# Patient Record
Sex: MALE | Race: WHITE
[De-identification: names, ages, dates, MRNs, and addresses within clinical notes are randomized per-mention and may not be internally consistent; named-entity substitution may affect disease eponyms.]

---

## 2017-03-16 ENCOUNTER — HOSPITAL ENCOUNTER (INPATIENT)
Dept: HOSPITAL 61 - ER | Age: 57
LOS: 4 days | Discharge: HOME | DRG: 189 | End: 2017-03-20
Attending: FAMILY MEDICINE | Admitting: FAMILY MEDICINE
Payer: MEDICARE

## 2017-03-16 VITALS — DIASTOLIC BLOOD PRESSURE: 66 MMHG | SYSTOLIC BLOOD PRESSURE: 125 MMHG

## 2017-03-16 VITALS — DIASTOLIC BLOOD PRESSURE: 78 MMHG | SYSTOLIC BLOOD PRESSURE: 136 MMHG

## 2017-03-16 VITALS — HEIGHT: 67 IN | BODY MASS INDEX: 24.8 KG/M2 | WEIGHT: 158 LBS

## 2017-03-16 VITALS — DIASTOLIC BLOOD PRESSURE: 69 MMHG | SYSTOLIC BLOOD PRESSURE: 122 MMHG

## 2017-03-16 DIAGNOSIS — Z99.81: ICD-10-CM

## 2017-03-16 DIAGNOSIS — J44.0: ICD-10-CM

## 2017-03-16 DIAGNOSIS — J98.11: ICD-10-CM

## 2017-03-16 DIAGNOSIS — J96.21: Primary | ICD-10-CM

## 2017-03-16 DIAGNOSIS — F17.210: ICD-10-CM

## 2017-03-16 DIAGNOSIS — Z79.82: ICD-10-CM

## 2017-03-16 DIAGNOSIS — G40.909: ICD-10-CM

## 2017-03-16 DIAGNOSIS — J20.9: ICD-10-CM

## 2017-03-16 DIAGNOSIS — Z88.8: ICD-10-CM

## 2017-03-16 DIAGNOSIS — F41.9: ICD-10-CM

## 2017-03-16 DIAGNOSIS — G89.29: ICD-10-CM

## 2017-03-16 DIAGNOSIS — E11.9: ICD-10-CM

## 2017-03-16 DIAGNOSIS — K21.9: ICD-10-CM

## 2017-03-16 DIAGNOSIS — J44.1: ICD-10-CM

## 2017-03-16 DIAGNOSIS — Z87.01: ICD-10-CM

## 2017-03-16 DIAGNOSIS — Z79.899: ICD-10-CM

## 2017-03-16 LAB
ALBUMIN SERPL-MCNC: 3.2 G/DL (ref 3.4–5)
ALBUMIN/GLOB SERPL: 0.9 {RATIO} (ref 1–1.7)
ALP SERPL-CCNC: 113 U/L (ref 46–116)
ALT SERPL-CCNC: 16 U/L (ref 16–63)
ANION GAP SERPL CALC-SCNC: 1 MMOL/L (ref 6–14)
AST SERPL-CCNC: 13 U/L (ref 15–37)
BASOPHILS # BLD AUTO: 0 X10^3/UL (ref 0–0.2)
BASOPHILS NFR BLD: 0 % (ref 0–3)
BILIRUB SERPL-MCNC: 0.3 MG/DL (ref 0.2–1)
BUN SERPL-MCNC: 14 MG/DL (ref 8–26)
BUN/CREAT SERPL: 13 (ref 6–20)
CALCIUM SERPL-MCNC: 8.8 MG/DL (ref 8.5–10.1)
CHLORIDE SERPL-SCNC: 97 MMOL/L (ref 98–107)
CK SERPL-CCNC: 97 U/L (ref 39–308)
CKMB INDEX: 2.5 % (ref 0–4)
CKMB MASS: 2.4 NG/ML (ref 0–3.6)
CO2 SERPL-SCNC: > 45 MMOL/L (ref 21–32)
CREAT SERPL-MCNC: 1.1 MG/DL (ref 0.7–1.3)
EOSINOPHIL NFR BLD: 0 % (ref 0–3)
ERYTHROCYTE [DISTWIDTH] IN BLOOD BY AUTOMATED COUNT: 14.7 % (ref 11.5–14.5)
GFR SERPLBLD BASED ON 1.73 SQ M-ARVRAT: 68.8 ML/MIN
GLOBULIN SER-MCNC: 3.7 G/DL (ref 2.2–3.8)
GLUCOSE SERPL-MCNC: 204 MG/DL (ref 70–99)
HCO3 BLDA-SCNC: 41 MMOL/L (ref 21–28)
HCT VFR BLD CALC: 39.8 % (ref 39–53)
HGB BLD-MCNC: 13.1 G/DL (ref 13–17.5)
INSPIRATION SETTING TIME VENT: (no result)
LYMPHOCYTES # BLD: 1.1 X10^3/UL (ref 1–4.8)
LYMPHOCYTES NFR BLD AUTO: 17 % (ref 24–48)
MCH RBC QN AUTO: 30 PG (ref 25–35)
MCHC RBC AUTO-ENTMCNC: 33 G/DL (ref 31–37)
MCV RBC AUTO: 92 FL (ref 79–100)
MONOCYTES NFR BLD: 6 % (ref 0–9)
NEUTROPHILS NFR BLD AUTO: 76 % (ref 31–73)
OBC FLU: (no result)
PCO2 BLDA: 68 MMHG (ref 35–46)
PH ABG: 7.4 (ref 7.35–7.45)
PLATELET # BLD AUTO: 157 X10^3/UL (ref 140–400)
PO2 BLDA: 56 MMHG (ref 75–108)
POTASSIUM SERPL-SCNC: 4.2 MMOL/L (ref 3.5–5.1)
PROT SERPL-MCNC: 6.9 G/DL (ref 6.4–8.2)
RBC # BLD AUTO: 4.32 X10^6/UL (ref 4.3–5.7)
SAO2 % BLDA: 88 % (ref 92–99)
SODIUM SERPL-SCNC: 143 MMOL/L (ref 136–145)
WBC # BLD AUTO: 6.2 X10^3/UL (ref 4–11)

## 2017-03-16 PROCEDURE — 71010: CPT

## 2017-03-16 PROCEDURE — 84484 ASSAY OF TROPONIN QUANT: CPT

## 2017-03-16 PROCEDURE — 36600 WITHDRAWAL OF ARTERIAL BLOOD: CPT

## 2017-03-16 PROCEDURE — 94660 CPAP INITIATION&MGMT: CPT

## 2017-03-16 PROCEDURE — 83880 ASSAY OF NATRIURETIC PEPTIDE: CPT

## 2017-03-16 PROCEDURE — 82553 CREATINE MB FRACTION: CPT

## 2017-03-16 PROCEDURE — 83605 ASSAY OF LACTIC ACID: CPT

## 2017-03-16 PROCEDURE — 93005 ELECTROCARDIOGRAM TRACING: CPT

## 2017-03-16 PROCEDURE — 36415 COLL VENOUS BLD VENIPUNCTURE: CPT

## 2017-03-16 PROCEDURE — 87040 BLOOD CULTURE FOR BACTERIA: CPT

## 2017-03-16 PROCEDURE — 85027 COMPLETE CBC AUTOMATED: CPT

## 2017-03-16 PROCEDURE — 87804 INFLUENZA ASSAY W/OPTIC: CPT

## 2017-03-16 PROCEDURE — 94640 AIRWAY INHALATION TREATMENT: CPT

## 2017-03-16 PROCEDURE — 87641 MR-STAPH DNA AMP PROBE: CPT

## 2017-03-16 PROCEDURE — 94760 N-INVAS EAR/PLS OXIMETRY 1: CPT

## 2017-03-16 PROCEDURE — 80053 COMPREHEN METABOLIC PANEL: CPT

## 2017-03-16 PROCEDURE — 82947 ASSAY GLUCOSE BLOOD QUANT: CPT

## 2017-03-16 PROCEDURE — 82805 BLOOD GASES W/O2 SATURATION: CPT

## 2017-03-16 RX ADMIN — ALBUTEROL SULFATE PRN MG: 108 AEROSOL, METERED RESPIRATORY (INHALATION) at 20:50

## 2017-03-16 RX ADMIN — AMITRIPTYLINE HYDROCHLORIDE SCH MG: 25 TABLET, FILM COATED ORAL at 21:11

## 2017-03-16 RX ADMIN — BACLOFEN SCH MG: 10 TABLET ORAL at 21:11

## 2017-03-16 RX ADMIN — LEVETIRACETAM SCH MG: 500 TABLET, FILM COATED ORAL at 21:11

## 2017-03-16 RX ADMIN — CLONAZEPAM SCH MG: 0.5 TABLET ORAL at 21:11

## 2017-03-16 NOTE — PHYS DOC
Past Medical History


Past Medical History:  Anxiety, COPD, Diabetes-Type II, GERD, Pneumonia, Seizure

, Other


Additional Past Medical Histor:  Bronchitis, RSD, back pain


Past Surgical History:  Other


Additional Past Surgical Histo:  PAIN PUMP, CYSTS REMOVED


Alcohol Use:  Rarely


Drug Use:  None





Adult General


Chief Complaint


Chief Complaint:  SHORTNESS OF BREATH





HPI


HPI


Patient is a 56  year old male who presents with complaint of shortness breath 

and cough. Patient has had worsening symptoms over the past week. Patient has 

history of COPD area patient also has history of chronic pain and currently has 

a morphine pain pump in place. Patient has not had any recent adjustments in 

2016 to his pump. Patient states that he has been having productive 

cough of yellowish clear sputum. A shows a Dr. Ahumada for primary care. 

Patient denies any chest pain or pain aside from his chronic back pain. Patient 

has taken albuterol at home with no relief in symptoms.





Review of Systems


Review of Systems





Constitutional: Denies fever or chills []


Eyes: Denies change in visual acuity, redness, or eye pain []


HENT: Denies nasal congestion or sore throat []


Respiratory: Productive cough, shortness of breath []


Cardiovascular: Denies chest pain or edema []


GI: Denies abdominal pain, nausea, vomiting, bloody stools or diarrhea []


: Denies dysuria or hematuria []


Musculoskeletal: Denies back pain or joint pain []


Integument: Denies rash or skin lesions []


Neurologic: Confusion, denies headache, focal weakness or sensory changes []





Current Medications


Current Medications





 Current Medications








 Medications


  (Trade)  Dose


 Ordered  Sig/Garrick  Start Time


 Stop Time Status Last Admin


Dose Admin


 


 Albuterol/


 Ipratropium


  (Duoneb)  3 ml  1X  ONCE  3/16/17 12:45


 3/16/17 12:46 DC 3/16/17 13:09


3 ML


 


 Sodium Chloride


  (Iv Sodium


 Chloride 0.9%


 1000ml Bag)  1,000 ml @ 


 100 mls/hr  Q10H  3/16/17 12:38


 3/16/17 22:37  3/16/17 13:08


100 MLS/HR











Allergies


Allergies





 Allergies








Coded Allergies Type Severity Reaction Last Updated Verified


 


  midazolam Allergy Severe Anaphylaxis 3/16/17 Yes


 


  paroxetine Allergy Severe seizures 3/16/17 Yes


 


  I S O L A T I O N *CONTACT* Allergy Unknown  3/16/17 Yes











Physical Exam


Physical Exam





Constitutional: Alert, afebrile, confused, no acute distress. []


HENT: Normocephalic, atraumatic, bilateral external ears normal, oropharynx 

moist, no oral exudates, nose normal. []


Eyes: PERRLA, EOMI, conjunctiva normal, no discharge. [] 


Neck: Normal range of motion, no tenderness, supple, no stridor. [] 


Cardiovascular:Heart rate regular rhythm, no murmur []


Lungs & Thorax: Moderately restricted air movement bilaterally, expiratory 

wheezes bilaterally, no rales []


Abdomen: Bowel sounds normal, soft, no tenderness, no masses, no pulsatile 

masses. [] 


Skin: Warm, dry, no erythema, no rash. [] 


Back: No tenderness, no CVA tenderness. [] 


Extremities: No tenderness, no cyanosis, no clubbing, ROM intact, no edema. [] 


Neurologic: Alert, oriented to person place and time, disoriented to events, 

normal motor function, normal sensory function, no focal deficits noted. []





Current Patient Data


Vital Signs





 Vital Signs








  Date Time  Temp Pulse Resp B/P Pulse Ox O2 Delivery O2 Flow Rate FiO2


 


3/16/17 13:09     90 Nasal Cannula 5.0 


 


3/16/17 11:30 98.5 95 22 131/74    





 98.5       








Lab Values





 Laboratory Tests








Test


  3/16/17


12:35 3/16/17


12:38 3/16/17


13:05


 


White Blood Count


  6.2x10^3/uL


(4.0-11.0) 


  


 


 


Red Blood Count


  4.32x10^6/uL


(4.30-5.70) 


  


 


 


Hemoglobin


  13.1g/dL


(13.0-17.5) 


  


 


 


Hematocrit


  39.8%


(39.0-53.0) 


  


 


 


Mean Corpuscular Volume 92fL ()    


 


Mean Corpuscular Hemoglobin 30pg (25-35)    


 


Mean Corpuscular Hemoglobin


Concent 33g/dL (31-37)


  


  


 


 


Red Cell Distribution Width


  14.7%


(11.5-14.5)  H 


  


 


 


Platelet Count


  157x10^3/uL


(140-400) 


  


 


 


Neutrophils (%) (Auto) 76% (31-73)  H  


 


Lymphocytes (%) (Auto) 17% (24-48)  L  


 


Monocytes (%) (Auto) 6% (0-9)    


 


Eosinophils (%) (Auto) 0% (0-3)    


 


Basophils (%) (Auto) 0% (0-3)    


 


Neutrophils # (Auto)


  4.8x10^3uL


(1.8-7.7) 


  


 


 


Lymphocytes # (Auto)


  1.1x10^3/uL


(1.0-4.8) 


  


 


 


Monocytes # (Auto)


  0.4x10^3/uL


(0.0-1.1) 


  


 


 


Eosinophils # (Auto)


  0.0x10^3/uL


(0.0-0.7) 


  


 


 


Basophils # (Auto)


  0.0x10^3/uL


(0.0-0.2) 


  


 


 


Sodium Level


  143mmol/L


(136-145) 


  


 


 


Potassium Level


  4.2mmol/L


(3.5-5.1) 


  


 


 


Chloride Level


  97mmol/L


()  L 


  


 


 


Carbon Dioxide Level


  > 45mmol/L


(21-32)  H 


  


 


 


Anion Gap 1 (6-14)  L  


 


Blood Urea Nitrogen


  14mg/dL (8-26)


  


  


 


 


Creatinine


  1.1mg/dL


(0.7-1.3) 


  


 


 


Estimated GFR


(Cockcroft-Gault) 68.8  


  


  


 


 


BUN/Creatinine Ratio 13 (6-20)    


 


Glucose Level


  204mg/dL


(70-99)  H 


  


 


 


Lactic Acid Level


  1.4mmol/L


(0.4-2.0) 


  


 


 


Calcium Level


  8.8mg/dL


(8.5-10.1) 


  


 


 


Total Bilirubin


  0.3mg/dL


(0.2-1.0) 


  


 


 


Aspartate Amino Transferase


(AST) 13U/L (15-37)


L 


  


 


 


Alanine Aminotransferase (ALT) 16U/L (16-63)    


 


Alkaline Phosphatase


  113U/L


() 


  


 


 


Creatine Kinase


  97U/L ()


  


  


 


 


Creatine Kinase MB (Mass)


  2.4ng/mL


(0.0-3.6) 


  


 


 


Creatine Kinase MB Relative


Index 2.5% (0-4)  


  


  


 


 


Troponin I Quantitative


  0.018ng/mL


(0.000-0.055) 


  


 


 


NT-Pro-B-Type Natriuretic


Peptide 463pg/mL


(0-124)  H 


  


 


 


Total Protein


  6.9g/dL


(6.4-8.2) 


  


 


 


Albumin


  3.2g/dL


(3.4-5.0)  L 


  


 


 


Albumin/Globulin Ratio


  0.9 (1.0-1.7)


L 


  


 


 


O2 Saturation  88% (92-99)  L 


 


Arterial Blood pH


  


  7.40


(7.35-7.45) 


 


 


Arterial Blood pCO2 at


Patient Temp 


  68mmHg (35-46)


*H 


 


 


Arterial Blood pO2 at Patient


Temp 


  56mmHg


()  L 


 


 


Arterial Blood HCO3


  


  41mmol/L


(21-28)  H 


 


 


Arterial Blood Base Excess


  


  13mmol/L


(-3-3)  H 


 


 


FiO2  40%   


 


Influenza Type A Antigen


  


  


  Negative


(NEGATIVE)


 


Influenza Type B Antigen


  


  


  Negative


(NEGATIVE)





 Laboratory Tests


3/16/17 12:35








 Laboratory Tests


3/16/17 12:35














EKG


EKG


Interpreted by me: Heart rate 91, sinus tachycardia, normal intervals, normal 

axis, no acute ST/T-wave abnormalities present []





Radiology/Procedures


Radiology/Procedures





 [facility]


 [address] [place] [address]


 [phone]


 


 IMAGING REPORT





 Signed





PATIENT: ZACHARY LENZ ACCOUNT: [id number] MRN#: [id number]


: 1960 LOCATION: ER AGE: 56


SEX: M EXAM DT: 17 ACCESSION#: [id number]


STATUS: REG ER ORD. PHYSICIAN: ELOISA LUNDBERG MD 


REASON: shortness of breath, cough


PROCEDURE: PORTABLE CHEST 1V











Portable chest, 3/16/2017:





History: Cough, shortness of breath





Comparison is made to a study from 1/15/2017. The heart size is normal. There


are emphysematous changes in the lungs with scattered parenchymal scars. There


is mild streaky bibasilar atelectasis and/or scarring. There is no evidence of


pleural fluid.





IMPRESSION:


1. Emphysema.


2. Mild bibasilar streaky atelectasis and/or scarring. A component of


pneumonia cannot be excluded.














DICTATED and SIGNED BY:     MORITZ,RICK S MD


DATE:     17 6739





CC: ELOISA LUNDBERG MD; MONIKA AHUMADA MD ~


[]





Course & Med Decision Making


Course & Med Decision Making


Pertinent Labs and Imaging studies reviewed. (See chart for details)





Patient was found to be significantly hypoxic. Despite increase in supplemental 

oxygen per nasal cannula, the patient's O2 sats were 88%. The patient was 

switched to a Venturi mask at 40% with improvement of oxygen saturation at 94%. 

The patient's x-ray findings are most consistent with atelectasis as the 

patient does not have a fever, high white count, or other evidence of active 

pneumonia at this time. The patient will be admitted for further treatment. I 

spoke with Dr. Ahumada who accepted care patient in hospital.





Dragon Disclaimer


Dragon Disclaimer


This electronic medical record was generated, in whole or in part, using a 

voice recognition dictation system.





Departure


Departure


Impression:  


 Primary Impression:  


 Acute on chronic respiratory failure


 Additional Impression:  


 Altered mental status


Disposition:   ADMITTED AS INPATIENT


Admitting Physician:  Monika Ahumada


Condition:  GUARDED


Referrals:  


MONIKA AHUMADA MD (PCP)





Problem Qualifiers








 Primary Impression:  


 Acute on chronic respiratory failure


 Respiratory failure complication:  hypoxia  Qualified Code:  J96.21 - Acute 

and chronic respiratory failure with hypoxia


 Additional Impression:  


 Altered mental status


 Altered mental status type:  disorientation  Qualified Code:  R41.0 - 

Disorientation, unspecified





ELOISA LUNDBERG MD Mar 16, 2017 14:29

## 2017-03-16 NOTE — EKG
[facility]

               [address] [place] [address]

Test Date:    2017               Test Time:    13:31:51

Pat Name:     ZACHARY LENZ          Department:   

Patient ID:   [id number]           Room:          

Gender:       Male                     Technician:   

:          1960               Requested By: ELOISA LUNDBERG

Order Number: [id number]            Reading MD:   Mandie Logan

                                 Measurements

Intervals                              Axis          

Rate:         91                       P:            -8

AK:           144                      QRS:          60

QRSD:         72                       T:            37

QT:           354                                    

QTc:          443                                    

                           Interpretive Statements

SINUS RHYTHM

NORMAL ECG

RI6.01          Unconfirmed report

Compared to ECG 01/15/2017 12:59:19

Sinus tachycardia no longer present



Electronically Signed On 3- 20:03:51 CDT by Mandie Logan

## 2017-03-16 NOTE — ACF
Admission Forms Criteria


                                 RESPIRATORY FAILURE [facility]





Clinical Indications for Admission to Inpatient Care





                                                                    (Place 'X' 

for any and all applicable criteria):


                                                                          


Hospital admission is needed for appropriate care of the patient because of 

acute respiratory failure or insufficiency 


as indicated by ANY ONE of the following(1)(2)(3)(4)(5)(6)(7)(8):





[ ]I.    Mechanical ventilation needed (acute invasive or noninvasive)


[X]II.   Severe ventilation deficit as indicated by ANY ONE of the following (9)


        [ ]a)  Respiratory acidosis (pH less than 7.32 and partial pressure of 

carbon dioxide greater 


                than 40 mm Hg (5.3 kPa))


        [X]b)  Partial pressure of carbon dioxide greater than 44 mm Hg (5.9 kPa

) (new)


        [ ]c)  Airflow measurements less than 25% of predicted (eg, peak 

expiratory flow rate 


                less than 100 L/minute)


        [ ]d)  Forced vital capacity less than 15 mL/kg of ideal body weight, 

or 50% decrease in 


                vital capacity from baseline


[ ]III.  Noncardiac pulmonary edema not resolving with rapid emergency 

treatment (8)


[ ]IV. Severe respiratory distress as indicated by ANY ONE of the following:


        [ ]a)  Severe tachypnea (respiratory rate greater than 30, greater than 

45 for 6-month-old, 


                greater than 60 for )


        [ ]b)  Severe hypoxemia (partial pressure of oxygen less than 50 mm Hg (

6.7 kPa) on greater


                than 50% oxygen or partial pressure of oxygen to FIO2 ratio 

less than 200)


        [ ]c)  Mental status deterioration from respiratory disease


[ ]V.  Airway obstruction or inadequate protection [A](10)(11) 











The original [employer] content created by [employer] has been revised. 


The portions of the content which have been revised are identified through the 

use of italic text or in bold, and [employer] 


has neither  reviewed nor approved the modified material. All other unmodified 

content is copyright  [employer].





Please see references footnoted in the original [employer] edition 

2016


Admission Criteria Met?:  Yes








CRIS SALAS Mar 16, 2017 14:48

## 2017-03-17 VITALS — DIASTOLIC BLOOD PRESSURE: 55 MMHG | SYSTOLIC BLOOD PRESSURE: 106 MMHG

## 2017-03-17 VITALS — SYSTOLIC BLOOD PRESSURE: 125 MMHG | DIASTOLIC BLOOD PRESSURE: 65 MMHG

## 2017-03-17 VITALS — SYSTOLIC BLOOD PRESSURE: 126 MMHG | DIASTOLIC BLOOD PRESSURE: 69 MMHG

## 2017-03-17 VITALS — SYSTOLIC BLOOD PRESSURE: 124 MMHG | DIASTOLIC BLOOD PRESSURE: 79 MMHG

## 2017-03-17 VITALS — SYSTOLIC BLOOD PRESSURE: 119 MMHG | DIASTOLIC BLOOD PRESSURE: 79 MMHG

## 2017-03-17 VITALS — SYSTOLIC BLOOD PRESSURE: 124 MMHG | DIASTOLIC BLOOD PRESSURE: 72 MMHG

## 2017-03-17 LAB
HCO3 BLDA-SCNC: 41 MMOL/L (ref 21–28)
INSPIRATION SETTING TIME VENT: 50
PCO2 BLDA: 73 MMHG (ref 35–46)
PH ABG: 7.37 (ref 7.35–7.45)
PO2 BLDA: 107 MMHG (ref 75–108)
SAO2 % BLDA: 97 % (ref 92–99)

## 2017-03-17 RX ADMIN — BUDESONIDE SCH MG: 0.5 SUSPENSION RESPIRATORY (INHALATION) at 19:37

## 2017-03-17 RX ADMIN — BACLOFEN SCH MG: 10 TABLET ORAL at 09:06

## 2017-03-17 RX ADMIN — METHYLPREDNISOLONE SODIUM SUCCINATE SCH MG: 40 INJECTION, POWDER, FOR SOLUTION INTRAMUSCULAR; INTRAVENOUS at 21:00

## 2017-03-17 RX ADMIN — BACLOFEN SCH MG: 10 TABLET ORAL at 20:59

## 2017-03-17 RX ADMIN — IPRATROPIUM BROMIDE AND ALBUTEROL SULFATE SCH ML: .5; 3 SOLUTION RESPIRATORY (INHALATION) at 15:33

## 2017-03-17 RX ADMIN — TAMSULOSIN HYDROCHLORIDE SCH MG: 0.4 CAPSULE ORAL at 09:07

## 2017-03-17 RX ADMIN — Medication SCH MLS/HR: at 11:46

## 2017-03-17 RX ADMIN — ENOXAPARIN SODIUM SCH MG: 40 INJECTION SUBCUTANEOUS at 11:45

## 2017-03-17 RX ADMIN — IPRATROPIUM BROMIDE AND ALBUTEROL SULFATE SCH ML: .5; 3 SOLUTION RESPIRATORY (INHALATION) at 07:15

## 2017-03-17 RX ADMIN — FUROSEMIDE SCH MG: 20 TABLET ORAL at 16:21

## 2017-03-17 RX ADMIN — METHYLPREDNISOLONE SODIUM SUCCINATE SCH MG: 40 INJECTION, POWDER, FOR SOLUTION INTRAMUSCULAR; INTRAVENOUS at 09:07

## 2017-03-17 RX ADMIN — IPRATROPIUM BROMIDE AND ALBUTEROL SULFATE SCH ML: .5; 3 SOLUTION RESPIRATORY (INHALATION) at 11:08

## 2017-03-17 RX ADMIN — AMITRIPTYLINE HYDROCHLORIDE SCH MG: 25 TABLET, FILM COATED ORAL at 20:59

## 2017-03-17 RX ADMIN — ASPIRIN 81 MG CHEWABLE TABLET SCH MG: 81 TABLET CHEWABLE at 09:06

## 2017-03-17 RX ADMIN — FUROSEMIDE SCH MG: 20 TABLET ORAL at 09:06

## 2017-03-17 RX ADMIN — BUDESONIDE SCH MG: 0.5 SUSPENSION RESPIRATORY (INHALATION) at 11:00

## 2017-03-17 RX ADMIN — CLONAZEPAM SCH MG: 0.5 TABLET ORAL at 09:06

## 2017-03-17 RX ADMIN — CLONAZEPAM SCH MG: 0.5 TABLET ORAL at 20:59

## 2017-03-17 RX ADMIN — AMLODIPINE BESYLATE SCH MG: 2.5 TABLET ORAL at 09:06

## 2017-03-17 RX ADMIN — IPRATROPIUM BROMIDE AND ALBUTEROL SULFATE SCH ML: .5; 3 SOLUTION RESPIRATORY (INHALATION) at 19:37

## 2017-03-17 RX ADMIN — LEVETIRACETAM SCH MG: 500 TABLET, FILM COATED ORAL at 09:07

## 2017-03-17 RX ADMIN — PANTOPRAZOLE SODIUM SCH MG: 40 TABLET, DELAYED RELEASE ORAL at 09:07

## 2017-03-17 RX ADMIN — LEVETIRACETAM SCH MG: 500 TABLET, FILM COATED ORAL at 20:59

## 2017-03-17 NOTE — HP
ADMIT DATE:  03/16/2017



CHIEF COMPLAINT:  Shortness of breath.



HISTORY OF PRESENT ILLNESS:  A 56-year-old known end-stage, COPD patient on

chronic oxygen, also takes medication for chronic pain and ____ implant under

direction of Camilo Alejandro M.D.  He came in with increasing shortness of breath

and found to be in some degree of respiratory failure with high pCO2 ____ be

normal pH.  He was placed on BiPAP overnight and is done better.  He is eating

and drinking this morning and feeling better.  He describes no fever, chills,

significant sputum or hemoptysis.  Chest x-ray was clear.



PAST MEDICAL HISTORY:  Well documented in the old records.



MEDICATIONS:  Multiple meds listed per the chart.



Last admission was about 6-8 weeks ago.



SOCIAL HISTORY:  Still smokes fairly heavily at home.  , disabled,

nondrinker to my knowledge.



FAMILY HISTORY:  Unremarkable.



REVIEW OF SYSTEMS:  No other complaints.



OBJECTIVE:

ENT:  All within normal limits.

NECK:  No masses, nodes or bruits.

LUNGS:  Decreased breath sounds, coarse expiratory wheezes.  No tachypnea noted.

CARDIOVASCULAR:  Regular rate.  No tachycardia or murmur.

ABDOMEN:  Soft, benign, nontender.

EXTREMITIES:  Good pedal and radial pulses.  No joint or skin lesions, 2+

clubbing.

NEUROLOGIC:  Physiologic, moves all extremities, alert, oriented x 4.



ASSESSMENT:  Exacerbation of chronic obstructive pulmonary disease, chronic

tobacco abuse and chronic nonmalignant pain.



PLAN:  He is a DNR, continue IV steroids, sputum culture, supportive care.

 



______________________________

MONIKA AHUMADA MD



DR:  SANJUANITA/duane  JOB#:  [id number] / [id number]

DD:  03/17/2017 08:33  DT:  03/17/2017 09:08

## 2017-03-17 NOTE — RAD
Chest AP only 

Indication: Altered mental status and shortness of air. 

Time of exam 1:22 45. 

Comparison is made with prior chest from 03/16/2017. 

The heart is enlarged but stable. Lungs appear hyperinflated consistent 

with COPD. No parenchymal consolidation is identified. No effusion or 

pneumothorax is seen. 

Impression: COPD. No acute feature is identified. 

 

Electronically signed by: Estiven Landaverde MD (Mar 17, 2017 01:35:17)

## 2017-03-17 NOTE — CONS
DATE OF CONSULTATION:  03/17/2017



I was asked to see this 56-year-old gentleman for acute-on-chronic respiratory

failure, acute exacerbation of COPD.



HISTORY OF PRESENT ILLNESS:  He does have history of 80-pack-year smoking,

continues to smoke about 1 pack per day.  He is on oxygen 5 liters per minute

via nasal cannula continuously.  He is apparently on BiPAP at night.  He has had

increased shortness of breath, cough, yellow sputum production, wheezing for the

past few days.  He has nasal congestion, but denies chest pain or

gastroesophageal reflux symptoms.



PAST MEDICAL HISTORY:  COPD, chronic respiratory failure, diabetes mellitus,

gastroesophageal reflux disease, seizure.



ALLERGIES:  MIDAZOLAM, PAROXETINE.



MEDICATIONS:  Currently, he is on albuterol and Atrovent nebulizer, Solu-Medrol

40 mg IV every 12.



SOCIAL HISTORY:  History of 80-pack-year smoking, continues to smoke about 1

pack per day.



FAMILY HISTORY:  There is no history of lung disease.



REVIEW OF SYSTEMS:  As mentioned as above, other systems otherwise negative.



PHYSICAL EXAMINATION:

GENERAL:  This is a gentleman who looks older than his age.

VITAL SIGNS:  His O2 saturation is 93%, heart rate 86, blood pressure 127/79,

temperature 97.6, respiratory rate 20.

HEENT:  Normocephalic, atraumatic.  Pupils equal, round, reactive to light. 

Throat is clear.  Nose, there is an inflamed mucosa.

NECK:  There is no JVD, lymphadenopathy or thyromegaly.

CARDIOVASCULAR:  Regular rate and rhythm.  PMI is nondisplaced.

CHEST:  Inspection is normal.

LUNGS:  There is end expiratory wheezing, dullness at the bases.

ABDOMEN:  Soft.  Bowel sounds are good.  There is no mass.

EXTREMITIES:  There is no cyanosis.

LYMPHATICS:  There is no lymphadenopathy.

SKIN:  Chronic changes.

NEUROLOGIC:  Alert and oriented x 3.

LYMPHATICS:  There is no lymphadenopathy.



LABORATORY DATA:  I reviewed the following lab data:  Chest x-ray shows COPD

changes.  WBC 6.2, hemoglobin 13.1, platelets 157.  Influenza A and B negative. 

ABG:  pH 7.37, pCO2 of 73, pO2 107 on 50% Ventimask.  Sodium 142, potassium 4.2,

chloride 97, CO2 45.  Glucose 204, BUN 14, creatinine 1.9.  Troponin 0.018.



IMPRESSION:

1.  Acute on chronic respiratory failure, multifactorial in etiology.

2.  Acute exacerbation of chronic obstructive pulmonary disease.

3.  Acute bronchitis.

4.  Tobacco habituation.

5.  History of seizure disorder.

6.  Gastroesophageal reflux disease.



PLAN AND RECOMMENDATION:

1.  Titrate FiO2 to keep O2 saturation 92%.

2.  BiPAP p.r.n. during day and continuously at night.

3.  Bronchodilator.

4.  Add inhaled corticosteroid.

5.  Solu-Medrol 40 mg IV every 12.

6.  Add Rocephin.

7.  Protonix for stress ulcer prophylaxis.

8.  Lovenox for DVT prophylaxis.

9.  Monitor respiratory status very closely.

10.  I had a long discussion with him regarding smoking cessation.  I have

advised him to stop smoking forever.



Thank you very much for allowing me to participate in care of this very nice

gentleman.  I have discussed the findings and recommendations with the patient

and RN.  I have answered all of the patient's questions.  He understood and

agreed to proceed with the plan.

 



______________________________

TAVIA KINGSTON M.D.



:  RONNI/duane  JOB#:  [id number] / [id number]

DD:  03/17/2017 10:25  DT:  03/17/2017 10:43

FERMIN

## 2017-03-17 NOTE — PDOC
Provider Note


Provider Note


[id number]





acute on chronic resp fail


ae of copd


acute bronchitis





see orders








TAVIA KINGSTON MD Mar 17, 2017 10:26

## 2017-03-18 VITALS — DIASTOLIC BLOOD PRESSURE: 73 MMHG | SYSTOLIC BLOOD PRESSURE: 133 MMHG

## 2017-03-18 VITALS — SYSTOLIC BLOOD PRESSURE: 119 MMHG | DIASTOLIC BLOOD PRESSURE: 65 MMHG

## 2017-03-18 VITALS — DIASTOLIC BLOOD PRESSURE: 69 MMHG | SYSTOLIC BLOOD PRESSURE: 128 MMHG

## 2017-03-18 VITALS — DIASTOLIC BLOOD PRESSURE: 70 MMHG | SYSTOLIC BLOOD PRESSURE: 126 MMHG

## 2017-03-18 VITALS — SYSTOLIC BLOOD PRESSURE: 119 MMHG | DIASTOLIC BLOOD PRESSURE: 73 MMHG

## 2017-03-18 VITALS — DIASTOLIC BLOOD PRESSURE: 78 MMHG | SYSTOLIC BLOOD PRESSURE: 122 MMHG

## 2017-03-18 RX ADMIN — FUROSEMIDE SCH MG: 20 TABLET ORAL at 08:39

## 2017-03-18 RX ADMIN — IPRATROPIUM BROMIDE AND ALBUTEROL SULFATE SCH ML: .5; 3 SOLUTION RESPIRATORY (INHALATION) at 07:24

## 2017-03-18 RX ADMIN — ASPIRIN 81 MG CHEWABLE TABLET SCH MG: 81 TABLET CHEWABLE at 08:40

## 2017-03-18 RX ADMIN — FUROSEMIDE SCH MG: 20 TABLET ORAL at 15:03

## 2017-03-18 RX ADMIN — AMITRIPTYLINE HYDROCHLORIDE SCH MG: 25 TABLET, FILM COATED ORAL at 21:01

## 2017-03-18 RX ADMIN — CLONAZEPAM SCH MG: 0.5 TABLET ORAL at 08:39

## 2017-03-18 RX ADMIN — TAMSULOSIN HYDROCHLORIDE SCH MG: 0.4 CAPSULE ORAL at 08:40

## 2017-03-18 RX ADMIN — BUDESONIDE SCH MG: 0.5 SUSPENSION RESPIRATORY (INHALATION) at 20:48

## 2017-03-18 RX ADMIN — PANTOPRAZOLE SODIUM SCH MG: 40 TABLET, DELAYED RELEASE ORAL at 08:40

## 2017-03-18 RX ADMIN — IPRATROPIUM BROMIDE AND ALBUTEROL SULFATE SCH ML: .5; 3 SOLUTION RESPIRATORY (INHALATION) at 20:48

## 2017-03-18 RX ADMIN — MONTELUKAST SODIUM SCH MG: 10 TABLET, FILM COATED ORAL at 21:02

## 2017-03-18 RX ADMIN — LEVETIRACETAM SCH MG: 500 TABLET, FILM COATED ORAL at 08:39

## 2017-03-18 RX ADMIN — BACLOFEN SCH MG: 10 TABLET ORAL at 08:39

## 2017-03-18 RX ADMIN — IPRATROPIUM BROMIDE AND ALBUTEROL SULFATE SCH ML: .5; 3 SOLUTION RESPIRATORY (INHALATION) at 11:06

## 2017-03-18 RX ADMIN — BUDESONIDE SCH MG: 0.5 SUSPENSION RESPIRATORY (INHALATION) at 07:24

## 2017-03-18 RX ADMIN — LEVETIRACETAM SCH MG: 500 TABLET, FILM COATED ORAL at 21:02

## 2017-03-18 RX ADMIN — BACLOFEN SCH MG: 10 TABLET ORAL at 21:02

## 2017-03-18 RX ADMIN — CLONAZEPAM SCH MG: 0.5 TABLET ORAL at 21:01

## 2017-03-18 RX ADMIN — METHYLPREDNISOLONE SODIUM SUCCINATE SCH MG: 40 INJECTION, POWDER, FOR SOLUTION INTRAMUSCULAR; INTRAVENOUS at 08:40

## 2017-03-18 RX ADMIN — Medication SCH MLS/HR: at 12:42

## 2017-03-18 RX ADMIN — ENOXAPARIN SODIUM SCH MG: 40 INJECTION SUBCUTANEOUS at 12:43

## 2017-03-18 RX ADMIN — IPRATROPIUM BROMIDE AND ALBUTEROL SULFATE SCH ML: .5; 3 SOLUTION RESPIRATORY (INHALATION) at 15:35

## 2017-03-18 RX ADMIN — AMLODIPINE BESYLATE SCH MG: 2.5 TABLET ORAL at 08:43

## 2017-03-18 NOTE — PDOC
GENERAL


General:


vss and afebrile. O2 decreased to 5L/NC. CO2 retention on ABG but ph 

compensated.  CO2 on bmp > 45. cbc ok. cxr without acute change. MRSA + and 

influenza negative.  blood culture negative to date. complains of tremor and 

had sx vs syncopal spell at home prior to admit. pulmonary help appreciated. 

continue same.


Problems:  





VITAL SIGNS


Vital Signs:





 Vital Signs








  Date Time  Temp Pulse Resp B/P Pulse Ox O2 Delivery O2 Flow Rate FiO2


 


3/18/17 15:37     96 Nasal Cannula 5.0 


 


3/18/17 11:10 98.2 91 18 119/73    





 98.2       











I & O


I & O











 Intake and Output 


 


 3/18/17





 07:00


 


Intake Total 1260 ml


 


Output Total 2150 ml


 


Balance -890 ml


 


 


 


Intake Oral 1260 ml


 


Output Urine Total 2150 ml











ALLERGIES


Allergies:





 Allergies








Coded Allergies Type Severity Reaction Last Updated Verified


 


  midazolam Allergy Severe Anaphylaxis 3/16/17 Yes


 


  paroxetine Allergy Severe seizures 3/16/17 Yes


 


  I S O L A T I O N *CONTACT* Allergy Unknown  3/16/17 Yes











MEDS


Medications:





 Current Medications








 Medications


  (Trade)  Dose


 Ordered  Sig/Garrick  Start Time


 Stop Time Status Last Admin


Dose Admin


 


 Albuterol Sulfate


  (Ventolin Neb


 Soln)  2.5 mg  PRN Q4HRS  PRN  3/16/17 18:00


    3/16/17 20:50


2.5 MG


 


 Albuterol/


 Ipratropium


  (Duoneb)  3 ml  RTQID  3/17/17 08:00


    3/18/17 15:35


3 ML


 


 Amitriptyline HCl


  (Elavil)  75 mg  QHS  3/16/17 21:00


    3/17/17 20:59


75 MG


 


 Amlodipine


 Besylate


  (Norvasc)  2.5 mg  DAILY  3/17/17 09:00


    3/18/17 08:43


2.5 MG


 


 Aspirin


  (Children'S


 Aspirin)  81 mg  [id number]  3/17/17 08:00


    3/18/17 08:40


81 MG


 


 Baclofen


  (Lioresal)  20 mg  BID  3/16/17 21:00


    3/18/17 08:39


20 MG


 


 Budesonide


  (Pulmicort)  0.5 mg  RTBID  3/17/17 11:00


    3/18/17 07:24


0.5 MG


 


 Ceftriaxone


 Sodium/Sodium


 Chloride


  (Rocephin/Iv


 Sodium Chloride


 0.9% 50ml)  50 ml @ 


 100 mls/hr  Q24H  3/17/17 11:00


    3/18/17 12:42


100 MLS/HR


 


 Clonazepam


  (Klonopin)  0.5 mg  BID  3/16/17 21:00


    3/18/17 08:39


0.5 MG


 


 Enoxaparin Sodium


  (Lovenox 40mg


 Syringe)  40 mg  Q24H  3/17/17 11:00


    3/18/17 12:43


40 MG


 


 Furosemide


  (Lasix)  20 mg  BID92  3/17/17 09:00


    3/18/17 15:03


20 MG


 


 Levetiracetam


  (Keppra)  500 mg  BID  3/16/17 21:00


    3/18/17 08:39


500 MG


 


 Methylprednisolone


 Sodium Succinate


 40 mg  40 mg  Q12HR  3/17/17 09:00


 3/18/17 09:58 DC 3/18/17 08:40


40 MG


 


 Montelukast Sodium


  (Singulair)  10 mg  QHS  3/18/17 21:00


     


 


 


 Pantoprazole


 Sodium


  (Protonix)  40 mg  DAILYAC  3/17/17 11:30


 3/17/17 15:53 DC 3/17/17 11:45


40 MG


 


 Polyethylene


 Glycol


  (miraLAX PACKET)  17 gm  PRN DAILY  PRN  3/16/17 18:00


     


 


 


 Prednisone


  (Prednisone)  40 mg  DAILY  3/19/17 09:00


     


 


 


 Sodium Chloride


  (Iv Sodium


 Chloride 0.9%


 1000ml Bag)  1,000 ml @ 


 100 mls/hr  Q10H  3/16/17 12:38


 3/16/17 22:37 DC 3/16/17 13:08


100 MLS/HR


 


 Tamsulosin HCl


  (Flomax)  0.4 mg  DAILY  3/17/17 09:00


    3/18/17 08:40


0.4 MG














DAWSON SY MD Mar 18, 2017 15:53

## 2017-03-18 NOTE — PDOC
PULMONARY PROGRESS NOTES


Subjective


on 02, used bipap last night, sob, cough better, no pain, has runny nose


Vitals





 Vital Signs








  Date Time  Temp Pulse Resp B/P Pulse Ox O2 Delivery O2 Flow Rate FiO2


 


3/18/17 08:43  80  126/70    


 


3/18/17 07:25     98 Nasal Cannula 5.0 


 


3/18/17 07:10 97.9  18     





 97.9       








Comments


ros as mentioned as above other sys otherwise neg


ROS:  No Nausea, No Chest Pain, No Abdominal Pain


General:  Alert, Lethargic


HEENT:  Other (nc at perrl throat clear)


Lungs:  Wheezing, Other


Cardiovascular:  S1


Abdomen:  Soft, Non-tender


Neuro Exam:  Alert


Extremities:  Other


Skin:  Warm


Labs





Laboratory Tests








Test


  3/16/17


12:35 3/16/17


12:38 3/16/17


13:05 3/16/17


18:28


 


White Blood Count


  6.2x10^3/uL


(4.0-11.0) 


  


  


 


 


Red Blood Count


  4.32x10^6/uL


(4.30-5.70) 


  


  


 


 


Hemoglobin


  13.1g/dL


(13.0-17.5) 


  


  


 


 


Hematocrit


  39.8%


(39.0-53.0) 


  


  


 


 


Mean Corpuscular Volume 92fL ()    


 


Mean Corpuscular Hemoglobin 30pg (25-35)    


 


Mean Corpuscular Hemoglobin


Concent 33g/dL (31-37) 


  


  


  


 


 


Red Cell Distribution Width


  14.7%


(11.5-14.5) 


  


  


 


 


Platelet Count


  157x10^3/uL


(140-400) 


  


  


 


 


Neutrophils (%) (Auto) 76% (31-73)    


 


Lymphocytes (%) (Auto) 17% (24-48)    


 


Monocytes (%) (Auto) 6% (0-9)    


 


Eosinophils (%) (Auto) 0% (0-3)    


 


Basophils (%) (Auto) 0% (0-3)    


 


Neutrophils # (Auto)


  4.8x10^3uL


(1.8-7.7) 


  


  


 


 


Lymphocytes # (Auto)


  1.1x10^3/uL


(1.0-4.8) 


  


  


 


 


Monocytes # (Auto)


  0.4x10^3/uL


(0.0-1.1) 


  


  


 


 


Eosinophils # (Auto)


  0.0x10^3/uL


(0.0-0.7) 


  


  


 


 


Basophils # (Auto)


  0.0x10^3/uL


(0.0-0.2) 


  


  


 


 


Sodium Level


  143mmol/L


(136-145) 


  


  


 


 


Potassium Level


  4.2mmol/L


(3.5-5.1) 


  


  


 


 


Chloride Level


  97mmol/L


() 


  


  


 


 


Carbon Dioxide Level


  > 45mmol/L


(21-32) 


  


  


 


 


Anion Gap 1 (6-14)    


 


Blood Urea Nitrogen 14mg/dL (8-26)    


 


Creatinine


  1.1mg/dL


(0.7-1.3) 


  


  


 


 


Estimated GFR


(Cockcroft-Gault) 68.8 


  


  


  


 


 


BUN/Creatinine Ratio 13 (6-20)    


 


Glucose Level


  204mg/dL


(70-99) 


  


  


 


 


Lactic Acid Level


  1.4mmol/L


(0.4-2.0) 


  


  


 


 


Calcium Level


  8.8mg/dL


(8.5-10.1) 


  


  


 


 


Total Bilirubin


  0.3mg/dL


(0.2-1.0) 


  


  


 


 


Aspartate Amino Transf


(AST/SGOT) 13U/L (15-37) 


  


  


  


 


 


Alanine Aminotransferase


(ALT/SGPT) 16U/L (16-63) 


  


  


  


 


 


Alkaline Phosphatase


  113U/L


() 


  


  


 


 


Creatine Kinase 97U/L ()    


 


Creatine Kinase MB (Mass)


  2.4ng/mL


(0.0-3.6) 


  


  


 


 


Creatine Kinase MB Relative


Index 2.5% (0-4) 


  


  


  


 


 


Troponin I Quantitative


  0.018ng/mL


(0.000-0.055) 


  


  


 


 


NT-Pro-B-Type Natriuretic


Peptide 463pg/mL


(0-124) 


  


  


 


 


Total Protein


  6.9g/dL


(6.4-8.2) 


  


  


 


 


Albumin


  3.2g/dL


(3.4-5.0) 


  


  


 


 


Albumin/Globulin Ratio 0.9 (1.0-1.7)    


 


O2 Saturation  88% (92-99)   


 


Arterial Blood pH


  


  7.40


(7.35-7.45) 


  


 


 


Arterial Blood pCO2 at


Patient Temp 


  68mmHg (35-46) 


  


  


 


 


Arterial Blood pO2 at Patient


Temp 


  56mmHg


() 


  


 


 


Arterial Blood HCO3


  


  41mmol/L


(21-28) 


  


 


 


Arterial Blood Base Excess


  


  13mmol/L


(-3-3) 


  


 


 


FiO2  40%   


 


Influenza Type A Antigen


  


  


  Negative


(NEGATIVE) 


 


 


Influenza Type B Antigen


  


  


  Negative


(NEGATIVE) 


 


 


Nasal Screen MRSA (PCR)


  


  


  


  Positive


(Negative)














Test


  3/16/17


23:59 3/17/17


00:33 


  


 


 


Glucose (Fingerstick)


  148mg/dL


(70-99) 


  


  


 


 


O2 Saturation  97% (92-99)   


 


Arterial Blood pH


  


  7.37


(7.35-7.45) 


  


 


 


Arterial Blood pCO2 at


Patient Temp 


  73mmHg (35-46) 


  


  


 


 


Arterial Blood pO2 at Patient


Temp 


  107mmHg


() 


  


 


 


Arterial Blood HCO3


  


  41mmol/L


(21-28) 


  


 


 


Arterial Blood Base Excess


  


  12mmol/L


(-3-3) 


  


 


 


FiO2  50.0   








Medications





Active Scripts








 Medications  Dose


 Route/Sig Days Date Category


 


 Levetiracetam 500


 Mg Tablet  500 Mg


 PO BID   8/16/16 Reported


 


 Flomax


  (Tamsulosin Hcl)


 0.4 Mg Cap.er.24h  0.4 Mg


 PO DAILY   1/4/16 Reported


 


 Lansoprazole 30


 Mg Capsule.dr  30 Mg


 PO DAILY   1/4/16 Reported


 


 Lasix


  (Furosemide) 20


 Mg Tablet  20 Mg


 PO BID   9/28/15 Reported


 


 [Polyethylene


 Glycol 3350] 17


 GM Packet  17 Gm


 PO PRN DAILY PRN   9/18/15 Rx


 


 Advair 250-50


 Diskus


  (Fluticasone/Salmeterol)


 1 Puff Puff  1 Puff


 IH BID   9/18/15 Rx


 


 Klonopin


  (Clonazepam) 0.5


 Mg Tablet  0.5 Mg


 PO BID   9/18/15 Rx


 


 [Baclofen] 10 MG


 Tablet  20 Mg


 PO BID   9/18/15 Rx


 


 Children's


 Aspirin (Aspirin)


 81 Mg Tab.chew  81 Mg


 PO DAILY   9/18/15 Rx


 


 [Amitriptyline


 Hcl] 25 MG Tablet  75 Mg


 PO QHS   9/18/15 Rx


 


 Norvasc


  (Amlodipine


 Besylate) 2.5 Mg


 Tablet  2.5 Mg


 PO DAILY   9/18/15 Rx


 


 Ventolin Hfa


 Inhaler


  (Albuterol


 Sulfate) 1 Puff


 Puff  2 Puff


 INH PRN Q4HRS PRN   9/18/15 Rx








Comments


cxr reviewed, copd changes





Impression


.


IMPRESSION:


1.  Acute on chronic respiratory failure, multifactorial in etiology.


2.  Acute exacerbation of chronic obstructive pulmonary disease.


3.  Acute bronchitis.


4.  Tobacco habituation.


5.  History of seizure disorder.


6.  Gastroesophageal reflux disease.





Plan


.


PLAN AND RECOMMENDATION:


1.  Titrate FiO2 to keep O2 saturation 92%.


2.  BiPAP p.r.n. during day and continuously at night.


3.  Bronchodilator.


4.  inhaled corticosteroid.


5.  change Solu-Medrol to prednisone 40 mg daily


6.  Rocephin.


7.  Protonix for stress ulcer prophylaxis.


8.  Lovenox for DVT prophylaxis.


9.  Monitor respiratory status very closely.


10. add singulair 


11. I had a long discussion with him regarding smoking cessation.  I have


advised him to stop smoking forever.





discussed w pt, rn








TAVIA KINGSTON MD Mar 18, 2017 09:56

## 2017-03-19 VITALS — DIASTOLIC BLOOD PRESSURE: 56 MMHG | SYSTOLIC BLOOD PRESSURE: 98 MMHG

## 2017-03-19 VITALS — DIASTOLIC BLOOD PRESSURE: 67 MMHG | SYSTOLIC BLOOD PRESSURE: 108 MMHG

## 2017-03-19 VITALS — SYSTOLIC BLOOD PRESSURE: 122 MMHG | DIASTOLIC BLOOD PRESSURE: 84 MMHG

## 2017-03-19 VITALS — SYSTOLIC BLOOD PRESSURE: 116 MMHG | DIASTOLIC BLOOD PRESSURE: 71 MMHG

## 2017-03-19 VITALS — DIASTOLIC BLOOD PRESSURE: 64 MMHG | SYSTOLIC BLOOD PRESSURE: 113 MMHG

## 2017-03-19 VITALS — DIASTOLIC BLOOD PRESSURE: 68 MMHG | SYSTOLIC BLOOD PRESSURE: 111 MMHG

## 2017-03-19 PROCEDURE — 5A09357 ASSISTANCE WITH RESPIRATORY VENTILATION, LESS THAN 24 CONSECUTIVE HOURS, CONTINUOUS POSITIVE AIRWAY PRESSURE: ICD-10-PCS | Performed by: FAMILY MEDICINE

## 2017-03-19 RX ADMIN — IPRATROPIUM BROMIDE AND ALBUTEROL SULFATE SCH ML: .5; 3 SOLUTION RESPIRATORY (INHALATION) at 05:58

## 2017-03-19 RX ADMIN — Medication SCH MLS/HR: at 11:22

## 2017-03-19 RX ADMIN — PANTOPRAZOLE SODIUM SCH MG: 40 TABLET, DELAYED RELEASE ORAL at 07:52

## 2017-03-19 RX ADMIN — ENOXAPARIN SODIUM SCH MG: 40 INJECTION SUBCUTANEOUS at 11:21

## 2017-03-19 RX ADMIN — IPRATROPIUM BROMIDE AND ALBUTEROL SULFATE SCH ML: .5; 3 SOLUTION RESPIRATORY (INHALATION) at 19:58

## 2017-03-19 RX ADMIN — CLONAZEPAM SCH MG: 0.5 TABLET ORAL at 21:31

## 2017-03-19 RX ADMIN — FUROSEMIDE SCH MG: 20 TABLET ORAL at 09:27

## 2017-03-19 RX ADMIN — IPRATROPIUM BROMIDE AND ALBUTEROL SULFATE SCH ML: .5; 3 SOLUTION RESPIRATORY (INHALATION) at 11:06

## 2017-03-19 RX ADMIN — BACLOFEN SCH MG: 10 TABLET ORAL at 21:31

## 2017-03-19 RX ADMIN — MONTELUKAST SODIUM SCH MG: 10 TABLET, FILM COATED ORAL at 21:30

## 2017-03-19 RX ADMIN — PREDNISONE SCH MG: 10 TABLET ORAL at 09:28

## 2017-03-19 RX ADMIN — LEVETIRACETAM SCH MG: 500 TABLET, FILM COATED ORAL at 21:31

## 2017-03-19 RX ADMIN — FUROSEMIDE SCH MG: 20 TABLET ORAL at 13:27

## 2017-03-19 RX ADMIN — FLUTICASONE PROPIONATE SCH SPRAY: 50 SPRAY, METERED NASAL at 09:27

## 2017-03-19 RX ADMIN — BUDESONIDE SCH MG: 0.5 SUSPENSION RESPIRATORY (INHALATION) at 05:58

## 2017-03-19 RX ADMIN — LEVETIRACETAM SCH MG: 500 TABLET, FILM COATED ORAL at 09:27

## 2017-03-19 RX ADMIN — IPRATROPIUM BROMIDE AND ALBUTEROL SULFATE SCH ML: .5; 3 SOLUTION RESPIRATORY (INHALATION) at 15:33

## 2017-03-19 RX ADMIN — ASPIRIN 81 MG CHEWABLE TABLET SCH MG: 81 TABLET CHEWABLE at 07:52

## 2017-03-19 RX ADMIN — BUDESONIDE SCH MG: 0.5 SUSPENSION RESPIRATORY (INHALATION) at 19:58

## 2017-03-19 RX ADMIN — TAMSULOSIN HYDROCHLORIDE SCH MG: 0.4 CAPSULE ORAL at 09:27

## 2017-03-19 RX ADMIN — CLONAZEPAM SCH MG: 0.5 TABLET ORAL at 09:27

## 2017-03-19 RX ADMIN — BACLOFEN SCH MG: 10 TABLET ORAL at 09:27

## 2017-03-19 RX ADMIN — AMITRIPTYLINE HYDROCHLORIDE SCH MG: 25 TABLET, FILM COATED ORAL at 21:31

## 2017-03-19 RX ADMIN — AMLODIPINE BESYLATE SCH MG: 2.5 TABLET ORAL at 09:27

## 2017-03-19 NOTE — PDOC
PULMONARY PROGRESS NOTES


Subjective


on 02, used bipap last night, sob, better, has cough and back pain, has runny 

nose, post nasal drip


Vitals





 Vital Signs








  Date Time  Temp Pulse Resp B/P Pulse Ox O2 Delivery O2 Flow Rate FiO2


 


3/19/17 05:58      BiPAP/CPAP  


 


3/19/17 02:59 97.0 80 18 113/64 93   





 97.0       


 


3/18/17 22:56       5.0 








Comments


ros as mentioned as above other sys otherwise neg


ROS:  No Nausea, No Chest Pain, No Abdominal Pain


General:  Alert, Oriented X4 (nc at perrl,  nose, inflamed mucosa, throat clear)

, Lethargic


HEENT:  Other (nc at perrl throat clear)


Lungs:  Wheezing, Other


Cardiovascular:  S1


Abdomen:  Soft, Non-tender


Neuro Exam:  Alert


Extremities:  Other


Skin:  Warm


Medications





Active Scripts








 Medications  Dose


 Route/Sig Days Date Category


 


 Levetiracetam 500


 Mg Tablet  500 Mg


 PO BID   8/16/16 Reported


 


 Flomax


  (Tamsulosin Hcl)


 0.4 Mg Cap.er.24h  0.4 Mg


 PO DAILY   1/4/16 Reported


 


 Lansoprazole 30


 Mg Capsule.dr  30 Mg


 PO DAILY   1/4/16 Reported


 


 Lasix


  (Furosemide) 20


 Mg Tablet  20 Mg


 PO BID   9/28/15 Reported


 


 [Polyethylene


 Glycol 3350] 17


 GM Packet  17 Gm


 PO PRN DAILY PRN   9/18/15 Rx


 


 Advair 250-50


 Diskus


  (Fluticasone/Salmeterol)


 1 Puff Puff  1 Puff


 IH BID   9/18/15 Rx


 


 Klonopin


  (Clonazepam) 0.5


 Mg Tablet  0.5 Mg


 PO BID   9/18/15 Rx


 


 [Baclofen] 10 MG


 Tablet  20 Mg


 PO BID   9/18/15 Rx


 


 Children's


 Aspirin (Aspirin)


 81 Mg Tab.chew  81 Mg


 PO DAILY   9/18/15 Rx


 


 [Amitriptyline


 Hcl] 25 MG Tablet  75 Mg


 PO QHS   9/18/15 Rx


 


 Norvasc


  (Amlodipine


 Besylate) 2.5 Mg


 Tablet  2.5 Mg


 PO DAILY   9/18/15 Rx


 


 Ventolin Hfa


 Inhaler


  (Albuterol


 Sulfate) 1 Puff


 Puff  2 Puff


 INH PRN Q4HRS PRN   9/18/15 Rx








Comments


cxr reviewed, copd changes





Impression


.


IMPRESSION:


1.  Acute on chronic respiratory failure, multifactorial in etiology.


2.  Acute exacerbation of chronic obstructive pulmonary disease.


3.  Acute bronchitis.


4.  Tobacco habituation.


5.  History of seizure disorder.


6.  Gastroesophageal reflux disease.





Plan


.


PLAN AND RECOMMENDATION:


1.  Titrate FiO2 to keep O2 saturation 92%.


2.  BiPAP p.r.n. during day and continuously at night.


3.  Bronchodilator.


4.  inhaled corticosteroid.


5.  prednisone 40 mg daily


6.  Rocephin.


7.  Protonix for stress ulcer prophylaxis.


8.  Lovenox for DVT prophylaxis.


9.  Monitor respiratory status very closely.


10. singulair, add flonase


11. I had a long discussion with him regarding smoking cessation.  I have


advised him to stop smoking forever.





discussed w pt, rn








TAVIA KINGSTON MD Mar 19, 2017 08:50

## 2017-03-19 NOTE — PDOC
GENERAL


General:


vss and afebrile. awake and alert. some decreased in sob. chest decreased 

breath sounds and less rhonchi,wheezes. right neck cramps during night and some 

trapezius tenderness. will add k-pad for same. otherwise same and await 

clearing exac of copd. O2 at 5L/NC.


Problems:  





VITAL SIGNS


Vital Signs:





 Vital Signs








  Date Time  Temp Pulse Resp B/P Pulse Ox O2 Delivery O2 Flow Rate FiO2


 


3/19/17 11:13     96 Nasal Cannula 5.0 


 


3/19/17 09:27  78  122/84    


 


3/19/17 07:00 98.5  12     





 98.5       











I & O


I & O











 Intake and Output 


 


 3/19/17





 07:00


 


Intake Total 2640 ml


 


Output Total 1775 ml


 


Balance 865 ml


 


 


 


Intake Oral 2640 ml


 


Output Urine Total 1775 ml


 


# Voids 1











ALLERGIES


Allergies:





 Allergies








Coded Allergies Type Severity Reaction Last Updated Verified


 


  midazolam Allergy Severe Anaphylaxis 3/16/17 Yes


 


  paroxetine Allergy Severe seizures 3/16/17 Yes


 


  I S O L A T I O N *CONTACT* Allergy Unknown  3/16/17 Yes











MEDS


Medications:





 Current Medications








 Medications


  (Trade)  Dose


 Ordered  Sig/Garrick  Start Time


 Stop Time Status Last Admin


Dose Admin


 


 Albuterol Sulfate


  (Ventolin Neb


 Soln)  2.5 mg  PRN Q4HRS  PRN  3/16/17 18:00


    3/16/17 20:50


2.5 MG


 


 Albuterol/


 Ipratropium


  (Duoneb)  3 ml  RTQID  3/17/17 08:00


    3/19/17 11:06


3 ML


 


 Amitriptyline HCl


  (Elavil)  75 mg  QHS  3/16/17 21:00


    3/18/17 21:01


75 MG


 


 Amlodipine


 Besylate


  (Norvasc)  2.5 mg  DAILY  3/17/17 09:00


    3/19/17 09:27


2.5 MG


 


 Aspirin


  (Children'S


 Aspirin)  81 mg  [id number]  3/17/17 08:00


    3/19/17 07:52


81 MG


 


 Baclofen


  (Lioresal)  20 mg  BID  3/16/17 21:00


    3/19/17 09:27


20 MG


 


 Budesonide


  (Pulmicort)  0.5 mg  RTBID  3/17/17 11:00


    3/19/17 05:58


0.5 MG


 


 Ceftriaxone


 Sodium/Sodium


 Chloride


  (Rocephin/Iv


 Sodium Chloride


 0.9% 50ml)  50 ml @ 


 100 mls/hr  Q24H  3/17/17 11:00


    3/18/17 12:42


100 MLS/HR


 


 Clonazepam


  (Klonopin)  0.5 mg  BID  3/16/17 21:00


    3/19/17 09:27


0.5 MG


 


 Enoxaparin Sodium


  (Lovenox 40mg


 Syringe)  40 mg  Q24H  3/17/17 11:00


    3/18/17 12:43


40 MG


 


 Fluticasone


 Propionate


  (Flonase)  2 spray  DAILY  3/19/17 09:00


    3/19/17 09:27


2 SPRAY


 


 Furosemide


  (Lasix)  20 mg  BID92  3/17/17 09:00


    3/19/17 09:27


20 MG


 


 Levetiracetam


  (Keppra)  500 mg  BID  3/16/17 21:00


    3/19/17 09:27


500 MG


 


 Methylprednisolone


 Sodium Succinate


 40 mg  40 mg  Q12HR  3/17/17 09:00


 3/18/17 09:58 DC 3/18/17 08:40


40 MG


 


 Montelukast Sodium


  (Singulair)  10 mg  QHS  3/18/17 21:00


    3/18/17 21:02


10 MG


 


 Pantoprazole


 Sodium


  (Protonix)  40 mg  DAILYAC  3/17/17 11:30


 3/17/17 15:53 DC 3/17/17 11:45


40 MG


 


 Polyethylene


 Glycol


  (miraLAX PACKET)  17 gm  PRN DAILY  PRN  3/16/17 18:00


     


 


 


 Prednisone


  (Prednisone)  40 mg  DAILY  3/19/17 09:00


    3/19/17 09:28


40 MG


 


 Sodium Chloride


  (Iv Sodium


 Chloride 0.9%


 1000ml Bag)  1,000 ml @ 


 100 mls/hr  Q10H  3/16/17 12:38


 3/16/17 22:37 DC 3/16/17 13:08


100 MLS/HR


 


 Tamsulosin HCl


  (Flomax)  0.4 mg  DAILY  3/17/17 09:00


    3/19/17 09:27


0.4 MG














DAWSON SY MD Mar 19, 2017 11:17

## 2017-03-20 VITALS — SYSTOLIC BLOOD PRESSURE: 116 MMHG | DIASTOLIC BLOOD PRESSURE: 60 MMHG

## 2017-03-20 VITALS
SYSTOLIC BLOOD PRESSURE: 100 MMHG | DIASTOLIC BLOOD PRESSURE: 62 MMHG | SYSTOLIC BLOOD PRESSURE: 100 MMHG | DIASTOLIC BLOOD PRESSURE: 62 MMHG | DIASTOLIC BLOOD PRESSURE: 62 MMHG | SYSTOLIC BLOOD PRESSURE: 100 MMHG

## 2017-03-20 VITALS — DIASTOLIC BLOOD PRESSURE: 67 MMHG | SYSTOLIC BLOOD PRESSURE: 128 MMHG

## 2017-03-20 RX ADMIN — FUROSEMIDE SCH MG: 20 TABLET ORAL at 13:24

## 2017-03-20 RX ADMIN — LEVETIRACETAM SCH MG: 500 TABLET, FILM COATED ORAL at 08:30

## 2017-03-20 RX ADMIN — Medication SCH MLS/HR: at 11:53

## 2017-03-20 RX ADMIN — ASPIRIN 81 MG CHEWABLE TABLET SCH MG: 81 TABLET CHEWABLE at 08:30

## 2017-03-20 RX ADMIN — AMLODIPINE BESYLATE SCH MG: 2.5 TABLET ORAL at 08:31

## 2017-03-20 RX ADMIN — FLUTICASONE PROPIONATE SCH SPRAY: 50 SPRAY, METERED NASAL at 08:31

## 2017-03-20 RX ADMIN — ENOXAPARIN SODIUM SCH MG: 40 INJECTION SUBCUTANEOUS at 11:53

## 2017-03-20 RX ADMIN — PANTOPRAZOLE SODIUM SCH MG: 40 TABLET, DELAYED RELEASE ORAL at 08:30

## 2017-03-20 RX ADMIN — PREDNISONE SCH MG: 10 TABLET ORAL at 08:30

## 2017-03-20 RX ADMIN — ALBUTEROL SULFATE PRN MG: 108 AEROSOL, METERED RESPIRATORY (INHALATION) at 07:22

## 2017-03-20 RX ADMIN — BACLOFEN SCH MG: 10 TABLET ORAL at 08:30

## 2017-03-20 RX ADMIN — FUROSEMIDE SCH MG: 20 TABLET ORAL at 08:30

## 2017-03-20 RX ADMIN — BUDESONIDE SCH MG: 0.5 SUSPENSION RESPIRATORY (INHALATION) at 07:22

## 2017-03-20 RX ADMIN — IPRATROPIUM BROMIDE AND ALBUTEROL SULFATE SCH ML: .5; 3 SOLUTION RESPIRATORY (INHALATION) at 07:25

## 2017-03-20 RX ADMIN — TAMSULOSIN HYDROCHLORIDE SCH MG: 0.4 CAPSULE ORAL at 08:30

## 2017-03-20 RX ADMIN — IPRATROPIUM BROMIDE AND ALBUTEROL SULFATE SCH ML: .5; 3 SOLUTION RESPIRATORY (INHALATION) at 11:25

## 2017-03-20 RX ADMIN — CLONAZEPAM SCH MG: 0.5 TABLET ORAL at 08:30

## 2017-03-20 NOTE — DS
DATE OF DISCHARGE:  03/20/2017



HOSPITAL SUMMARY:  A 56-year-old white male with end-stage COPD and chronic

tobacco abuse presented with cough, wheezing, and shortness of breath.  Chest

x-ray x2 showed COPD with no overt infiltrates seen.  CBC and chemistry profile

were unremarkable as was the BNP, and flu screens were negative.  Blood cultures

had no growth.  He was treated with IV Solu-Medrol and Rocephin and he is

feeling comfortable to be discharged and followed as an outpatient at this time.



FINAL DIAGNOSIS:  Acute exacerbation of chronic obstructive pulmonary disease.



OPERATIONS, PROCEDURES, AND COMPLICATIONS:  None.



CONSULTATIONS:  Dr. Voss and Dr. Thorpe.



DISPOSITION:  Prednisone taper over 10 days, Augmentin 875 twice a day for 1

week.  Continue all home medications, remain the same as well as oxygen. 

Complete tobacco avoidance was encouraged.  His prognosis is poor because of

severe chronic obstructive pulmonary disease and chronic tobacco use.

 



______________________________

MONIKA AHUMADA MD



DR:  SANJUANITA/nts  JOB#:  [id number] / [id number]

DD:  03/20/2017 08:13  DT:  03/20/2017 11:30

## 2017-03-20 NOTE — DISCH
DISCHARGE INSTRUCTIONS


Condition on Discharge


Condition on Discharge:  Stable





Activity After Discharge


Activity Instructions for Disc:  No restrictions





Diet after Discharge


Diet after Discharge:  Regular





Follow-Up


Follow up with:  MONIKA Major MD Mar 20, 2017 08:11

## 2017-03-20 NOTE — PDOC
PULMONARY PROGRESS NOTES


Subjective


on 02, used bipap last night, sob, better, has cough and back pain, has runny 

nose, post nasal drip


Vitals





 Vital Signs








  Date Time  Temp Pulse Resp B/P Pulse Ox O2 Delivery O2 Flow Rate FiO2


 


3/20/17 11:25     97 Nasal Cannula 4.0 


 


3/20/17 10:49 97.5 92 18 100/62    





 97.5       








Comments


ros as mentioned as above other sys otherwise neg


ROS:  No Nausea, No Chest Pain, No Abdominal Pain


General:  Alert, Oriented X4 (nc at perrl,  nose, inflamed mucosa, throat clear)

, Lethargic


HEENT:  Other (nc at perrl throat clear)


Lungs:  Wheezing, Other


Cardiovascular:  S1


Abdomen:  Soft, Non-tender


Neuro Exam:  Alert


Extremities:  Other


Skin:  Warm


Medications





Active Scripts








 Medications  Dose


 Route/Sig Days Date Category


 


 Levetiracetam 500


 Mg Tablet  500 Mg


 PO BID   8/16/16 Reported


 


 Flomax


  (Tamsulosin Hcl)


 0.4 Mg Cap.er.24h  0.4 Mg


 PO DAILY   1/4/16 Reported


 


 Lansoprazole 30


 Mg Capsule.dr  30 Mg


 PO DAILY   1/4/16 Reported


 


 Lasix


  (Furosemide) 20


 Mg Tablet  20 Mg


 PO BID   9/28/15 Reported


 


 [Polyethylene


 Glycol 3350] 17


 GM Packet  17 Gm


 PO PRN DAILY PRN   9/18/15 Rx


 


 Advair 250-50


 Diskus


  (Fluticasone/Salmeterol)


 1 Puff Puff  1 Puff


 IH BID   9/18/15 Rx


 


 Klonopin


  (Clonazepam) 0.5


 Mg Tablet  0.5 Mg


 PO BID   9/18/15 Rx


 


 [Baclofen] 10 MG


 Tablet  20 Mg


 PO BID   9/18/15 Rx


 


 Children's


 Aspirin (Aspirin)


 81 Mg Tab.chew  81 Mg


 PO DAILY   9/18/15 Rx


 


 [Amitriptyline


 Hcl] 25 MG Tablet  75 Mg


 PO QHS   9/18/15 Rx


 


 Norvasc


  (Amlodipine


 Besylate) 2.5 Mg


 Tablet  2.5 Mg


 PO DAILY   9/18/15 Rx


 


 Ventolin Hfa


 Inhaler


  (Albuterol


 Sulfate) 1 Puff


 Puff  2 Puff


 INH PRN Q4HRS PRN   9/18/15 Rx








Comments


cxr reviewed, copd changes





Impression


.


IMPRESSION:


1.  Acute on chronic respiratory failure, multifactorial in etiology.


2.  Acute exacerbation of chronic obstructive pulmonary disease.


3.  Acute bronchitis.


4.  Tobacco habituation.


5.  History of seizure disorder.


6.  Gastroesophageal reflux disease.





Plan


.


PLAN AND RECOMMENDATION:


1.  Titrate FiO2 to keep O2 saturation 92%.


2.  BiPAP p.r.n. during day and continuously at night.


3.  Bronchodilator.


4.  inhaled corticosteroid.


5.  prednisone 40 mg daily


6.  Rocephin.


7.  Protonix for stress ulcer prophylaxis.


8.  Lovenox for DVT prophylaxis.


9.  Monitor respiratory status very closely.


10. singulair, add flonase


11. I had a long discussion with him regarding smoking cessation.  I have


advised him to stop smoking forever.





discussed w pt, rn








HORTENSIA GARCIA MD Mar 20, 2017 12:23

## 2017-04-10 ENCOUNTER — HOSPITAL ENCOUNTER (OUTPATIENT)
Dept: HOSPITAL 61 - PNCL | Age: 57
Discharge: HOME | End: 2017-04-10
Attending: ANESTHESIOLOGY
Payer: MEDICARE

## 2017-04-10 DIAGNOSIS — G89.4: Primary | ICD-10-CM

## 2017-04-10 DIAGNOSIS — R51: ICD-10-CM

## 2017-04-10 DIAGNOSIS — R25.2: ICD-10-CM

## 2017-04-10 PROCEDURE — 95991 SPIN/BRAIN PUMP REFIL & MAIN: CPT

## 2017-04-11 NOTE — PAIN
DATE OF SERVICE:  04/10/2017



PROGRESS NOTE FOR PAIN CLINIC



DIAGNOSES:

1.  Chronic pain syndrome.

2.  Spasticity.

3.  Headaches with intrathecal pump morphine therapy.



HISTORY OF PRESENT ILLNESS:  The patient is a 57-year-old male returns for

followup status post intrathecal pump therapy with morphine running at 8.8 mg

per day.  The patient and his wife convey that he has had 2 episodes of apnea

requiring resuscitative measures and hospitalization and reports he is not

taking any other narcotics at home.  I reviewed his medication list with him and

his wife today.  I strongly encouraged him to stop taking his clonazepam as this

may be contributing with a combination of the intrathecal pump morphine to

respiratory depression.  The patient reports that he has not had any problems

with this before; however, on review has had several instances like this where

he has been hospitalized ____ he was taking hydrocodone at home.  The patient

reports he is not taking any other pain medications as noted.  Again, encouraged

him to stop taking the benzodiazepine and to speak with his prescribing

physician about this.  The patient otherwise is doing fairly well with the

medication regimen.  Reports his pain is about 80% improved with the intrathecal

pump and no other side effects.  No itching, nausea, constipation or memory

loss, but apparently some significant respiratory depression from time to time,

most recently in January of this year and patient's wife reports again about one

month ago, although I have no records to back this at the time of this

dictation.  Otherwise, again no side effects.  The patient reports his pain is

anywhere from a 5-7 on a scale of 10, worse with activity, in the base of the

neck, shoulders, upper back, low back as well, which is otherwise well

controlled with the intrathecal pump medication.  The patient reports his pain

as radiating, constant, severe, stabbing, cramping, burning, tingling, shooting,

dull, sharp and aching alternating mostly in upper mid back, but also in the low

back itself.



PHYSICAL EXAMINATION:

VITAL SIGNS:  Today, blood pressure 121/70, pulse ____, respirations are 18,

temperature 98.2 degrees Fahrenheit.  Height is 5 feet 7 inches, weight is 148

pounds.

GENERAL:  The patient is awake, alert, oriented, appropriate, very pleasant

demeanor.  The patient is oriented to time, place and person.

HEENT:  Shows normocephalic and atraumatic.  Extraocular movements are intact

and symmetrical.  Oral cavity, mucous membranes are moist and pink.  Dentition

is in some poor condition, but is intact.  Oral cavity, mucous membranes are

moist and pink.

NECK:  Shows anterior throat supple.  Swallow reflex is symmetrical.

CHEST:  Shows normal on inspection.  On auscultation, shows breath sounds clear

bilaterally without rales, rhonchi or wheezes auscultated.

HEART:  Shows S1 and S2 clear.  No murmurs are auscultated.

ABDOMEN:  Soft, nontender, nondistended.  No palpable organomegaly is noted.  No

rebound or guarding demonstrated.  Easily palpable intrathecal pump is noted in

the left lower quadrant with well-healed surgical scarring over, it is mobile

and nontender with palpation.

EXTREMITIES:  Upper extremities showed deep tendon reflexes 1+ in the biceps and

triceps tendons.  Motor exam is approximately 4 on a scale of 5, but equal with

 strength, biceps and triceps flexion and symmetrical.  Lower extremities

show intact dorsiflexion, extension is about 3-4 on a scale of 10 bilaterally,

but again it is symmetrical with dorsiflexion, extension, quadriceps and

hamstring flexion is about 4 on a scale of 5, but again symmetrical.



Options were discussed with the patient.  The patient's old chart was reviewed. 

His current medication regimen updated.  Current review of systems updated today

as well and we will refill the patient's intrathecal pump and program to

decrease his 24-hour dose from 8.8 mg to 6.0 mg, 32% decrease.  I have discussed

this with both he and his wife and they are both in agreement that his pain

control is fairly well managed, but the respiratory depression seems to be

becoming more frequent and we will decrease the amounts and see how he does with

this.  Also, again stressed significantly to decrease and eliminate his

clonazepam.  The patient will discuss this with his primary physician.  Also

will discuss any other medications that might be decreased or eliminated with

his prescribing primary physician.



DIAGNOSES:  Chronic pain syndrome with spasticity and headaches, low back pain

and cervicalgia.



Risks were discussed including bleeding, infection, extravasation of medication

from the pump and possible associated measures as well as poor results regarding

pain control.  The patient will follow up prior to 09/06/2017, which is the

refill date as the pump is calculated this time or prior to this if necessary.



PROCEDURE:  Intrathecal pump refill and reprogramming with sterile prep and

drape under local topical anesthesia, EMLA cream.  The patient's pump was

accessed with a 22-gauge noncutting Medtr[employer] needle without difficulty.  A 2 mL

of medication was withdrawn under sterile technique.  A 20 mL of new medication

containing morphine 50 mg/mL was then infused into the pump.  Needle was

removed.  Sterile bandage was applied.  The patient tolerated procedure well,

had no complications.



CONDITION AT DISCHARGE:  Stable.  The patient was awake, alert and appropriate

on discharge.

 



______________________________

AGUSTÍN DIEZ MD



DR:  FÉLIX/duane  JOB#:  [id number] / [id number]

DD:  04/10/2017 12:28  DT:  04/11/2017 02:26

## 2017-05-18 ENCOUNTER — HOSPITAL ENCOUNTER (OUTPATIENT)
Dept: HOSPITAL 61 - PNCL | Age: 57
Discharge: HOME | End: 2017-05-18
Attending: ANESTHESIOLOGY
Payer: MEDICARE

## 2017-05-18 DIAGNOSIS — G89.4: Primary | ICD-10-CM

## 2017-05-18 DIAGNOSIS — R51: ICD-10-CM

## 2017-05-18 DIAGNOSIS — R25.2: ICD-10-CM

## 2017-05-18 PROCEDURE — G0463 HOSPITAL OUTPT CLINIC VISIT: HCPCS

## 2017-05-19 NOTE — PAIN
DATE OF SERVICE:  05/19/2017



DIAGNOSES:

1.  Chronic pain syndrome.

2.  Spasticity.

3.  Headaches.



HISTORY OF PRESENT ILLNESS:  The patient is a 56-year-old male who returns for

followup status post chronic pain management with intrathecal pump with

morphine.  The patient was originally 8.8 mg per day.  He was having some

difficulty with sedation, drowsiness and had several episodes where he was

admitted to the hospital with low respiratory rate since we had changed back to

6 mg per day routine rate where he was doing better, but having significant

increase in pain.  The patient also reports he has been taken off of some of his

blood pressure medications, which he has had no longer had any further sedation

or drowsiness or dizziness or any orthostasis since this has changed and the

patient reports he still has some significant pain; however, is much more alert

and awake and definitely appears that way today.  The patient has pain in the

base of the neck, shoulders, upper extremities, somewhat worse on the left than

the right, but into the entire upper and mid back, ranging 8 on a scale of 10 at

its worse, 5 on a scale 10 currently.  The patient reports still significant

pain.  No significant side effects with his morphine; however, occasional

constipation, but he is controlling this with hydration and MiraLax, which he

reports does work well for him.



PHYSICAL EXAMINATION:

VITAL SIGNS:  The patient's blood pressure 140/72, pulse 107, respirations are

18, temperature 98.2 degrees Fahrenheit, and weight is 147 pounds.

GENERAL:  The patient is awake, alert, oriented, appropriate, very pleasant

demeanor.

HEENT:  Head shows normocephalic and atraumatic.  Extraocular movements are

intact, symmetrical.  Oral cavity, mucous membranes are moist and pink. 

Dentition is intact.

NECK:  Shows anterior throat supple without palpable lymphadenopathy noted. 

Swallow reflex is symmetrical.

CHEST:  Shows normal on inspection.  Breath sounds are equal and clear

bilaterally.

HEART:  Shows S1 and S2 clear.  No murmurs auscultated.

ABDOMEN:  Soft, nontender, and nondistended.  No palpable organomegaly.  No

rebound or guarding demonstrated.  Easily palpable intrathecal pump is noted in

the left lower quadrant, which is mobile, but nontender.

BACK:  The patient's back shows spine grossly midline.  There is moderate

tenderness throughout the thoracic paraspinous, lumbar paraspinous muscles

bilaterally.  They are firm, but without specific radiation, without trigger

points.



Options were discussed with the patient.  The patient's old chart was reviewed

as his current medication regimen and updated.  Current review of systems

updated today as well and we will change the patient's intrathecal rate from 6

mg a day to 7 mg per day with instructions and side effects to be aware of,

discussed with the medication change.  The patient will follow up in

approximately 1 week with a progress report at that time and sooner if

necessary.  We will change the patient's refill date to 08/21/2016 from the rate

change.  Again, this patient has tolerated much higher doses of this in the past

and is having significant increase in pain, but increase in alertness and less

hypotension and no more falls or dizziness or apneic episodes since his blood

pressure medicines have been discontinued and/or change.  We will make this

change for his pain control.  The patient was asked to call the clinic in

approximately 24 hours for followup at that time as well.  We will check on him

tomorrow and see how he is doing.

 



______________________________

AGUSTÍN DIEZ MD



DR:  FÉLIX/duane  JOB#:  [id number] / [id number]

DD:  05/18/2017 13:53  DT:  05/19/2017 01:51

## 2017-07-11 ENCOUNTER — HOSPITAL ENCOUNTER (OUTPATIENT)
Dept: HOSPITAL 61 - ER | Age: 57
Setting detail: OBSERVATION
LOS: 1 days | Discharge: HOME | End: 2017-07-12
Attending: FAMILY MEDICINE | Admitting: FAMILY MEDICINE
Payer: MEDICARE

## 2017-07-11 VITALS — DIASTOLIC BLOOD PRESSURE: 76 MMHG | SYSTOLIC BLOOD PRESSURE: 113 MMHG

## 2017-07-11 VITALS — WEIGHT: 149.06 LBS | BODY MASS INDEX: 23.39 KG/M2 | HEIGHT: 67 IN

## 2017-07-11 DIAGNOSIS — E86.0: ICD-10-CM

## 2017-07-11 DIAGNOSIS — J44.9: ICD-10-CM

## 2017-07-11 DIAGNOSIS — N18.4: ICD-10-CM
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
## 2017-07-11 DIAGNOSIS — E87.5: ICD-10-CM

## 2017-07-11 DIAGNOSIS — K21.9: ICD-10-CM

## 2017-07-11 DIAGNOSIS — Z99.81: ICD-10-CM

## 2017-07-11 DIAGNOSIS — G90.50: ICD-10-CM

## 2017-07-11 DIAGNOSIS — N17.9: ICD-10-CM

## 2017-07-11 DIAGNOSIS — T67.5XXA: Primary | ICD-10-CM
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
## 2017-07-11 DIAGNOSIS — E11.22: ICD-10-CM
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
## 2017-07-11 DIAGNOSIS — F41.9: ICD-10-CM

## 2017-07-11 LAB
ALBUMIN SERPL-MCNC: 3.7 G/DL (ref 3.4–5)
ALBUMIN/GLOB SERPL: 1 {RATIO} (ref 1–1.7)
ALP SERPL-CCNC: 120 U/L (ref 46–116)
ALT SERPL-CCNC: 19 U/L (ref 16–63)
ANION GAP SERPL CALC-SCNC: 7 MMOL/L (ref 6–14)
AST SERPL-CCNC: 21 U/L (ref 15–37)
BACTERIA #/AREA URNS HPF: 0 /HPF
BARBITURATES UR-MCNC: (no result) UG/ML
BASOPHILS # BLD AUTO: 0 X10^3/UL (ref 0–0.2)
BASOPHILS NFR BLD: 1 % (ref 0–3)
BENZODIAZ UR-MCNC: (no result) UG/L
BILIRUB SERPL-MCNC: 0.3 MG/DL (ref 0.2–1)
BILIRUB UR QL STRIP: NEGATIVE
BUN SERPL-MCNC: 13 MG/DL (ref 8–26)
BUN/CREAT SERPL: 8 (ref 6–20)
CALCIUM SERPL-MCNC: 8.8 MG/DL (ref 8.5–10.1)
CANNABINOIDS UR-MCNC: (no result) UG/L
CHLORIDE SERPL-SCNC: 99 MMOL/L (ref 98–107)
CK SERPL-CCNC: 180 U/L (ref 39–308)
CO2 SERPL-SCNC: 36 MMOL/L (ref 21–32)
COCAINE UR-MCNC: (no result) NG/ML
CREAT SERPL-MCNC: 1.7 MG/DL (ref 0.7–1.3)
EOSINOPHIL NFR BLD: 0 % (ref 0–3)
ERYTHROCYTE [DISTWIDTH] IN BLOOD BY AUTOMATED COUNT: 15.5 % (ref 11.5–14.5)
GFR SERPLBLD BASED ON 1.73 SQ M-ARVRAT: 41.5 ML/MIN
GLOBULIN SER-MCNC: 3.7 G/DL (ref 2.2–3.8)
GLUCOSE SERPL-MCNC: 146 MG/DL (ref 70–99)
GLUCOSE UR STRIP-MCNC: NEGATIVE MG/DL
HCO3 BLDA-SCNC: 31 MMOL/L (ref 21–28)
HCT VFR BLD CALC: 46 % (ref 39–53)
HGB BLD-MCNC: 15 G/DL (ref 13–17.5)
LYMPHOCYTES # BLD: 0.8 X10^3/UL (ref 1–4.8)
LYMPHOCYTES NFR BLD AUTO: 9 % (ref 24–48)
MCH RBC QN AUTO: 30 PG (ref 25–35)
MCHC RBC AUTO-ENTMCNC: 33 G/DL (ref 31–37)
MCV RBC AUTO: 91 FL (ref 79–100)
METHADONE SERPL-MCNC: (no result) NG/ML
MONOCYTES NFR BLD: 5 % (ref 0–9)
NEUTROPHILS NFR BLD AUTO: 85 % (ref 31–73)
NITRITE UR QL STRIP: NEGATIVE
OPIATES UR-MCNC: (no result) NG/ML
PCO2 BLDA: 49 MMHG (ref 35–46)
PCP SERPL-MCNC: (no result) MG/DL
PH ABG: 7.43 (ref 7.35–7.45)
PH UR STRIP: 5.5 [PH]
PLATELET # BLD AUTO: 226 X10^3/UL (ref 140–400)
PLATELET # BLD EST: ADEQUATE 10*3/UL
PO2 BLDA: 63 MMHG (ref 65–108)
POTASSIUM SERPL-SCNC: 5.5 MMOL/L (ref 3.5–5.1)
PROT SERPL-MCNC: 7.4 G/DL (ref 6.4–8.2)
PROT UR STRIP-MCNC: NEGATIVE MG/DL
RBC # BLD AUTO: 5.04 X10^6/UL (ref 4.3–5.7)
RBC #/AREA URNS HPF: (no result) /HPF (ref 0–2)
SAO2 % BLDA: 89 % (ref 92–99)
SODIUM SERPL-SCNC: 142 MMOL/L (ref 136–145)
SP GR UR STRIP: 1.01
SQUAMOUS #/AREA URNS LPF: (no result) /LPF
UROBILINOGEN UR-MCNC: 0.2 MG/DL
WBC # BLD AUTO: 8.8 X10^3/UL (ref 4–11)
WBC #/AREA URNS HPF: 0 /HPF (ref 0–4)

## 2017-07-11 PROCEDURE — 99285 EMERGENCY DEPT VISIT HI MDM: CPT

## 2017-07-11 PROCEDURE — 36600 WITHDRAWAL OF ARTERIAL BLOOD: CPT

## 2017-07-11 PROCEDURE — 94660 CPAP INITIATION&MGMT: CPT

## 2017-07-11 PROCEDURE — 94640 AIRWAY INHALATION TREATMENT: CPT

## 2017-07-11 PROCEDURE — 82550 ASSAY OF CK (CPK): CPT

## 2017-07-11 PROCEDURE — 82805 BLOOD GASES W/O2 SATURATION: CPT

## 2017-07-11 PROCEDURE — G0379 DIRECT REFER HOSPITAL OBSERV: HCPCS

## 2017-07-11 PROCEDURE — 93005 ELECTROCARDIOGRAM TRACING: CPT

## 2017-07-11 PROCEDURE — 96361 HYDRATE IV INFUSION ADD-ON: CPT

## 2017-07-11 PROCEDURE — 87641 MR-STAPH DNA AMP PROBE: CPT

## 2017-07-11 PROCEDURE — 85007 BL SMEAR W/DIFF WBC COUNT: CPT

## 2017-07-11 PROCEDURE — 36415 COLL VENOUS BLD VENIPUNCTURE: CPT

## 2017-07-11 PROCEDURE — 84484 ASSAY OF TROPONIN QUANT: CPT

## 2017-07-11 PROCEDURE — 81001 URINALYSIS AUTO W/SCOPE: CPT

## 2017-07-11 PROCEDURE — 83605 ASSAY OF LACTIC ACID: CPT

## 2017-07-11 PROCEDURE — 71010: CPT

## 2017-07-11 PROCEDURE — 96360 HYDRATION IV INFUSION INIT: CPT

## 2017-07-11 PROCEDURE — A6539 GC STOCKING WAISTLNGTH 18-30: HCPCS

## 2017-07-11 PROCEDURE — 94760 N-INVAS EAR/PLS OXIMETRY 1: CPT

## 2017-07-11 PROCEDURE — 85027 COMPLETE CBC AUTOMATED: CPT

## 2017-07-11 PROCEDURE — G0378 HOSPITAL OBSERVATION PER HR: HCPCS

## 2017-07-11 PROCEDURE — 80053 COMPREHEN METABOLIC PANEL: CPT

## 2017-07-11 PROCEDURE — 70450 CT HEAD/BRAIN W/O DYE: CPT

## 2017-07-11 PROCEDURE — 87205 SMEAR GRAM STAIN: CPT

## 2017-07-11 PROCEDURE — 87040 BLOOD CULTURE FOR BACTERIA: CPT

## 2017-07-11 PROCEDURE — 72170 X-RAY EXAM OF PELVIS: CPT

## 2017-07-11 NOTE — RAD
CT HEAD

 

PQRS STATEMENT:

One or more of the following in the visualized dose reduction techniques 

were utilized for this study: 1. Automatic exposure control, 2. Adjustment

of the mA and/or kV according to patient size, 3. Use of iterative 

reconstruction technique 

 

INDICATION: altered mental status<BR>hx of seizure

 

COMPARISON: July 30, 2016

 

TECHNIQUE: 5 mm contiguous axial images were obtained from the skull base 

to the vertex in both bone and soft tissue algorithm.  

 

 

FINDINGS: 

No abnormal attenuation within the brain parenchyma.  

 

No evidence of intracranial hemorrhage.   No extra-axial fluid 

collections.

 

No mass effect or midline shift. Ventricular size is appropriate.  Basal 

cisterns are patent.

 

No fractures identified.Gray-white differentiation is preserved.Globes and

orbits are within normal limits.   Paranasal sinuses and mastoid air cells

are clear.   

 

IMPRESSION:

No acute intracranial abnormality. 

 

Electronically signed by: Marco Antonio Sparrow MD (7/11/2017 8:44 PM) [facility]

## 2017-07-11 NOTE — PHYS DOC
Past Medical History


Past Medical History:  Anxiety, COPD, Diabetes-Type II, GERD, Pneumonia, Seizure

, Other


Additional Past Medical Histor:  Bronchitis, RSD, back pain


Past Surgical History:  Other


Additional Past Surgical Histo:  PAIN PUMP, CYSTS REMOVED


Alcohol Use:  Rarely


Drug Use:  None





Adult General


Chief Complaint


Chief Complaint:  ALTERED MENTAL STATUS





HPI


HPI





57-year-old male with a history of chronic debilitation COPD chronic renal 

failure 4 L home oxygen prior hyponatremia and opiate abuse now brought in by 

EMS for evaluation after being found down some and was unable to reach the 

patient's old wellness check was done and he was found down on the floor 

confused and very weak. She has now communicative and appropriate but is 

profoundly weak beyond his baseline. Patient states he has not been drinking 

very much in the way of fluids and is not sure how he ended up on the floor. He 

has no specific complaints denies chest pain or shortness of breath. Reports 

normal bowel and bladder habits. Patient states she's been under an undue 

amount of stress recently as his loved one is admitted to the hospital.





Review of Systems


Review of Systems





Constitutional: Denies fever or chills []


Eyes: Denies change in visual acuity, redness, or eye pain []


HENT: Denies nasal congestion or sore throat []


Respiratory: Denies cough or shortness of breath []


Cardiovascular: No additional information not addressed in HPI []


GI: Denies abdominal pain, nausea, vomiting, bloody stools or diarrhea []


: Denies dysuria or hematuria []


Musculoskeletal: Denies back pain or joint pain []


Integument: Denies rash or skin lesions []


Neurologic: Denies headache, focal weakness or sensory changes []


Endocrine: Denies polyuria or polydipsia []





Current Medications


Current Medications





Current Medications








 Medications


  (Trade)  Dose


 Ordered  Sig/Garrick  Start Time


 Stop Time Status Last Admin


Dose Admin


 


 Sodium Chloride  1,000 ml @ 


 1,000 mls/hr  1X  ONCE  7/11/17 20:15


 7/11/17 21:14   


 











Allergies


Allergies





Allergies








Coded Allergies Type Severity Reaction Last Updated Verified


 


  midazolam Allergy Severe Anaphylaxis 3/16/17 Yes


 


  paroxetine Allergy Severe seizures 3/16/17 Yes


 


  I S O L A T I O N *CONTACT* Allergy Unknown  3/16/17 Yes











Physical Exam


Physical Exam


Cachectic appearing male, appears older than his stated age. Desiccated mucous 

membranes. Patient is resting with his eyes closed but responds appropriately 

to verbal stimuli. He is alert and communicative. Nonfocal neurologically. 

Clear lungs regular rate and rhythm benign abdomen emaciated extremities with 

generalized weakness 4 minus out of 5 with no asymmetry and no cranial nerve 

deficit


Constitutional: As above, no acute distress, non-toxic appearance. []


HENT: Normocephalic, atraumatic, bilateral external ears normal, oropharynx is, 

no oral exudates, nose normal. []


Eyes: PERRLA, EOMI, conjunctiva normal, no discharge. [] 


Neck: Normal range of motion, no tenderness, supple, no stridor. [] 


Cardiovascular:Heart rate regular rhythm, no murmur []


Lungs & Thorax:  Bilateral breath sounds clear to auscultation []


Abdomen: Bowel sounds normal, soft, no tenderness, no masses, no pulsatile 

masses. [] 


Skin: Warm, dry, no erythema, no rash. [] 


Back: No tenderness, no CVA tenderness. [] 


Extremities: No tenderness, no cyanosis, no clubbing, ROM intact, no edema. [] 


Neurologic: Alert and oriented X2 , mild confusion, normal motor function, 

normal sensory function, no focal deficits noted. []


Psychologic: Depressed mood and flat affect





Current Patient Data


Vital Signs





 Vital Signs








  Date Time  Temp Pulse Resp B/P (MAP) Pulse Ox O2 Delivery O2 Flow Rate FiO2


 


7/11/17 19:39 100.9       





 100.9       


 


7/11/17 19:27  110 16 140/68 (92) 90 Nasal Cannula 6.0 








Lab Values





 Laboratory Tests








Test


  7/11/17


19:34 7/11/17


19:57


 


White Blood Count


  8.8 x10^3/uL


(4.0-11.0) 


 


 


Red Blood Count


  5.04 x10^6/uL


(4.30-5.70) 


 


 


Hemoglobin


  15.0 g/dL


(13.0-17.5) 


 


 


Hematocrit


  46.0 %


(39.0-53.0) 


 


 


Mean Corpuscular Volume


  91 fL ()


  


 


 


Mean Corpuscular Hemoglobin 30 pg (25-35)   


 


Mean Corpuscular Hemoglobin


Concent 33 g/dL


(31-37) 


 


 


Red Cell Distribution Width


  15.5 %


(11.5-14.5)  H 


 


 


Platelet Count


  226 x10^3/uL


(140-400) 


 


 


Neutrophils (%) (Auto) 85 % (31-73)  H 


 


Lymphocytes (%) (Auto) 9 % (24-48)  L 


 


Monocytes (%) (Auto) 5 % (0-9)   


 


Eosinophils (%) (Auto) 0 % (0-3)   


 


Basophils (%) (Auto) 1 % (0-3)   


 


Neutrophils # (Auto)


  7.5 x10^3uL


(1.8-7.7) 


 


 


Lymphocytes # (Auto)


  0.8 x10^3/uL


(1.0-4.8)  L 


 


 


Monocytes # (Auto)


  0.4 x10^3/uL


(0.0-1.1) 


 


 


Eosinophils # (Auto)


  0.0 x10^3/uL


(0.0-0.7) 


 


 


Basophils # (Auto)


  0.0 x10^3/uL


(0.0-0.2) 


 


 


Segmented Neutrophils % 79 % (35-66)  H 


 


Band Neutrophils % 6 % (0-9)   


 


Lymphocytes % 13 % (24-48)  L 


 


Monocytes % 2 % (0-10)   


 


Platelet Estimate


  Adequate


(ADEQUATE) 


 


 


Sodium Level


  142 mmol/L


(136-145) 


 


 


Potassium Level


  5.5 mmol/L


(3.5-5.1)  H 


 


 


Chloride Level


  99 mmol/L


() 


 


 


Carbon Dioxide Level


  36 mmol/L


(21-32)  H 


 


 


Anion Gap 7 (6-14)   


 


Blood Urea Nitrogen


  13 mg/dL


(8-26) 


 


 


Creatinine


  1.7 mg/dL


(0.7-1.3)  H 


 


 


Estimated GFR


(Cockcroft-Gault) 41.5  


  


 


 


BUN/Creatinine Ratio 8 (6-20)   


 


Glucose Level


  146 mg/dL


(70-99)  H 


 


 


Calcium Level


  8.8 mg/dL


(8.5-10.1) 


 


 


Total Bilirubin


  0.3 mg/dL


(0.2-1.0) 


 


 


Aspartate Amino Transferase


(AST) 21 U/L (15-37)


  


 


 


Alanine Aminotransferase (ALT)


  19 U/L (16-63)


  


 


 


Alkaline Phosphatase


  120 U/L


()  H 


 


 


Creatine Kinase


  180 U/L


() 


 


 


Troponin I Quantitative


  0.042 ng/mL


(0.000-0.055) 


 


 


Total Protein


  7.4 g/dL


(6.4-8.2) 


 


 


Albumin


  3.7 g/dL


(3.4-5.0) 


 


 


Albumin/Globulin Ratio 1.0 (1.0-1.7)   


 


Urine Collection Type  U cath  


 


Urine Color  Yellow  


 


Urine Clarity  Clear  


 


Urine pH  5.5  


 


Urine Specific Gravity  1.015  


 


Urine Protein


  


  Negative mg/dL


(NEG-TRACE)


 


Urine Glucose (UA)


  


  Negative mg/dL


(NEG)


 


Urine Ketones (Stick)


  


  Negative mg/dL


(NEG)


 


Urine Blood


  


  Negative (NEG)


 


 


Urine Nitrite


  


  Negative (NEG)


 


 


Urine Bilirubin


  


  Negative (NEG)


 


 


Urine Urobilinogen Dipstick


  


  0.2 mg/dL (0.2


mg/dL)


 


Urine Leukocyte Esterase


  


  Negative (NEG)


 


 


Urine RBC


  


  Rare /HPF


(0-2)


 


Urine WBC  0 /HPF (0-4)  


 


Urine Squamous Epithelial


Cells 


  Occ /LPF  


 


 


Urine Transitional Epithelial


Cells 


  Occ /LPF  


 


 


Urine Bacteria


  


  0 /HPF (0-FEW)


 


 


Urine Hyaline Casts  Few /HPF  


 


Urine Mucus  Slight /LPF  





 Laboratory Tests


7/11/17 19:34








 Laboratory Tests


7/11/17 19:34














EKG


EKG


Normal sinus tachycardia at 108 normal axis no STEMI nonspecific T-wave 

findings interpreted by me []





Radiology/Procedures


Radiology/Procedures


Hyperinflation chronic changes no acute disease interpreted by me []





Course & Med Decision Making


Course & Med Decision Making


Pertinent Labs and Imaging studies reviewed. (See chart for details)


Chronically debilitated 57-year-old male with a history chronic pain with a 

narcotic pain pump in place now status post episode of altered mental status 

with clinical findings consistent with dehydration. On arrival in emergency 

department patient is alert and communicative however he is profoundly weak 

appearing. He has nonfocal neurologic exam. CT head is unremarkable. Chest x-

ray and EKG benign. Hydration initiated. Patient has mild acute renal injury 

with creatinine 1.7 which is new for him. Potassium is 5.5 but EKG is 

unremarkable as mentioned." Workup including CPK benign. Prior history of 

severe hyponatremia however patient's sodium here 142. Hydration is in. Case 

discussed with patient's primary care doctor Marky Shelton is aware of 

history and findings of agrees with observation admission for continued 

hydration and observation after profoundly altered mental status.


[]





Dragon Disclaimer


Dragon Disclaimer


This electronic medical record was generated, in whole or in part, using a 

voice recognition dictation system.





Departure


Departure


Impression:  


 Primary Impression:  


 Altered mental status


 Additional Impressions:  


 Dehydration


 Acute renal injury


 Hyperkalemia


Disposition:  09 ADMITTED AS INPATIENT


Admitting Physician:  Marky Shelton


Condition:  STABLE


Referrals:  


MARKY SHELTON MD (PCP)





Problem Qualifiers











MARIANO DEVLIN MD Jul 11, 2017 20:25

## 2017-07-12 VITALS — DIASTOLIC BLOOD PRESSURE: 65 MMHG | SYSTOLIC BLOOD PRESSURE: 109 MMHG

## 2017-07-12 VITALS
DIASTOLIC BLOOD PRESSURE: 69 MMHG | SYSTOLIC BLOOD PRESSURE: 115 MMHG | SYSTOLIC BLOOD PRESSURE: 115 MMHG | SYSTOLIC BLOOD PRESSURE: 115 MMHG | DIASTOLIC BLOOD PRESSURE: 69 MMHG | DIASTOLIC BLOOD PRESSURE: 69 MMHG

## 2017-07-12 VITALS — DIASTOLIC BLOOD PRESSURE: 72 MMHG | SYSTOLIC BLOOD PRESSURE: 100 MMHG

## 2017-07-12 RX ADMIN — ALBUTEROL SULFATE SCH MG: 108 AEROSOL, METERED RESPIRATORY (INHALATION) at 11:35

## 2017-07-12 RX ADMIN — ALBUTEROL SULFATE SCH MG: 108 AEROSOL, METERED RESPIRATORY (INHALATION) at 07:29

## 2017-07-12 NOTE — RAD
AP pelvis, 7/11/2017:



History: Fall, pain



The bony structures are demineralized. No pelvic fracture is evident. The hip

joints are well-maintained. An electronic device overlies the left upper

pelvis laterally with a lead extending into the lumbar spine region.



IMPRESSION:

1. Demineralization.

2. No acute bony abnormality is detected.

## 2017-07-12 NOTE — DISCH
DISCHARGE INSTRUCTIONS


Condition on Discharge


Condition on Discharge:  Stable





Activity After Discharge


Activity Instructions for Disc:  No restrictions





Diet after Discharge


Diet after Discharge:  Regular





Follow-Up


Follow up with:  MONIKA Major MD Jul 12, 2017 08:48

## 2017-07-12 NOTE — EKG
[facility]

               [address] [place] [address]

Test Date:    2017               Test Time:    19:36:48

Pat Name:     ZACHARY LENZ          Department:   

Patient ID:   [id number]           Room:         [facility]

Gender:       M                        Technician:   SC

:          1960               Requested By: MARIANO DEVLIN

Order Number: [id number]            Reading MD:   Mandie Logan

                                 Measurements

Intervals                              Axis          

Rate:         108                      P:            -135

TN:           104                      QRS:          82

QRSD:         76                       T:            27

QT:           334                                    

QTc:          451                                    

                           Interpretive Statements

SINUS TACHYCARDIA

T ABNORMALITY IN ANTEROSEPTAL LEADS

ABNORMAL ECG





Electronically Signed On 2017 15:09:01 CDT by Mandie Logan

## 2017-07-12 NOTE — PDOC3
DATE OF ADMISSION


Date of Admission


7/11/17





DATE OF DISCHARGE


Discharge Date


7/12/17





LABS


Labs





Laboratory Tests








Test


  7/11/17


19:34 7/11/17


19:53 7/11/17


19:57 7/11/17


20:00


 


White Blood Count


  8.8 x10^3/uL


(4.0-11.0) 


  


  


 


 


Red Blood Count


  5.04 x10^6/uL


(4.30-5.70) 


  


  


 


 


Hemoglobin


  15.0 g/dL


(13.0-17.5) 


  


  


 


 


Hematocrit


  46.0 %


(39.0-53.0) 


  


  


 


 


Mean Corpuscular Volume 91 fL ()    


 


Mean Corpuscular Hemoglobin 30 pg (25-35)    


 


Mean Corpuscular Hemoglobin


Concent 33 g/dL


(31-37) 


  


  


 


 


Red Cell Distribution Width


  15.5 %


(11.5-14.5) 


  


  


 


 


Platelet Count


  226 x10^3/uL


(140-400) 


  


  


 


 


Neutrophils (%) (Auto) 85 % (31-73)    


 


Lymphocytes (%) (Auto) 9 % (24-48)    


 


Monocytes (%) (Auto) 5 % (0-9)    


 


Eosinophils (%) (Auto) 0 % (0-3)    


 


Basophils (%) (Auto) 1 % (0-3)    


 


Neutrophils # (Auto)


  7.5 x10^3uL


(1.8-7.7) 


  


  


 


 


Lymphocytes # (Auto)


  0.8 x10^3/uL


(1.0-4.8) 


  


  


 


 


Monocytes # (Auto)


  0.4 x10^3/uL


(0.0-1.1) 


  


  


 


 


Eosinophils # (Auto)


  0.0 x10^3/uL


(0.0-0.7) 


  


  


 


 


Basophils # (Auto)


  0.0 x10^3/uL


(0.0-0.2) 


  


  


 


 


Segmented Neutrophils % 79 % (35-66)    


 


Band Neutrophils % 6 % (0-9)    


 


Lymphocytes % 13 % (24-48)    


 


Monocytes % 2 % (0-10)    


 


Platelet Estimate


  Adequate


(ADEQUATE) 


  


  


 


 


Sodium Level


  142 mmol/L


(136-145) 


  


  


 


 


Potassium Level


  5.5 mmol/L


(3.5-5.1) 


  


  


 


 


Chloride Level


  99 mmol/L


() 


  


  


 


 


Carbon Dioxide Level


  36 mmol/L


(21-32) 


  


  


 


 


Anion Gap 7 (6-14)    


 


Blood Urea Nitrogen


  13 mg/dL


(8-26) 


  


  


 


 


Creatinine


  1.7 mg/dL


(0.7-1.3) 


  


  


 


 


Estimated GFR


(Cockcroft-Gault) 41.5 


  


  


  


 


 


BUN/Creatinine Ratio 8 (6-20)    


 


Glucose Level


  146 mg/dL


(70-99) 


  


  


 


 


Calcium Level


  8.8 mg/dL


(8.5-10.1) 


  


  


 


 


Total Bilirubin


  0.3 mg/dL


(0.2-1.0) 


  


  


 


 


Aspartate Amino Transf


(AST/SGOT) 21 U/L (15-37) 


  


  


  


 


 


Alanine Aminotransferase


(ALT/SGPT) 19 U/L (16-63) 


  


  


  


 


 


Alkaline Phosphatase


  120 U/L


() 


  


  


 


 


Creatine Kinase


  180 U/L


() 


  


  


 


 


Troponin I Quantitative


  0.042 ng/mL


(0.000-0.055) 


  


  


 


 


Total Protein


  7.4 g/dL


(6.4-8.2) 


  


  


 


 


Albumin


  3.7 g/dL


(3.4-5.0) 


  


  


 


 


Albumin/Globulin Ratio 1.0 (1.0-1.7)    


 


O2 Saturation  89 % (92-99)   


 


Arterial Blood pH


  


  7.43


(7.35-7.45) 


  


 


 


Arterial Blood pCO2 at


Patient Temp 


  49 mmHg


(35-46) 


  


 


 


Arterial Blood pO2 at Patient


Temp 


  63 mmHg


() 


  


 


 


Arterial Blood HCO3


  


  31 mmol/L


(21-28) 


  


 


 


Arterial Blood Base Excess


  


  6 mmol/L


(-3-3) 


  


 


 


FiO2  44%   


 


Urine Collection Type   U cath  


 


Urine Color   Yellow  


 


Urine Clarity   Clear  


 


Urine pH   5.5  


 


Urine Specific Gravity   1.015  


 


Urine Protein


  


  


  Negative mg/dL


(NEG-TRACE) 


 


 


Urine Glucose (UA)


  


  


  Negative mg/dL


(NEG) 


 


 


Urine Ketones (Stick)


  


  


  Negative mg/dL


(NEG) 


 


 


Urine Blood   Negative (NEG)  


 


Urine Nitrite   Negative (NEG)  


 


Urine Bilirubin   Negative (NEG)  


 


Urine Urobilinogen Dipstick


  


  


  0.2 mg/dL (0.2


mg/dL) 


 


 


Urine Leukocyte Esterase   Negative (NEG)  


 


Urine RBC


  


  


  Rare /HPF


(0-2) 


 


 


Urine WBC   0 /HPF (0-4)  


 


Urine Squamous Epithelial


Cells 


  


  Occ /LPF 


  


 


 


Urine Transitional Epithelial


Cells 


  


  Occ /LPF 


  


 


 


Urine Bacteria   0 /HPF (0-FEW)  


 


Urine Hyaline Casts   Few /HPF  


 


Urine Mucus   Slight /LPF  


 


Urine Opiates Screen   Pos (NEG)  


 


Urine Methadone Screen   Neg (NEG)  


 


Urine Barbiturates   Neg (NEG)  


 


Urine Phencyclidine Screen   Neg (NEG)  


 


Urine


Amphetamine/Methamphetamine 


  


  Neg (NEG) 


  


 


 


Urine Benzodiazepines Screen   Neg (NEG)  


 


Urine Cocaine Screen   Neg (NEG)  


 


Urine Cannabinoids Screen   Neg (NEG)  


 


Urine Ethyl Alcohol   Neg (NEG)  


 


Lactic Acid Level


  


  


  


  4.5 mmol/L


(0.4-2.0)











MEDICATIONS


Medications


Medications reviewed and reconciled for discharge.





ALLERGIES


Allergies





Allergies








Coded Allergies Type Severity Reaction Last Updated Verified


 


  midazolam Allergy Severe Anaphylaxis 3/16/17 Yes


 


  paroxetine Allergy Severe seizures 3/16/17 Yes


 


  I S O L A T I O N *CONTACT* Allergy Unknown  3/16/17 Yes











MEDICATIONS


Meds


Medications reviewed.





REVIEW OF SYSTEMS


ROS


A 14 point ROS was completed with the following noted as positive:





Other systems reviewed and negative.





PHYSICAL EXAM


Vital Signs





Vital Signs








  Date Time  Temp Pulse Resp B/P (MAP) Pulse Ox O2 Delivery O2 Flow Rate FiO2


 


7/12/17 07:29     92 Venturi Mask 9.0 


 


7/12/17 07:00        


 


7/12/17 03:00 97.9 79 20     





 97.9       








Assessment


heat exhaustion





[facility] NOTE


[facility] Note


came in from heat, all labs ok, MS cleared w/ fluids- doing well and wants to 

dc now- meds same, fu prn- ct head, cxr clear





FOLLOW UP


F/U


prn





DISPOSITION


Dispo


home,stable











MONIKA AHUMADA MD Jul 12, 2017 09:08

## 2017-07-12 NOTE — RAD
Portable chest, 7/11/2017:



History: Shortness of breath, chest pain



Comparison is made to a study from 3/17/2017. The heart size and pulmonary

vascularity are normal. There were scattered parenchymal scars. No acute

infiltrate is seen. There is no evidence of pleural fluid.



IMPRESSION: No acute cardiopulmonary abnormality is detected.

## 2017-07-12 NOTE — [facility]
Admission Forms Criteria


                                           MENTAL STATUS CHANGE





   Clinical Indications for Inpatient Care    


                                                                     


                                                                 (Place 'X' for 

any and all applicable criteria):  





Ongoing inpatient care may be needed for 1 or more of the following(1)(2)(3)(5)(

6): 





[X]I.    Suspected serious etiology (eg, medical disorder, CNS event) of 

altered mental status


[ ]II.   Danger to self or others not manageable at lower level of care


[ ]III.  Grave disability (eg, inability to perform self care necessary at 

lower level of care)


[ ]IV. Agitation or inappropriate behavior interfering with care for primary 

condition (eg, attempting to discontinue


        lines or drains prematurely, unable to cooperate with respiratory care)


[ ]V.  Delirium [A] [D][E] as described by 1 or more of the following(26):


         [ ]a)  Delirium due to alcohol or sedative [F] withdrawal


         [ ]b)  Delirium of uncertain etiology that has not responded to 

appropriate empiric treatment


         [ ]c)  Delirium that prevents performance of a life-sustaining 

function (eg, feeding or hydrating oneself)





[ ]VI.  General contraindications and/or Inappropriate clinical situations for 

Observational Care in patients


          with Mental Status Change, when ANY ONE of the following is required:


          [ ]a)   Prediction of prolongation of LOS based on ANY ONE of the 

following may be considered as 


                    a contraindication for observational care 2, 3, 4, 5, 6, 7, 

8, 9, 10, 11


                    [ ]i)    Age > 65 yrs.


                    [ ]ii)   Patient arriving by ambulance


                    [ ]iii)  Patient with high acuity


                    [ ]iv)  Patient requiring vital sign monitoring


                    [ ]v)   Patient on IV medication


          [ ]b)   Systolic blood pressures  greater than or equal to 180mmHg 3,

12


          [ ]c)   Patient with altered mental status including delirium and 

other alteration of consciousness, (3)


          [ ]d)   Patient whose discharge disposition will be to a skilled 

nursing home or rehabilitation home should 


                   not be managed in Emergency Department Observation Unit. CMS 

rule requires 3 days hospital stay before such placement.3,13


          [ ]e)   Patient with failure to thrive due to broad array of 

etiologies 3,16,17


          [ ]f)   Inability to ambulate 3,14








Extended stay beyond goal length of stay for the primary condition may be 

needed until ALL of the following are present(3)(5):


[ ]a)  Underlying medical etiology of mental status change is absent, or has 

been established and adequately treated


[ ]b)  Danger to self or others is absent or manageable at lower level of care.


[ ]c)  Behavior crisis management, including physical or chemical restraints, 

is not required or available at lower level of car


[ ]d)  Substance or alcohol withdrawal is absent or manageable at lower level 

of care.


[ ]e)  Behavioral symptoms (eg, agitation, somnolence, inappropriate behavior) 

are absent, or are manageable at lower level of care.








The original [facility] content created by [facility] has been revised. 


The portions of the content which have been revised are identified through the 

use of italic text or in bold, and [facility] 


has neither reviewed nor approved the modified material. All other unmodified 

content is copyright  [facility].





Please see references footnoted in the original [facility] edition 

2016


Admission Criteria Met?:  Yes











DHARMESH VERDE Jul 12, 2017 02:28

## 2017-08-22 ENCOUNTER — HOSPITAL ENCOUNTER (OUTPATIENT)
Dept: HOSPITAL 61 - PNCL | Age: 57
Discharge: HOME | End: 2017-08-22
Attending: ANESTHESIOLOGY
Payer: MEDICARE

## 2017-08-22 ENCOUNTER — HOSPITAL ENCOUNTER (EMERGENCY)
Dept: HOSPITAL 61 - ER | Age: 57
Discharge: LEFT BEFORE DISCHARGE | End: 2017-08-22
Payer: MEDICARE

## 2017-08-22 DIAGNOSIS — Z87.01: ICD-10-CM

## 2017-08-22 DIAGNOSIS — G89.4: Primary | ICD-10-CM

## 2017-08-22 DIAGNOSIS — R06.02: Primary | ICD-10-CM

## 2017-08-22 DIAGNOSIS — K21.9: ICD-10-CM

## 2017-08-22 DIAGNOSIS — M19.90: ICD-10-CM

## 2017-08-22 DIAGNOSIS — Z72.0: ICD-10-CM

## 2017-08-22 DIAGNOSIS — E11.9: ICD-10-CM

## 2017-08-22 DIAGNOSIS — Z86.69: ICD-10-CM

## 2017-08-22 DIAGNOSIS — J44.9: ICD-10-CM

## 2017-08-22 DIAGNOSIS — Z86.14: ICD-10-CM

## 2017-08-22 DIAGNOSIS — Z87.39: ICD-10-CM

## 2017-08-22 DIAGNOSIS — Z88.8: ICD-10-CM

## 2017-08-22 DIAGNOSIS — Z53.21: ICD-10-CM

## 2017-08-22 DIAGNOSIS — F17.200: ICD-10-CM

## 2017-08-22 DIAGNOSIS — F41.9: ICD-10-CM

## 2017-08-22 DIAGNOSIS — Z86.718: ICD-10-CM

## 2017-08-22 DIAGNOSIS — J32.9: ICD-10-CM

## 2017-08-22 DIAGNOSIS — I10: ICD-10-CM

## 2017-08-22 PROCEDURE — 62370 ANL SP INF PMP W/MDREPRG&FIL: CPT

## 2017-08-22 PROCEDURE — 95991 SPIN/BRAIN PUMP REFIL & MAIN: CPT

## 2017-08-22 NOTE — PAIN
DATE OF SERVICE:  08/22/2017



DIAGNOSES:

1.  Chronic pain syndrome.

2.  Spasticity.

3.  Headaches with intrathecal pump therapy.



HISTORY OF PRESENT ILLNESS:  The patient is a 57-year-old male who returns for

followup status post intrathecal pump therapy for refill and reprogramming

today, last seen on 05/18/2017.  The patient reports he has been in and out of

the hospital several times for pulmonary issues and we discussed decreasing his

pump rate to some extent and he is willing to do this.  He is still having some

pain, but he reports it is fairly well controlled by about 75% overall without

significant side effects such as itching, nausea, sedation or loss of memory. 

The patient reports no constipation as well.  The patient reports he has muscle

spasms in his arms, chest, legs.  It is sharp, dull, shooting, cramping,

stabbing, can be severe, and rate anywhere from a 9 to scale of 10 to a 6 on a

scale of 10, currently is 6 on exam today.  The patient with morphine sulfate

intrathecal pump running at 6.99 mg per day and we will decrease this slightly

today as well.  The patient reports he does sleep well at night, but his pain

does awaken him from sleep occasionally with some cramping in his shoulders and

arms.  The patient continues to smoke.  He is without new motor or sensory

deficits or other complaints.  The patient reports the pump is comfortable, and

again, no side effects that he is aware of.



PAST MEDICAL HISTORY:  Significant for chronic pain syndrome, tobacco use

disorder, phlebitis, diabetes, gastroesophageal reflux disease, chronic

obstructive pulmonary disease, spasticity, chronic sinusitis.



ALLERGIES:  PAXIL AND VERSED.



CURRENT MEDICATIONS:  The patient's medication list was updated.  Current

medications include Ventolin inhaler, Norvasc, daily baby aspirin,

amitriptyline, baclofen, Tegretol, Klonopin, Advair, polyethylene glycol, Lasix,

lansoprazole, tamsulosin and levetiracetam, lap and intrathecal morphine.



REVIEW OF SYSTEMS:  The patient's review of systems is positive for those items

mentioned in the history of present illness.  Recent shortness of breath. 

Denies fevers, chills, nausea, vomiting, chest pain or palpitation.  No new

bowel or bladder incontinence, no new subjective motor weakness as well.



PHYSICAL EXAMINATION:

VITAL SIGNS:  Today, the patient's blood pressure is 115/77, pulse 119,

respirations 18, temperature 98.9 degrees Fahrenheit.  Height is 5 feet 7

inches, weighs 145 pounds.  O2 saturation resting at room air is 75%, with deep

breathing comes up to 88%.

GENERAL:  The patient is awake, alert, oriented, appropriate, is very pleasant

in demeanor.  The patient is accompanied by his spouse.

HEENT:  Shows normocephalic, atraumatic.  Some previous scarring from biopsies

noted.  Dentition is intact.

NECK:  Shows anterior throat is supple without palpable lymphadenopathy noted. 

Swallow reflex is symmetrical.

CHEST:  Shows normal on inspection.  Breath sounds are clear to auscultation

bilaterally, but deep and somewhat coarse and clear, but no rales or rhonchi or

wheezes are noticed.  Deep breath does elicit cough as well.

HEART:  Shows S1 and S2 clear.  No murmurs auscultated.

ABDOMEN:  Soft, nontender, nondistended.  Easily palpable intrathecal pump is

noted in the left lower quadrant, which is nontender and mobile.

BACK:  The patient's back shows spine grossly midline.  Slight exaggeration of

thoracic kyphosis, mild flattening of lumbar lordotic curvature.  Paraspinous

musculature shows some mild tenderness in the middle thoracic distribution as

well as the inferior thoracic; upper, middle and lower lumbar paraspinous, but

only to a diffuse degree without radiation.

EXTREMITIES:  Upper extremity deep tendon reflexes 2+, lower extremities are 1+

in the patellar tendons.  Motor exam is strong with  strength rated at

approximately 4 on a scale of 5, but is equal and symmetrical.  Peripheral

pulses are 1+ posterior tibial and dorsalis pedis and radial distribution. 

Lower extremity motor strength shows about a 4/5, but equal with dorsiflexion,

extension, quadriceps and hamstring flexion.  The patient is walking with the

help of a walker, is not wearing any oxygen on his visit today.



Options were discussed with the patient and the patient's old chart was reviewed

as is his current medication regimen and updated as noted.  His review of

systems is updated as noted as well.  We will proceed with refill and

reprogramming of the patient's intrathecal pump.  Under sterile prep and drape,

using local anesthetic topical, with a 22-gauge non-cutting [facility] [employer] kit needle

was entered into the intrathecal pump without difficulty.  A 3 mL of old

medication was withdrawn and discarded and 20 mL of the new medication

containing 50 mg per mL of morphine was replaced without difficulty.  Needle was

removed.  Sterile bandage was applied.  The pump was then reprogrammed as to

volume as well as rate decreased by 4% to 6.696 mg per day.  The patient

tolerated the procedure well and had no complications.  He will return to the

clinic prior to 01/03/2018 for refill date.

 



______________________________

AGUSTÍN DIEZ MD



DR:  FÉLIX/nts  JOB#:  [id number] / [id number]

DD:  08/22/2017 12:48  DT:  08/22/2017 16:28

## 2017-10-09 ENCOUNTER — HOSPITAL ENCOUNTER (INPATIENT)
Dept: HOSPITAL 61 - ER | Age: 57
LOS: 2 days | Discharge: HOME | DRG: 189 | End: 2017-10-11
Attending: FAMILY MEDICINE | Admitting: FAMILY MEDICINE
Payer: MEDICARE

## 2017-10-09 VITALS — BODY MASS INDEX: 22.21 KG/M2 | WEIGHT: 141.5 LBS | HEIGHT: 67 IN

## 2017-10-09 DIAGNOSIS — Z88.8: ICD-10-CM

## 2017-10-09 DIAGNOSIS — I10: ICD-10-CM

## 2017-10-09 DIAGNOSIS — E11.9: ICD-10-CM

## 2017-10-09 DIAGNOSIS — K21.9: ICD-10-CM

## 2017-10-09 DIAGNOSIS — J96.21: Primary | ICD-10-CM

## 2017-10-09 DIAGNOSIS — J96.22: ICD-10-CM

## 2017-10-09 DIAGNOSIS — F41.9: ICD-10-CM

## 2017-10-09 DIAGNOSIS — Z99.81: ICD-10-CM

## 2017-10-09 DIAGNOSIS — Z82.49: ICD-10-CM

## 2017-10-09 DIAGNOSIS — Z82.3: ICD-10-CM

## 2017-10-09 DIAGNOSIS — F17.200: ICD-10-CM

## 2017-10-09 DIAGNOSIS — J44.1: ICD-10-CM

## 2017-10-09 DIAGNOSIS — N40.0: ICD-10-CM

## 2017-10-09 PROCEDURE — 90686 IIV4 VACC NO PRSV 0.5 ML IM: CPT

## 2017-10-09 PROCEDURE — 87641 MR-STAPH DNA AMP PROBE: CPT

## 2017-10-09 PROCEDURE — 80048 BASIC METABOLIC PNL TOTAL CA: CPT

## 2017-10-09 PROCEDURE — 80053 COMPREHEN METABOLIC PANEL: CPT

## 2017-10-09 PROCEDURE — 84484 ASSAY OF TROPONIN QUANT: CPT

## 2017-10-09 PROCEDURE — 93005 ELECTROCARDIOGRAM TRACING: CPT

## 2017-10-09 PROCEDURE — 94760 N-INVAS EAR/PLS OXIMETRY 1: CPT

## 2017-10-09 PROCEDURE — 94640 AIRWAY INHALATION TREATMENT: CPT

## 2017-10-09 PROCEDURE — 94660 CPAP INITIATION&MGMT: CPT

## 2017-10-09 PROCEDURE — 85610 PROTHROMBIN TIME: CPT

## 2017-10-09 PROCEDURE — 82805 BLOOD GASES W/O2 SATURATION: CPT

## 2017-10-09 PROCEDURE — 36415 COLL VENOUS BLD VENIPUNCTURE: CPT

## 2017-10-09 PROCEDURE — 82553 CREATINE MB FRACTION: CPT

## 2017-10-09 PROCEDURE — 36600 WITHDRAWAL OF ARTERIAL BLOOD: CPT

## 2017-10-09 PROCEDURE — 83735 ASSAY OF MAGNESIUM: CPT

## 2017-10-09 PROCEDURE — 71010: CPT

## 2017-10-09 PROCEDURE — 96374 THER/PROPH/DIAG INJ IV PUSH: CPT

## 2017-10-09 PROCEDURE — 83880 ASSAY OF NATRIURETIC PEPTIDE: CPT

## 2017-10-09 PROCEDURE — 82962 GLUCOSE BLOOD TEST: CPT

## 2017-10-09 PROCEDURE — 85025 COMPLETE CBC W/AUTO DIFF WBC: CPT

## 2017-10-09 PROCEDURE — 82550 ASSAY OF CK (CPK): CPT

## 2017-10-10 VITALS — SYSTOLIC BLOOD PRESSURE: 113 MMHG | DIASTOLIC BLOOD PRESSURE: 65 MMHG

## 2017-10-10 VITALS — DIASTOLIC BLOOD PRESSURE: 64 MMHG | SYSTOLIC BLOOD PRESSURE: 119 MMHG

## 2017-10-10 VITALS — SYSTOLIC BLOOD PRESSURE: 115 MMHG | DIASTOLIC BLOOD PRESSURE: 68 MMHG

## 2017-10-10 VITALS — SYSTOLIC BLOOD PRESSURE: 117 MMHG | DIASTOLIC BLOOD PRESSURE: 68 MMHG

## 2017-10-10 VITALS — DIASTOLIC BLOOD PRESSURE: 67 MMHG | SYSTOLIC BLOOD PRESSURE: 106 MMHG

## 2017-10-10 VITALS — SYSTOLIC BLOOD PRESSURE: 117 MMHG | DIASTOLIC BLOOD PRESSURE: 71 MMHG

## 2017-10-10 LAB
ALBUMIN SERPL-MCNC: 3.2 G/DL (ref 3.4–5)
ALBUMIN/GLOB SERPL: 0.7 {RATIO} (ref 1–1.7)
ALP SERPL-CCNC: 100 U/L (ref 46–116)
ALT SERPL-CCNC: 17 U/L (ref 16–63)
ANION GAP SERPL CALC-SCNC: 4 MMOL/L (ref 6–14)
AST SERPL-CCNC: 22 U/L (ref 15–37)
BASOPHILS # BLD AUTO: 0 X10^3/UL (ref 0–0.2)
BASOPHILS NFR BLD: 1 % (ref 0–3)
BILIRUB SERPL-MCNC: 0.2 MG/DL (ref 0.2–1)
BUN SERPL-MCNC: 10 MG/DL (ref 8–26)
BUN/CREAT SERPL: 10 (ref 6–20)
CALCIUM SERPL-MCNC: 8.8 MG/DL (ref 8.5–10.1)
CHLORIDE SERPL-SCNC: 94 MMOL/L (ref 98–107)
CK SERPL-CCNC: 146 U/L (ref 39–308)
CK SERPL-CCNC: 151 U/L (ref 39–308)
CKMB MASS: 2.6 NG/ML (ref 0–3.6)
CO2 SERPL-SCNC: 39 MMOL/L (ref 21–32)
CREAT SERPL-MCNC: 1 MG/DL (ref 0.7–1.3)
EOSINOPHIL NFR BLD: 1 % (ref 0–3)
ERYTHROCYTE [DISTWIDTH] IN BLOOD BY AUTOMATED COUNT: 16 % (ref 11.5–14.5)
GFR SERPLBLD BASED ON 1.73 SQ M-ARVRAT: 76.5 ML/MIN
GLOBULIN SER-MCNC: 4.3 G/DL (ref 2.2–3.8)
GLUCOSE SERPL-MCNC: 180 MG/DL (ref 70–99)
HCO3 BLDA-SCNC: 36 MMOL/L (ref 21–28)
HCO3 BLDA-SCNC: 38 MMOL/L (ref 21–28)
HCT VFR BLD CALC: 43.1 % (ref 39–53)
HGB BLD-MCNC: 14.1 G/DL (ref 13–17.5)
INR PPP: 1.1 (ref 0.8–1.1)
INSPIRATION SETTING TIME VENT: 40
INSPIRATION SETTING TIME VENT: 40
LYMPHOCYTES # BLD: 0.7 X10^3/UL (ref 1–4.8)
LYMPHOCYTES NFR BLD AUTO: 12 % (ref 24–48)
MAGNESIUM SERPL-MCNC: 1.4 MG/DL (ref 1.8–2.4)
MCH RBC QN AUTO: 29 PG (ref 25–35)
MCHC RBC AUTO-ENTMCNC: 33 G/DL (ref 31–37)
MCV RBC AUTO: 89 FL (ref 79–100)
MONOCYTES NFR BLD: 8 % (ref 0–9)
NEUTROPHILS NFR BLD AUTO: 80 % (ref 31–73)
PCO2 BLDA: 60 MMHG (ref 35–46)
PCO2 BLDA: 64 MMHG (ref 35–46)
PH ABG: 7.4 (ref 7.35–7.45)
PH ABG: 7.4 (ref 7.35–7.45)
PLATELET # BLD AUTO: 190 X10^3/UL (ref 140–400)
PO2 BLDA: 58 MMHG (ref 65–108)
PO2 BLDA: 60 MMHG (ref 65–108)
POTASSIUM SERPL-SCNC: 5 MMOL/L (ref 3.5–5.1)
PROT SERPL-MCNC: 7.5 G/DL (ref 6.4–8.2)
PROTHROMBIN TIME: 13.5 SEC (ref 11.7–14)
RBC # BLD AUTO: 4.83 X10^6/UL (ref 4.3–5.7)
SAO2 % BLDA: 89 % (ref 92–99)
SAO2 % BLDA: 89 % (ref 92–99)
SODIUM SERPL-SCNC: 137 MMOL/L (ref 136–145)
WBC # BLD AUTO: 6.1 X10^3/UL (ref 4–11)

## 2017-10-10 PROCEDURE — 5A09357 ASSISTANCE WITH RESPIRATORY VENTILATION, LESS THAN 24 CONSECUTIVE HOURS, CONTINUOUS POSITIVE AIRWAY PRESSURE: ICD-10-PCS | Performed by: FAMILY MEDICINE

## 2017-10-10 RX ADMIN — TAMSULOSIN HYDROCHLORIDE SCH MG: 0.4 CAPSULE ORAL at 09:00

## 2017-10-10 RX ADMIN — IPRATROPIUM BROMIDE AND ALBUTEROL SULFATE SCH ML: .5; 3 SOLUTION RESPIRATORY (INHALATION) at 11:29

## 2017-10-10 RX ADMIN — BACLOFEN SCH MG: 10 TABLET ORAL at 09:00

## 2017-10-10 RX ADMIN — LIDOCAINE SCH PATCH: 50 PATCH CUTANEOUS at 11:45

## 2017-10-10 RX ADMIN — IPRATROPIUM BROMIDE AND ALBUTEROL SULFATE SCH ML: .5; 3 SOLUTION RESPIRATORY (INHALATION) at 15:35

## 2017-10-10 RX ADMIN — ASPIRIN 81 MG SCH MG: 81 TABLET ORAL at 09:00

## 2017-10-10 RX ADMIN — PANTOPRAZOLE SODIUM SCH MG: 40 TABLET, DELAYED RELEASE ORAL at 09:00

## 2017-10-10 RX ADMIN — BUDESONIDE SCH MG: 0.5 INHALANT RESPIRATORY (INHALATION) at 11:29

## 2017-10-10 RX ADMIN — IPRATROPIUM BROMIDE AND ALBUTEROL SULFATE SCH ML: .5; 3 SOLUTION RESPIRATORY (INHALATION) at 19:56

## 2017-10-10 RX ADMIN — BACLOFEN SCH MG: 10 TABLET ORAL at 21:21

## 2017-10-10 RX ADMIN — FUROSEMIDE SCH MG: 20 TABLET ORAL at 14:38

## 2017-10-10 RX ADMIN — BUDESONIDE SCH MG: 0.5 INHALANT RESPIRATORY (INHALATION) at 19:56

## 2017-10-10 RX ADMIN — FUROSEMIDE SCH MG: 20 TABLET ORAL at 09:00

## 2017-10-10 NOTE — PDOC
PULMONARY PROGRESS NOTES


Vitals





Vital Signs








  Date Time  Temp Pulse Resp B/P (MAP) Pulse Ox O2 Delivery O2 Flow Rate FiO2


 


10/10/17 15:35     95 Nasal Cannula 3.0 


 


10/10/17 14:50 97.9 16 18 115/68 (84)    





 97.9       








General:  Alert


HEENT:  Other


Lungs:  Clear


Cardiovascular:  S1, S2


Abdomen:  Soft, Non-tender


Extremities:  No Edema


Labs





Laboratory Tests








Test


  10/9/17


23:57 10/10/17


01:09 10/10/17


07:01 10/10/17


10:23


 


White Blood Count


  6.1 x10^3/uL


(4.0-11.0) 


  


  


 


 


Red Blood Count


  4.83 x10^6/uL


(4.30-5.70) 


  


  


 


 


Hemoglobin


  14.1 g/dL


(13.0-17.5) 


  


  


 


 


Hematocrit


  43.1 %


(39.0-53.0) 


  


  


 


 


Mean Corpuscular Volume 89 fL ()    


 


Mean Corpuscular Hemoglobin 29 pg (25-35)    


 


Mean Corpuscular Hemoglobin


Concent 33 g/dL


(31-37) 


  


  


 


 


Red Cell Distribution Width


  16.0 %


(11.5-14.5) 


  


  


 


 


Platelet Count


  190 x10^3/uL


(140-400) 


  


  


 


 


Neutrophils (%) (Auto) 80 % (31-73)    


 


Lymphocytes (%) (Auto) 12 % (24-48)    


 


Monocytes (%) (Auto) 8 % (0-9)    


 


Eosinophils (%) (Auto) 1 % (0-3)    


 


Basophils (%) (Auto) 1 % (0-3)    


 


Neutrophils # (Auto)


  4.9 x10^3uL


(1.8-7.7) 


  


  


 


 


Lymphocytes # (Auto)


  0.7 x10^3/uL


(1.0-4.8) 


  


  


 


 


Monocytes # (Auto)


  0.5 x10^3/uL


(0.0-1.1) 


  


  


 


 


Eosinophils # (Auto)


  0.0 x10^3/uL


(0.0-0.7) 


  


  


 


 


Basophils # (Auto)


  0.0 x10^3/uL


(0.0-0.2) 


  


  


 


 


Prothrombin Time


  13.5 SEC


(11.7-14.0) 


  


  


 


 


Prothromb Time International


Ratio 1.1 (0.8-1.1) 


  


  


  


 


 


Sodium Level


  137 mmol/L


(136-145) 


  


  


 


 


Potassium Level


  5.0 mmol/L


(3.5-5.1) 


  


  


 


 


Chloride Level


  94 mmol/L


() 


  


  


 


 


Carbon Dioxide Level


  39 mmol/L


(21-32) 


  


  


 


 


Anion Gap 4 (6-14)    


 


Blood Urea Nitrogen


  10 mg/dL


(8-26) 


  


  


 


 


Creatinine


  1.0 mg/dL


(0.7-1.3) 


  


  


 


 


Estimated GFR


(Cockcroft-Gault) 76.5 


  


  


  


 


 


BUN/Creatinine Ratio 10 (6-20)    


 


Glucose Level


  180 mg/dL


(70-99) 


  


  


 


 


Calcium Level


  8.8 mg/dL


(8.5-10.1) 


  


  


 


 


Magnesium Level


  1.4 mg/dL


(1.8-2.4) 


  


  


 


 


Total Bilirubin


  0.2 mg/dL


(0.2-1.0) 


  


  


 


 


Aspartate Amino Transf


(AST/SGOT) 22 U/L (15-37) 


  


  


  


 


 


Alanine Aminotransferase


(ALT/SGPT) 17 U/L (16-63) 


  


  


  


 


 


Alkaline Phosphatase


  100 U/L


() 


  


  


 


 


Creatine Kinase


  146 U/L


() 


  


  


 


 


Creatine Kinase MB (Mass)


  2.6 ng/mL


(0.0-3.6) 


  


  


 


 


Creatine Kinase MB Relative


Index 1.7 % (0-4) 


  


  


  


 


 


Troponin I Quantitative


  < 0.017 ng/mL


(0.000-0.055) 


  


  


 


 


NT-Pro-B-Type Natriuretic


Peptide 454 pg/mL


(0-124) 


  


  


 


 


Total Protein


  7.5 g/dL


(6.4-8.2) 


  


  


 


 


Albumin


  3.2 g/dL


(3.4-5.0) 


  


  


 


 


Albumin/Globulin Ratio 0.7 (1.0-1.7)    


 


O2 Saturation  89 % (92-99)   


 


Arterial Blood pH


  


  7.40


(7.35-7.45) 


  


 


 


Arterial Blood pCO2 at


Patient Temp 


  60 mmHg


(35-46) 


  


 


 


Arterial Blood pO2 at Patient


Temp 


  58 mmHg


() 


  


 


 


Arterial Blood HCO3


  


  36 mmol/L


(21-28) 


  


 


 


Arterial Blood Base Excess


  


  9 mmol/L


(-3-3) 


  


 


 


FiO2  40.0   


 


Glucose (Fingerstick)


  


  


  202 mg/dL


(70-99) 193 mg/dL


(70-99)


 


Test


  10/10/17


12:45 10/10/17


13:00 10/10/17


16:17 


 


 


O2 Saturation 89 % (92-99)    


 


Arterial Blood pH


  7.40


(7.35-7.45) 


  


  


 


 


Arterial Blood pCO2 at


Patient Temp 64 mmHg


(35-46) 


  


  


 


 


Arterial Blood pO2 at Patient


Temp 60 mmHg


() 


  


  


 


 


Arterial Blood HCO3


  38 mmol/L


(21-28) 


  


  


 


 


Arterial Blood Base Excess


  11 mmol/L


(-3-3) 


  


  


 


 


FiO2 40    


 


Troponin I Quantitative


  


  < 0.017 ng/mL


(0.000-0.055) 


  


 


 


Glucose (Fingerstick)


  


  


  362 mg/dL


(70-99) 


 








Laboratory Tests








Test


  10/9/17


23:57 10/10/17


01:09 10/10/17


07:01 10/10/17


10:23


 


White Blood Count


  6.1 x10^3/uL


(4.0-11.0) 


  


  


 


 


Red Blood Count


  4.83 x10^6/uL


(4.30-5.70) 


  


  


 


 


Hemoglobin


  14.1 g/dL


(13.0-17.5) 


  


  


 


 


Hematocrit


  43.1 %


(39.0-53.0) 


  


  


 


 


Mean Corpuscular Volume 89 fL ()    


 


Mean Corpuscular Hemoglobin 29 pg (25-35)    


 


Mean Corpuscular Hemoglobin


Concent 33 g/dL


(31-37) 


  


  


 


 


Red Cell Distribution Width


  16.0 %


(11.5-14.5) 


  


  


 


 


Platelet Count


  190 x10^3/uL


(140-400) 


  


  


 


 


Neutrophils (%) (Auto) 80 % (31-73)    


 


Lymphocytes (%) (Auto) 12 % (24-48)    


 


Monocytes (%) (Auto) 8 % (0-9)    


 


Eosinophils (%) (Auto) 1 % (0-3)    


 


Basophils (%) (Auto) 1 % (0-3)    


 


Neutrophils # (Auto)


  4.9 x10^3uL


(1.8-7.7) 


  


  


 


 


Lymphocytes # (Auto)


  0.7 x10^3/uL


(1.0-4.8) 


  


  


 


 


Monocytes # (Auto)


  0.5 x10^3/uL


(0.0-1.1) 


  


  


 


 


Eosinophils # (Auto)


  0.0 x10^3/uL


(0.0-0.7) 


  


  


 


 


Basophils # (Auto)


  0.0 x10^3/uL


(0.0-0.2) 


  


  


 


 


Prothrombin Time


  13.5 SEC


(11.7-14.0) 


  


  


 


 


Prothromb Time International


Ratio 1.1 (0.8-1.1) 


  


  


  


 


 


Sodium Level


  137 mmol/L


(136-145) 


  


  


 


 


Potassium Level


  5.0 mmol/L


(3.5-5.1) 


  


  


 


 


Chloride Level


  94 mmol/L


() 


  


  


 


 


Carbon Dioxide Level


  39 mmol/L


(21-32) 


  


  


 


 


Anion Gap 4 (6-14)    


 


Blood Urea Nitrogen


  10 mg/dL


(8-26) 


  


  


 


 


Creatinine


  1.0 mg/dL


(0.7-1.3) 


  


  


 


 


Estimated GFR


(Cockcroft-Gault) 76.5 


  


  


  


 


 


BUN/Creatinine Ratio 10 (6-20)    


 


Glucose Level


  180 mg/dL


(70-99) 


  


  


 


 


Calcium Level


  8.8 mg/dL


(8.5-10.1) 


  


  


 


 


Magnesium Level


  1.4 mg/dL


(1.8-2.4) 


  


  


 


 


Total Bilirubin


  0.2 mg/dL


(0.2-1.0) 


  


  


 


 


Aspartate Amino Transf


(AST/SGOT) 22 U/L (15-37) 


  


  


  


 


 


Alanine Aminotransferase


(ALT/SGPT) 17 U/L (16-63) 


  


  


  


 


 


Alkaline Phosphatase


  100 U/L


() 


  


  


 


 


Creatine Kinase


  146 U/L


() 


  


  


 


 


Creatine Kinase MB (Mass)


  2.6 ng/mL


(0.0-3.6) 


  


  


 


 


Creatine Kinase MB Relative


Index 1.7 % (0-4) 


  


  


  


 


 


Troponin I Quantitative


  < 0.017 ng/mL


(0.000-0.055) 


  


  


 


 


NT-Pro-B-Type Natriuretic


Peptide 454 pg/mL


(0-124) 


  


  


 


 


Total Protein


  7.5 g/dL


(6.4-8.2) 


  


  


 


 


Albumin


  3.2 g/dL


(3.4-5.0) 


  


  


 


 


Albumin/Globulin Ratio 0.7 (1.0-1.7)    


 


O2 Saturation  89 % (92-99)   


 


Arterial Blood pH


  


  7.40


(7.35-7.45) 


  


 


 


Arterial Blood pCO2 at


Patient Temp 


  60 mmHg


(35-46) 


  


 


 


Arterial Blood pO2 at Patient


Temp 


  58 mmHg


() 


  


 


 


Arterial Blood HCO3


  


  36 mmol/L


(21-28) 


  


 


 


Arterial Blood Base Excess


  


  9 mmol/L


(-3-3) 


  


 


 


FiO2  40.0   


 


Glucose (Fingerstick)


  


  


  202 mg/dL


(70-99) 193 mg/dL


(70-99)


 


Test


  10/10/17


12:45 10/10/17


13:00 10/10/17


16:17 


 


 


O2 Saturation 89 % (92-99)    


 


Arterial Blood pH


  7.40


(7.35-7.45) 


  


  


 


 


Arterial Blood pCO2 at


Patient Temp 64 mmHg


(35-46) 


  


  


 


 


Arterial Blood pO2 at Patient


Temp 60 mmHg


() 


  


  


 


 


Arterial Blood HCO3


  38 mmol/L


(21-28) 


  


  


 


 


Arterial Blood Base Excess


  11 mmol/L


(-3-3) 


  


  


 


 


FiO2 40    


 


Troponin I Quantitative


  


  < 0.017 ng/mL


(0.000-0.055) 


  


 


 


Glucose (Fingerstick)


  


  


  362 mg/dL


(70-99) 


 








Medications





Active Scripts








 Medications  Dose


 Route/Sig


 Max Daily Dose Days Date Category


 


 Lidocaine 1 Each


 Adh..patch  1 Each


 TP DAILY


    8/26/17 Reported


 


 Omeprazole 40 Mg


 Capsule.dr  1 Cap


 PO DAILY


    8/26/17 Reported


 


 Amitriptyline Hcl


 25 Mg Tablet  1 Tab


 PO QHS


    8/26/17 Reported


 


 Albuterol Sulfate


 Neb Soln


  (Albuterol


 Sulfate) 2.5 Mg/3


 Ml Vial.neb  1 Vial


 NEB PRN Q6HRS PRN


    8/26/17 Reported


 


 Levetiracetam 500


 Mg Tablet  500 Mg


 PO BID


    8/16/16 Reported


 


 Flomax


  (Tamsulosin Hcl)


 0.4 Mg Cap.er.24h  0.4 Mg


 PO DAILY


    1/4/16 Reported


 


 Lasix


  (Furosemide) 20


 Mg Tablet  20 Mg


 PO BID


    9/28/15 Reported


 


 [Polyethylene


 Glycol 3350] 17


 GM Packet  17 Gm


 PO PRN DAILY PRN


    9/18/15 Rx


 


 Advair 250-50


 Diskus


  (Fluticasone/Salmeterol)


 1 Puff Puff  1 Puff


 IH BID


    9/18/15 Rx


 


 Klonopin


  (Clonazepam) 0.5


 Mg Tablet  0.5 Mg


 PO BID


    9/18/15 Rx


 


 [Baclofen] 10 MG


 Tablet  20 Mg


 PO BID


    9/18/15 Rx


 


 Children's


 Aspirin (Aspirin)


 81 Mg Tab.chew  81 Mg


 PO DAILY


    9/18/15 Rx


 


 Ventolin Hfa


 Inhaler


  (Albuterol


 Sulfate) 1 Puff


 Puff  2 Puff


 INH PRN Q4HRS PRN


    9/18/15 Rx











Impression


.


FULL NOTE DICTATED


A/C HYPERCAPNIA/HYPOXEMIC RESP FAILURE


ABNORMAL CT OF CHEST NODULE/ADENOPATHY 





AGREE WITH CURRENT RX


REPEAT CT OF CHEST IN NOV








THANKS











HORTENSIA GARCIA MD Oct 10, 2017 16:54
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
## 2017-10-10 NOTE — RAD
Indication difficulty breathing. Shortness of breath.



A single view of the chest was obtained and is compared to an examination

8/26/2017.



Heart size is at the upper limits of normal. There is no gross congestive

heart failure. Background chronic changes compatible with emphysema or

fibrosis are noted. There is no consolidated pneumonia. A significant change

compared to the prior exam is not seen.



IMPRESSION: Chronic changes. No acute finding. No significant change

## 2017-10-10 NOTE — PHYS DOC
Past Medical History


Past Medical History:  Anxiety, COPD, Diabetes-Type II, GERD, Pneumonia, Seizure

, Other


Additional Past Medical Histor:  Bronchitis, RSD, back pain


Past Surgical History:  Other


Additional Past Surgical Histo:  PAIN PUMP, CYSTS REMOVED


Alcohol Use:  Rarely


Drug Use:  None





Adult General


Chief Complaint


Chief Complaint:  DYSPNEA/RESPIRATOY DISTRESS





HPI


HPI





Patient is a 57  year old male who presents with shortness of air. He has COPD 

and is on home oxygen. However the power was out at home all day he's had no 

oxygen today. Significant other noted that his oxygen saturation dropped to the 

30s. He was becoming more dyspneic. She called the ambulance. When they arrived 

his oxygen saturations 40% on room air. He was placed on CPAP with immediate 

improvement. Mental status markedly improved as well. He does have a DNR form 

that accompanied him.


His wife apparently works upstairs at [facility] [employer] as an aide. She 

was able to arrive here as she had no vehicle to transport her.





Review of Systems


Review of Systems





UNABLE TO OBTAIN FROM PATIENT (ON BIPAP)





Current Medications


Current Medications





Current Medications








 Medications


  (Trade)  Dose


 Ordered  Sig/Garrick  Start Time


 Stop Time Status Last Admin


Dose Admin


 


 Albuterol/


 Ipratropium


  (Duoneb)  3 ml  1X  ONCE  10/10/17 00:15


 10/10/17 00:16 DC 10/10/17 00:10


3 ML


 


 Sodium Chloride  1,000 ml @ 


 100 mls/hr  Q10H  10/10/17 00:00


 10/10/17 09:59  10/10/17 00:36


100 MLS/HR











Allergies


Allergies





Allergies








Coded Allergies Type Severity Reaction Last Updated Verified


 


  midazolam Allergy Severe Anaphylaxis 3/16/17 Yes


 


  paroxetine Allergy Severe seizures 3/16/17 Yes


 


  I S O L A T I O N *CONTACT* Allergy Unknown  3/16/17 Yes











Physical Exam


Physical Exam





Constitutional: Well developed, well nourished, moderate respiratory distress 

on CPAP, non-toxic appearance. 


HENT: Normocephalic, atraumatic, bilateral external ears normal, nose normal. 


Eyes: PERRLA, EOMI, conjunctiva normal, no discharge.  


Neck: Normal range of motion, no tenderness, supple, no stridor.  


Cardiovascular:HTachycardic heart rate regular rhythm, no murmur 


Lungs & Thorax:  Bilateral breath sounds coarse bilaterally; expiratory 

wheezing.


Abdomen: Bowel sounds normal, soft, no tenderness, no masses, no pulsatile 

masses.  


Skin: Warm, dry, no erythema, no rash.  


Back: No tenderness, no CVA tenderness.  


Extremities: No tenderness, no cyanosis, no clubbing, ROM intact, no edema.  


Neurologic: Alert and oriented X 3, normal motor function, normal sensory 

function, no focal deficits noted.





Current Patient Data


Vital Signs





 Vital Signs








  Date Time  Temp Pulse Resp B/P (MAP) Pulse Ox O2 Delivery O2 Flow Rate FiO2


 


10/10/17 00:54  102 16 103/62 (76) 97 BiPAP/CPAP  


 


10/9/17 23:53 98.6       





 98.6       








Lab Values





 Laboratory Tests








Test


  10/9/17


23:57


 


White Blood Count


  6.1 x10^3/uL


(4.0-11.0)


 


Red Blood Count


  4.83 x10^6/uL


(4.30-5.70)


 


Hemoglobin


  14.1 g/dL


(13.0-17.5)


 


Hematocrit


  43.1 %


(39.0-53.0)


 


Mean Corpuscular Volume


  89 fL ()


 


 


Mean Corpuscular Hemoglobin 29 pg (25-35)  


 


Mean Corpuscular Hemoglobin


Concent 33 g/dL


(31-37)


 


Red Cell Distribution Width


  16.0 %


(11.5-14.5)  H


 


Platelet Count


  190 x10^3/uL


(140-400)


 


Neutrophils (%) (Auto) 80 % (31-73)  H


 


Lymphocytes (%) (Auto) 12 % (24-48)  L


 


Monocytes (%) (Auto) 8 % (0-9)  


 


Eosinophils (%) (Auto) 1 % (0-3)  


 


Basophils (%) (Auto) 1 % (0-3)  


 


Neutrophils # (Auto)


  4.9 x10^3uL


(1.8-7.7)


 


Lymphocytes # (Auto)


  0.7 x10^3/uL


(1.0-4.8)  L


 


Monocytes # (Auto)


  0.5 x10^3/uL


(0.0-1.1)


 


Eosinophils # (Auto)


  0.0 x10^3/uL


(0.0-0.7)


 


Basophils # (Auto)


  0.0 x10^3/uL


(0.0-0.2)


 


Prothrombin Time


  13.5 SEC


(11.7-14.0)


 


Prothrombin Time INR 1.1 (0.8-1.1)  


 


Sodium Level


  137 mmol/L


(136-145)


 


Potassium Level


  5.0 mmol/L


(3.5-5.1)


 


Chloride Level


  94 mmol/L


()  L


 


Carbon Dioxide Level


  39 mmol/L


(21-32)  H


 


Anion Gap 4 (6-14)  L


 


Blood Urea Nitrogen


  10 mg/dL


(8-26)


 


Creatinine


  1.0 mg/dL


(0.7-1.3)


 


Estimated GFR


(Cockcroft-Gault) 76.5  


 


 


BUN/Creatinine Ratio 10 (6-20)  


 


Glucose Level


  180 mg/dL


(70-99)  H


 


Calcium Level


  8.8 mg/dL


(8.5-10.1)


 


Magnesium Level


  1.4 mg/dL


(1.8-2.4)  L


 


Total Bilirubin


  0.2 mg/dL


(0.2-1.0)


 


Aspartate Amino Transferase


(AST) 22 U/L (15-37)


 


 


Alanine Aminotransferase (ALT)


  17 U/L (16-63)


 


 


Alkaline Phosphatase


  100 U/L


()


 


Creatine Kinase


  146 U/L


()


 


Creatine Kinase MB (Mass)


  2.6 ng/mL


(0.0-3.6)


 


Creatine Kinase MB Relative


Index 1.7 % (0-4)  


 


 


Troponin I Quantitative


  < 0.017 ng/mL


(0.000-0.055)


 


NT-Pro-B-Type Natriuretic


Peptide 454 pg/mL


(0-124)  H


 


Total Protein


  7.5 g/dL


(6.4-8.2)


 


Albumin


  3.2 g/dL


(3.4-5.0)  L


 


Albumin/Globulin Ratio


  0.7 (1.0-1.7)


L





 Laboratory Tests


10/9/17 23:57








 Laboratory Tests


10/9/17 23:57














EKG


EKG


EKG interpreted by myself at 0005 AM shows sinus tach, rate of 111, nonspecific 

ST changes, no ST elevation.





Radiology/Procedures


Radiology/Procedures


Chest x-ray interpreted by myself at 0050 a.m. shows flattened lung fields 

consistent with COPD changes. No pleural effusion, no pneumothorax, no 

infiltrates.





Course & Med Decision Making


Course & Med Decision Making


The patient upon arrival. He was placed on our BiPAP transferred from EMS CPAP 

machine. He was markedly improved. Duoneb dosed here. ABG was 7.39/59/57 (mask 

leak detected and corrected following gas). He is markedly improved now P 100; 

sat 95%; BP 96/59





Admit to Dr Shelton (PCP) to med/tele. Did not feel antibiotics were needed as 

he had no infectious complaints and the etiology of his exacerbation was lack 

of oxygen at home.  Magnesium replaced (low at 1.4)





I spent approximately 30  minutes working and engaged directly in the patient 

care providing critical care evaluation this includes but not limited to time 

spent engaged in work directly related to the individual patients care. I spent 

time at the bedside, reviewing test results, discussing the case with staff, 

documenting the medical record and time spent with EMS discussing specific 

treatment issues when the patient presented and during his evaluation. This 

includes any discussion and updates with family members and/or patient. 


 





I have spoken with the patient and/or caregivers. I have explained the patient'

s condition, diagnosis and treatment plan based on the information available to 

me at this time. I have answered the patient's and/or caregiver's questions and 

addressed any concerns. The patient and/or caregivers have as good an 

understanding of the patient's diagnosis, condition and treatment plan as can 

be expected at this point. The patient has been stabilized within the 

capability of the emergency department. The patient will be transported for 

further care and management or will be moved to an observation or inpatient 

service. I have communicated with the staff or medical practitioner taking over 

this patient's care.





Dragon Disclaimer


Dragon Disclaimer


This electronic medical record was generated, in whole or in part, using a 

voice recognition dictation system.





Departure


Departure


Impression:  


 Primary Impression:  


 COPD exacerbation


 Additional Impression:  


 Acute on chronic respiratory failure


Disposition:  09 ADMITTED AS INPATIENT


Admitting Physician:  Marky Shelton


Condition:  IMPROVED


Referrals:  


MARKY SHELTON MD (PCP)





Problem Qualifiers








 Additional Impression:  


 Acute on chronic respiratory failure


 Respiratory failure complication:  hypoxia  Qualified Codes:  J96.21 - Acute 

and chronic respiratory failure with hypoxia








TONJA GATES MD Oct 10, 2017 00:53

## 2017-10-10 NOTE — EKG
[facility]

               [address] [place] [address]

Test Date:    2017-10-10               Test Time:    00:05:30

Pat Name:     ZACHARY LENZ          Department:   

Patient ID:   [id number]           Room:         [facility]

Gender:       M                        Technician:   

:          [id number]               Requested By: TONJA GATES

Order Number: [id number]            Reading MD:   Mandie Logan

                                 Measurements

Intervals                              Axis          

Rate:         111                      P:            -90

MT:           102                      QRS:          87

QRSD:         72                       T:            37

QT:           302                                    

QTc:          414                                    

                           Interpretive Statements

SINUS TACHYCARDIA

OTHERWISE NORMAL EKG

Electronically Signed On 10- 20:00:40 CDT by Mandie Logan

## 2017-10-10 NOTE — CONS
DATE OF CONSULTATION:  10/10/2017



ATTENDING PHYSICIAN:  Dr. Jamia Wellington.



REASON FOR CONSULTATION:  The patient is seen in pulmonary consultation at the

request of Dr. Wellington for increasing shortness of air, previously abnormal CT of

the chest.



HISTORY OF PRESENT ILLNESS:  The patient is a 57-year-old that has underlying

and chronic respiratory failure, on oxygen supplementation at home.  Apparently,

he became short of breath as a consequence of losing power.  He was more short

of breath.  He was evaluated and admitted.  In the Emergency Room, the patient

was desaturating, he was placed on BiPAP.  He reports a cough, mostly

nonproductive, no hemoptysis.  He continues to smoke.



PAST MEDICAL HISTORY:  Otherwise remarkable for chronic respiratory failure,

COPD, tobacco dependence, diabetes, gastroesophageal reflux, seizures.



ALLERGIES:  PAROXETINE AND MIDAZOLAM.



CURRENT MEDICATIONS:  List was reviewed.  Please see the MRAD.



SOCIAL HISTORY:  He continues to smoke.



FAMILY HISTORY:  No family history of lung disorders.



REVIEW OF SYSTEMS:  As indicated above, otherwise, a 10-point system was

reviewed and negative.



PHYSICAL EXAMINATION:

VITAL SIGNS:  The patient was in no respiratory distress, off of BiPAP, on

oxygen supplementation.

HEENT:  Eyes, the sclerae were nonicteric.

NECK:  Jugular venous distention was not elevated.  No lymphadenopathy.

CHEST:  Full expansion.

LUNGS:  Coarse breath sounds with no wheezes.

CARDIOVASCULAR:  Regular rate and rhythm with S1, S2, no S3.

ABDOMEN:  Soft, nontender, nondistended.

EXTREMITIES:  No clubbing, cyanosis or edema.

NEUROLOGIC:  The patient was awake, alert, following commands.  A detailed neuro

exam was not performed.



LABORATORY DATA:  Noted.  White count was normal.  Hemoglobin and hematocrit

were noted.  Arterial blood gas, pH 7.40, pCO2 of 60, pO2 of 58.  Chest x-ray

reviewed, no acute cardiopulmonary process.



IMPRESSION:

1.  Acute on chronic respiratory failure.

2.  Acute exacerbation of chronic obstructive pulmonary disease.

3.  Previously abnormal CT of the chest revealing a 5 mm noncalcified pulmonary

nodule in lower lobes.

4.  Mediastinal adenopathy along with hilar adenopathy.

5.  Tobacco dependence.



PLAN:

1.  Recommend repeating the CAT scan in 11/2017.

2.  The patient is instructed on the importance of discontinuing tobacco use.

3.  Concur with current medical management.

4.  Continue oxygen supplementation.

5.  Continue current oxygen supplementation at current level.  Avoid excessive

oxygen supplementation.  I suggested that we will continue current oxygen

supplementation.

5.  Nebulized treatments.

6.  Steroids.



I do appreciate the privilege in sharing the patient's care.

 



______________________________

HORTENSIA GARCIA MD



DR:  SUKHWINDER/duane  JOB#:  [id number] / [id number]

DD:  10/10/2017 16:51  DT:  10/10/2017 19:41

## 2017-10-11 VITALS
DIASTOLIC BLOOD PRESSURE: 68 MMHG | SYSTOLIC BLOOD PRESSURE: 116 MMHG | SYSTOLIC BLOOD PRESSURE: 116 MMHG | DIASTOLIC BLOOD PRESSURE: 68 MMHG

## 2017-10-11 VITALS — SYSTOLIC BLOOD PRESSURE: 145 MMHG | DIASTOLIC BLOOD PRESSURE: 76 MMHG

## 2017-10-11 VITALS — DIASTOLIC BLOOD PRESSURE: 72 MMHG | SYSTOLIC BLOOD PRESSURE: 120 MMHG

## 2017-10-11 LAB
ANION GAP SERPL CALC-SCNC: 1 MMOL/L (ref 6–14)
BASOPHILS # BLD AUTO: 0 X10^3/UL (ref 0–0.2)
BASOPHILS NFR BLD: 0 % (ref 0–3)
BUN SERPL-MCNC: 19 MG/DL (ref 8–26)
CALCIUM SERPL-MCNC: 8.9 MG/DL (ref 8.5–10.1)
CHLORIDE SERPL-SCNC: 96 MMOL/L (ref 98–107)
CO2 SERPL-SCNC: 39 MMOL/L (ref 21–32)
CREAT SERPL-MCNC: 1.1 MG/DL (ref 0.7–1.3)
EOSINOPHIL NFR BLD: 0 % (ref 0–3)
ERYTHROCYTE [DISTWIDTH] IN BLOOD BY AUTOMATED COUNT: 16 % (ref 11.5–14.5)
GFR SERPLBLD BASED ON 1.73 SQ M-ARVRAT: 68.5 ML/MIN
GLUCOSE SERPL-MCNC: 125 MG/DL (ref 70–99)
HCT VFR BLD CALC: 39.5 % (ref 39–53)
HGB BLD-MCNC: 13 G/DL (ref 13–17.5)
LYMPHOCYTES # BLD: 1.3 X10^3/UL (ref 1–4.8)
LYMPHOCYTES NFR BLD AUTO: 18 % (ref 24–48)
MCH RBC QN AUTO: 29 PG (ref 25–35)
MCHC RBC AUTO-ENTMCNC: 33 G/DL (ref 31–37)
MCV RBC AUTO: 88 FL (ref 79–100)
MONOCYTES NFR BLD: 7 % (ref 0–9)
NEUTROPHILS NFR BLD AUTO: 75 % (ref 31–73)
PLATELET # BLD AUTO: 196 X10^3/UL (ref 140–400)
POTASSIUM SERPL-SCNC: 4.2 MMOL/L (ref 3.5–5.1)
RBC # BLD AUTO: 4.49 X10^6/UL (ref 4.3–5.7)
SODIUM SERPL-SCNC: 136 MMOL/L (ref 136–145)
WBC # BLD AUTO: 7 X10^3/UL (ref 4–11)

## 2017-10-11 RX ADMIN — LIDOCAINE SCH PATCH: 50 PATCH CUTANEOUS at 07:36

## 2017-10-11 RX ADMIN — BACLOFEN SCH MG: 10 TABLET ORAL at 07:37

## 2017-10-11 RX ADMIN — IPRATROPIUM BROMIDE AND ALBUTEROL SULFATE SCH ML: .5; 3 SOLUTION RESPIRATORY (INHALATION) at 08:27

## 2017-10-11 RX ADMIN — ASPIRIN 81 MG SCH MG: 81 TABLET ORAL at 07:36

## 2017-10-11 RX ADMIN — PANTOPRAZOLE SODIUM SCH MG: 40 TABLET, DELAYED RELEASE ORAL at 07:36

## 2017-10-11 RX ADMIN — IPRATROPIUM BROMIDE AND ALBUTEROL SULFATE SCH ML: .5; 3 SOLUTION RESPIRATORY (INHALATION) at 11:15

## 2017-10-11 RX ADMIN — TAMSULOSIN HYDROCHLORIDE SCH MG: 0.4 CAPSULE ORAL at 07:36

## 2017-10-11 RX ADMIN — FUROSEMIDE SCH MG: 20 TABLET ORAL at 07:36

## 2017-10-11 RX ADMIN — BUDESONIDE SCH MG: 0.5 INHALANT RESPIRATORY (INHALATION) at 08:27

## 2017-10-11 NOTE — PDOC3
Discharge Summary [facility]


Date of Admission:  Oct 10, 2017


Discharge Date:  Oct 11, 2017


Admitting Diagnosis


COPD exacerbation, acute on chronic hypoxemic hypercapneic respiratory failure


Problems:  


Final Diagnosis


Problems


Medical Problems:


(1) Acute on chronic respiratory failure


Status: Acute  





(2) COPD exacerbation


Status: Acute  








CONSULTS


Dr. Voss


Procedures


None


Brief Hospital Course


Mr. Mann  is a 57 old male who presented with hypoxia and increased 

shortness of breath after his power was shut off from not paying his bill. He 

was placed on Bipap to maintain oxygenation and given IV solumedrol for COPD 

exacerbation. An ABG was consistent with chronic hypoxic hypercapnic 

respiratory failure and did not improve with Bipap (PaCO2 was 60). As he denied 

increased productive cough, change in color of sputum and fever, he was only 

treated with steroids, no antibiotics were required. He was able to wean off 

Bipap to his baseline O2 of 3L on the day of admission. His power was turned 

back on and he was able to discharge home safely. He will complete a 5 day 

course of steroids and follow up in clinic in 1 week. All home meds remained 

the same.


Problems:  


Disposition


Home, self care


CONDITION AT DISCHARGE:  Stable


Diet


Diabetic


Scheduled


Amitriptyline Hcl (Amitriptyline Hcl), 1 TAB PO QHS, (Reported)


Aspirin (Children's Aspirin), 81 MG PO DAILY


Clonazepam (Klonopin), 0.5 MG PO BID


Fluticasone/Salmeterol (Advair 250-50 Diskus), 1 PUFF IH BID


Furosemide (Lasix), 20 MG PO BID, (Reported)


Levetiracetam (Levetiracetam), 500 MG PO BID, (Reported)


Lidocaine (Lidocaine), 1 EACH TP DAILY, (Reported)


Omeprazole (Omeprazole), 1 CAP PO DAILY, (Reported)


Tamsulosin Hcl (Flomax), 0.4 MG PO DAILY, (Reported)


[Baclofen], 20 MG PO BID





Scheduled PRN


Albuterol Sulfate (Ventolin Hfa Inhaler), 2 PUFF INH PRN Q4HRS PRN for WHEEZING


Albuterol Sulfate (Albuterol Sulfate Neb Soln), 1 VIAL NEB PRN Q6HRS PRN for 

SHORTNESS OF BREATH, (Reported)


[Polyethylene Glycol 3350], 17 GM PO PRN DAILY PRN for CONSTIPATION 1st choice





Discontinued Medications


Lansoprazole (Lansoprazole), 30 MG PO DAILY, (Reported)


Follow Up


1 week with Dr. Shelton or SANDHYA Bundy MD Oct 11, 2017 14:46

## 2017-10-11 NOTE — PDOC
PROGRESS NOTES


Subjective


Subjective


Doing well this AM. Tolerating regular diet. Stable off bipap while awake on 

baseline O2 level. Hasn't heard back about his power being turned back on. No 

other concerns.





Objective


Objective





Vital Signs








  Date Time  Temp Pulse Resp B/P (MAP) Pulse Ox O2 Delivery O2 Flow Rate FiO2


 


10/11/17 03:23 97.9 84 20 120/72 (88) 92 Nasal Cannula  





 97.9       


 


10/10/17 20:04       3.0 











Physical Exam


COMMENT


Lungs: Increased rhonchi from yesterday, somewhat cleared with cough, 

nonlabored respirations


Heart: RRR


Skin: Skin nodules on b/l forearms that are pruritic


Gen: Alert and oriented





Assessment


Assessment


Problems


Medical Problems:


(1) Acute on chronic respiratory failure


Status: Acute  





(2) COPD exacerbation


Status: Acute  











Plan


Plan of Care


Chronic hypoxemic hypercapneic respiratory failure - cont O2 supplementation. 

Currently at baseline 3L O2 NC


COPD exacerbation - continue prednisone 40 mg daily to complete 5 days. No 

signs of infection at this point in time, defer antibiotics. Pulm following. 

Alb and abel. 


Nicotine dependence - encourage to quit smoking. Would like a nicotine patch 

while inpt


Pulmary nodule - 5mm noncalcified nodule in lower lobe, need repeat CT 11/17


Skin nodules - triamcinolone cream PRN itching, consider biopsy vs removal as 

outpatient if desired


Social issues - social support services consulted for loss of power/ran out of 

oxygen


For all other chronic health conditions continue home medications.





Dispo: Able to dc when power is back on.





Comment


Labs


Reviewed


Medications


Reviewed


Vitals/I & O





Vital Sign - Last 24 Hours








 10/10/17 10/10/17 10/10/17 10/10/17





 10:56 11:29 14:50 15:35


 


Temp 97.9  97.9 





 97.9  97.9 


 


Pulse 73  16 


 


Resp 18  18 


 


B/P (MAP) 106/67 (80)  115/68 (84) 


 


Pulse Ox 94 94 95 95


 


O2 Delivery BiPAP/CPAP BiPAP/CPAP Nasal Cannula Nasal Cannula


 


O2 Flow Rate   3.0 3.0


 


    





    





 10/10/17 10/10/17 10/10/17 10/10/17





 19:00 19:57 20:01 20:04


 


Temp 98.1   





 98.1   


 


Pulse 82   


 


Resp 20   


 


B/P (MAP) 117/71 (86)   


 


Pulse Ox 91 94 94 


 


O2 Delivery Nasal Cannula Nasal Cannula Nasal Cannula Bi-pap


 


O2 Flow Rate  3.0 3.0 3.0


 


    





    





 10/10/17 10/11/17 10/11/17 





 23:00 02:56 03:23 


 


Temp 97.9  97.9 





 97.9  97.9 


 


Pulse 88  84 


 


Resp 20  20 


 


B/P (MAP) 113/65 (81)  120/72 (88) 


 


Pulse Ox 91  92 


 


O2 Delivery Nasal Cannula BiPAP/CPAP Nasal Cannula 








Images


Reviewed











SANDHYA PHILLIPS MD Oct 11, 2017 08:24

## 2017-10-11 NOTE — PDOC
PULMONARY PROGRESS NOTES


Subjective


PT FEELS BETTER LESS SOA


Vitals





Vital Signs








  Date Time  Temp Pulse Resp B/P (MAP) Pulse Ox O2 Delivery O2 Flow Rate FiO2


 


10/11/17 09:05     97 Nasal Cannula 3.0 


 


10/11/17 03:23 97.9 84 20 120/72 (88)    





 97.9       








General:  Alert


HEENT:  Other


Lungs:  Clear


Cardiovascular:  S1, S2


Abdomen:  Soft, Non-tender


Extremities:  No Edema


Labs





Laboratory Tests








Test


  10/9/17


23:57 10/10/17


01:09 10/10/17


03:05 10/10/17


07:01


 


White Blood Count


  6.1 x10^3/uL


(4.0-11.0) 


  


  


 


 


Red Blood Count


  4.83 x10^6/uL


(4.30-5.70) 


  


  


 


 


Hemoglobin


  14.1 g/dL


(13.0-17.5) 


  


  


 


 


Hematocrit


  43.1 %


(39.0-53.0) 


  


  


 


 


Mean Corpuscular Volume 89 fL ()    


 


Mean Corpuscular Hemoglobin 29 pg (25-35)    


 


Mean Corpuscular Hemoglobin


Concent 33 g/dL


(31-37) 


  


  


 


 


Red Cell Distribution Width


  16.0 %


(11.5-14.5) 


  


  


 


 


Platelet Count


  190 x10^3/uL


(140-400) 


  


  


 


 


Neutrophils (%) (Auto) 80 % (31-73)    


 


Lymphocytes (%) (Auto) 12 % (24-48)    


 


Monocytes (%) (Auto) 8 % (0-9)    


 


Eosinophils (%) (Auto) 1 % (0-3)    


 


Basophils (%) (Auto) 1 % (0-3)    


 


Neutrophils # (Auto)


  4.9 x10^3uL


(1.8-7.7) 


  


  


 


 


Lymphocytes # (Auto)


  0.7 x10^3/uL


(1.0-4.8) 


  


  


 


 


Monocytes # (Auto)


  0.5 x10^3/uL


(0.0-1.1) 


  


  


 


 


Eosinophils # (Auto)


  0.0 x10^3/uL


(0.0-0.7) 


  


  


 


 


Basophils # (Auto)


  0.0 x10^3/uL


(0.0-0.2) 


  


  


 


 


Prothrombin Time


  13.5 SEC


(11.7-14.0) 


  


  


 


 


Prothromb Time International


Ratio 1.1 (0.8-1.1) 


  


  


  


 


 


Sodium Level


  137 mmol/L


(136-145) 


  


  


 


 


Potassium Level


  5.0 mmol/L


(3.5-5.1) 


  


  


 


 


Chloride Level


  94 mmol/L


() 


  


  


 


 


Carbon Dioxide Level


  39 mmol/L


(21-32) 


  


  


 


 


Anion Gap 4 (6-14)    


 


Blood Urea Nitrogen


  10 mg/dL


(8-26) 


  


  


 


 


Creatinine


  1.0 mg/dL


(0.7-1.3) 


  


  


 


 


Estimated GFR


(Cockcroft-Gault) 76.5 


  


  


  


 


 


BUN/Creatinine Ratio 10 (6-20)    


 


Glucose Level


  180 mg/dL


(70-99) 


  


  


 


 


Calcium Level


  8.8 mg/dL


(8.5-10.1) 


  


  


 


 


Magnesium Level


  1.4 mg/dL


(1.8-2.4) 


  


  


 


 


Total Bilirubin


  0.2 mg/dL


(0.2-1.0) 


  


  


 


 


Aspartate Amino Transf


(AST/SGOT) 22 U/L (15-37) 


  


  


  


 


 


Alanine Aminotransferase


(ALT/SGPT) 17 U/L (16-63) 


  


  


  


 


 


Alkaline Phosphatase


  100 U/L


() 


  


  


 


 


Creatine Kinase


  146 U/L


() 


  


  


 


 


Creatine Kinase MB (Mass)


  2.6 ng/mL


(0.0-3.6) 


  


  


 


 


Creatine Kinase MB Relative


Index 1.7 % (0-4) 


  


  


  


 


 


Troponin I Quantitative


  < 0.017 ng/mL


(0.000-0.055) 


  


  


 


 


NT-Pro-B-Type Natriuretic


Peptide 454 pg/mL


(0-124) 


  


  


 


 


Total Protein


  7.5 g/dL


(6.4-8.2) 


  


  


 


 


Albumin


  3.2 g/dL


(3.4-5.0) 


  


  


 


 


Albumin/Globulin Ratio 0.7 (1.0-1.7)    


 


O2 Saturation  89 % (92-99)   


 


Arterial Blood pH


  


  7.40


(7.35-7.45) 


  


 


 


Arterial Blood pCO2 at


Patient Temp 


  60 mmHg


(35-46) 


  


 


 


Arterial Blood pO2 at Patient


Temp 


  58 mmHg


() 


  


 


 


Arterial Blood HCO3


  


  36 mmol/L


(21-28) 


  


 


 


Arterial Blood Base Excess


  


  9 mmol/L


(-3-3) 


  


 


 


FiO2  40.0   


 


Nasal Screen MRSA (PCR)


  


  


  Positive


(Negative) 


 


 


Glucose (Fingerstick)


  


  


  


  202 mg/dL


(70-99)


 


Test


  10/10/17


10:23 10/10/17


12:45 10/10/17


13:00 10/10/17


16:17


 


Glucose (Fingerstick)


  193 mg/dL


(70-99) 


  


  362 mg/dL


(70-99)


 


O2 Saturation  89 % (92-99)   


 


Arterial Blood pH


  


  7.40


(7.35-7.45) 


  


 


 


Arterial Blood pCO2 at


Patient Temp 


  64 mmHg


(35-46) 


  


 


 


Arterial Blood pO2 at Patient


Temp 


  60 mmHg


() 


  


 


 


Arterial Blood HCO3


  


  38 mmol/L


(21-28) 


  


 


 


Arterial Blood Base Excess


  


  11 mmol/L


(-3-3) 


  


 


 


FiO2  40   


 


Troponin I Quantitative


  


  


  < 0.017 ng/mL


(0.000-0.055) 


 


 


Test


  10/10/17


20:24 10/11/17


03:40 


  


 


 


Glucose (Fingerstick)


  197 mg/dL


(70-99) 


  


  


 


 


White Blood Count


  


  7.0 x10^3/uL


(4.0-11.0) 


  


 


 


Red Blood Count


  


  4.49 x10^6/uL


(4.30-5.70) 


  


 


 


Hemoglobin


  


  13.0 g/dL


(13.0-17.5) 


  


 


 


Hematocrit


  


  39.5 %


(39.0-53.0) 


  


 


 


Mean Corpuscular Volume  88 fL ()   


 


Mean Corpuscular Hemoglobin  29 pg (25-35)   


 


Mean Corpuscular Hemoglobin


Concent 


  33 g/dL


(31-37) 


  


 


 


Red Cell Distribution Width


  


  16.0 %


(11.5-14.5) 


  


 


 


Platelet Count


  


  196 x10^3/uL


(140-400) 


  


 


 


Neutrophils (%) (Auto)  75 % (31-73)   


 


Lymphocytes (%) (Auto)  18 % (24-48)   


 


Monocytes (%) (Auto)  7 % (0-9)   


 


Eosinophils (%) (Auto)  0 % (0-3)   


 


Basophils (%) (Auto)  0 % (0-3)   


 


Neutrophils # (Auto)


  


  5.3 x10^3uL


(1.8-7.7) 


  


 


 


Lymphocytes # (Auto)


  


  1.3 x10^3/uL


(1.0-4.8) 


  


 


 


Monocytes # (Auto)


  


  0.5 x10^3/uL


(0.0-1.1) 


  


 


 


Eosinophils # (Auto)


  


  0.0 x10^3/uL


(0.0-0.7) 


  


 


 


Basophils # (Auto)


  


  0.0 x10^3/uL


(0.0-0.2) 


  


 


 


Sodium Level


  


  136 mmol/L


(136-145) 


  


 


 


Potassium Level


  


  4.2 mmol/L


(3.5-5.1) 


  


 


 


Chloride Level


  


  96 mmol/L


() 


  


 


 


Carbon Dioxide Level


  


  39 mmol/L


(21-32) 


  


 


 


Anion Gap  1 (6-14)   


 


Blood Urea Nitrogen


  


  19 mg/dL


(8-26) 


  


 


 


Creatinine


  


  1.1 mg/dL


(0.7-1.3) 


  


 


 


Estimated GFR


(Cockcroft-Gault) 


  68.5 


  


  


 


 


Glucose Level


  


  125 mg/dL


(70-99) 


  


 


 


Calcium Level


  


  8.9 mg/dL


(8.5-10.1) 


  


 








Laboratory Tests








Test


  10/10/17


10:23 10/10/17


12:45 10/10/17


13:00 10/10/17


16:17


 


Glucose (Fingerstick)


  193 mg/dL


(70-99) 


  


  362 mg/dL


(70-99)


 


O2 Saturation  89 % (92-99)   


 


Arterial Blood pH


  


  7.40


(7.35-7.45) 


  


 


 


Arterial Blood pCO2 at


Patient Temp 


  64 mmHg


(35-46) 


  


 


 


Arterial Blood pO2 at Patient


Temp 


  60 mmHg


() 


  


 


 


Arterial Blood HCO3


  


  38 mmol/L


(21-28) 


  


 


 


Arterial Blood Base Excess


  


  11 mmol/L


(-3-3) 


  


 


 


FiO2  40   


 


Troponin I Quantitative


  


  


  < 0.017 ng/mL


(0.000-0.055) 


 


 


Test


  10/10/17


20:24 10/11/17


03:40 


  


 


 


Glucose (Fingerstick)


  197 mg/dL


(70-99) 


  


  


 


 


White Blood Count


  


  7.0 x10^3/uL


(4.0-11.0) 


  


 


 


Red Blood Count


  


  4.49 x10^6/uL


(4.30-5.70) 


  


 


 


Hemoglobin


  


  13.0 g/dL


(13.0-17.5) 


  


 


 


Hematocrit


  


  39.5 %


(39.0-53.0) 


  


 


 


Mean Corpuscular Volume  88 fL ()   


 


Mean Corpuscular Hemoglobin  29 pg (25-35)   


 


Mean Corpuscular Hemoglobin


Concent 


  33 g/dL


(31-37) 


  


 


 


Red Cell Distribution Width


  


  16.0 %


(11.5-14.5) 


  


 


 


Platelet Count


  


  196 x10^3/uL


(140-400) 


  


 


 


Neutrophils (%) (Auto)  75 % (31-73)   


 


Lymphocytes (%) (Auto)  18 % (24-48)   


 


Monocytes (%) (Auto)  7 % (0-9)   


 


Eosinophils (%) (Auto)  0 % (0-3)   


 


Basophils (%) (Auto)  0 % (0-3)   


 


Neutrophils # (Auto)


  


  5.3 x10^3uL


(1.8-7.7) 


  


 


 


Lymphocytes # (Auto)


  


  1.3 x10^3/uL


(1.0-4.8) 


  


 


 


Monocytes # (Auto)


  


  0.5 x10^3/uL


(0.0-1.1) 


  


 


 


Eosinophils # (Auto)


  


  0.0 x10^3/uL


(0.0-0.7) 


  


 


 


Basophils # (Auto)


  


  0.0 x10^3/uL


(0.0-0.2) 


  


 


 


Sodium Level


  


  136 mmol/L


(136-145) 


  


 


 


Potassium Level


  


  4.2 mmol/L


(3.5-5.1) 


  


 


 


Chloride Level


  


  96 mmol/L


() 


  


 


 


Carbon Dioxide Level


  


  39 mmol/L


(21-32) 


  


 


 


Anion Gap  1 (6-14)   


 


Blood Urea Nitrogen


  


  19 mg/dL


(8-26) 


  


 


 


Creatinine


  


  1.1 mg/dL


(0.7-1.3) 


  


 


 


Estimated GFR


(Cockcroft-Gault) 


  68.5 


  


  


 


 


Glucose Level


  


  125 mg/dL


(70-99) 


  


 


 


Calcium Level


  


  8.9 mg/dL


(8.5-10.1) 


  


 








Medications





Active Scripts








 Medications  Dose


 Route/Sig


 Max Daily Dose Days Date Category


 


 Lidocaine 1 Each


 Adh..patch  1 Each


 TP DAILY


    8/26/17 Reported


 


 Omeprazole 40 Mg


 Capsule.dr  1 Cap


 PO DAILY


    8/26/17 Reported


 


 Amitriptyline Hcl


 25 Mg Tablet  1 Tab


 PO QHS


    8/26/17 Reported


 


 Albuterol Sulfate


 Neb Soln


  (Albuterol


 Sulfate) 2.5 Mg/3


 Ml Vial.neb  1 Vial


 NEB PRN Q6HRS PRN


    8/26/17 Reported


 


 Levetiracetam 500


 Mg Tablet  500 Mg


 PO BID


    8/16/16 Reported


 


 Flomax


  (Tamsulosin Hcl)


 0.4 Mg Cap.er.24h  0.4 Mg


 PO DAILY


    1/4/16 Reported


 


 Lasix


  (Furosemide) 20


 Mg Tablet  20 Mg


 PO BID


    9/28/15 Reported


 


 [Polyethylene


 Glycol 3350] 17


 GM Packet  17 Gm


 PO PRN DAILY PRN


    9/18/15 Rx


 


 Advair 250-50


 Diskus


  (Fluticasone/Salmeterol)


 1 Puff Puff  1 Puff


 IH BID


    9/18/15 Rx


 


 Klonopin


  (Clonazepam) 0.5


 Mg Tablet  0.5 Mg


 PO BID


    9/18/15 Rx


 


 [Baclofen] 10 MG


 Tablet  20 Mg


 PO BID


    9/18/15 Rx


 


 Children's


 Aspirin (Aspirin)


 81 Mg Tab.chew  81 Mg


 PO DAILY


    9/18/15 Rx


 


 Ventolin Hfa


 Inhaler


  (Albuterol


 Sulfate) 1 Puff


 Puff  2 Puff


 INH PRN Q4HRS PRN


    9/18/15 Rx











Impression


.


1.  Acute on chronic respiratory failure.


2.  Acute exacerbation of chronic obstructive pulmonary disease.


3.  Previously abnormal CT of the chest revealing a 5 mm noncalcified pulmonary


nodule in lower lobes.


4.  Mediastinal adenopathy along with hilar adenopathy.


5.  Tobacco dependence.





Plan


.


PT GIVEN A FOLLOW UP APPT IN NOVEMBER OK TO D/C


1.  Recommend repeating the CAT scan in 11/2017.


2.  The patient is instructed on the importance of discontinuing tobacco use.


3.  Concur with current medical management.


4.  Continue oxygen supplementation.


5.  Continue current oxygen supplementation at current level


5.  Nebulized treatments.


6.  Steroids.











HORTENSIA GARCIA MD Oct 11, 2017 09:18

## 2017-11-27 ENCOUNTER — HOSPITAL ENCOUNTER (OUTPATIENT)
Dept: HOSPITAL 61 - CT | Age: 57
Discharge: HOME | End: 2017-11-27
Attending: INTERNAL MEDICINE
Payer: MEDICARE

## 2017-11-27 DIAGNOSIS — J47.9: Primary | ICD-10-CM

## 2017-11-27 PROCEDURE — 71250 CT THORAX DX C-: CPT

## 2017-11-27 NOTE — RAD
CT chest without contrast



Indication: Lung nodule follow-up



Technique: CT chest without IV contrast with multiplanar reformats.



Comparison: Previous study from 8/27/2017 and from 2010



Findings:

Neck base is clear. Heart is normal in size. No pericardial or pleural

effusion. No axillary, mediastinal or hilar adenopathy. Stable

lipomatosis/lipoma noted at the SVC atrial junction.

Moderate upper lobe predominant emphysema. No pneumothorax. Interval

improvement in previously seen bibasilar patchy opacities. There is bilateral

lower lobe bronchiectasis with residual reticulonodular opacities in the left

lung bases. Redemonstrated are scattered bilateral pulmonary nodules/nodular

opacities. Index lesions as follows:

-Stable 3 mm opacity in the right middle lobe adjacent to the right major

fissure (series 2 image 30).

-Stable 2 mm nodular opacities in the left lower lobe (series 2 image 39, 40)

-3 mm nodular opacity in the left lower lobe (series 2 image 49).



Visualized sections through the liver, spleen, adrenals, kidneys and pancreas

are within normal limits. Redemonstrated is a chronic anterior wedge

compression deformity of T7 vertebral body without retropulsion in the spinal

canal. Spinal catheter is redemonstrated with its tip at the level of T10

vertebral body. Stable chronic appearing mild compression deformity at T9.

There are old anterior left fourth and fifth and old anterior right third,

fourth rib fracture deformities.



Impression:

1. Interval improvement in previously seen bibasilar lung findings. Left

basilar bronchiectasis with interstitial opacities may represent sequela of

prior infection.

2. Stable 2 to 4 mm pulmonary nodules/nodular opacities in the lower lobes.

These are nonspecific and most likely represents infectious or inflammatory

nodules. Follow-up CT chest in 6 months is recommended.







PQRS Compliance Statement:



One or more of the following individualized dose reduction techniques were

utilized for this examination:

1. Automated exposure control

2. Adjustment of the mA and/or kV according to patient size

3. Use of iterative reconstruction technique

## 2017-12-10 ENCOUNTER — HOSPITAL ENCOUNTER (INPATIENT)
Dept: HOSPITAL 61 - ER | Age: 57
LOS: 4 days | Discharge: NURSING, REHAB OR LONG-TERM CARE | DRG: 871 | End: 2017-12-14
Attending: FAMILY MEDICINE | Admitting: FAMILY MEDICINE
Payer: MEDICARE

## 2017-12-10 VITALS — DIASTOLIC BLOOD PRESSURE: 63 MMHG | SYSTOLIC BLOOD PRESSURE: 101 MMHG

## 2017-12-10 VITALS — DIASTOLIC BLOOD PRESSURE: 73 MMHG | SYSTOLIC BLOOD PRESSURE: 141 MMHG

## 2017-12-10 VITALS — SYSTOLIC BLOOD PRESSURE: 134 MMHG | DIASTOLIC BLOOD PRESSURE: 70 MMHG

## 2017-12-10 VITALS — SYSTOLIC BLOOD PRESSURE: 135 MMHG | DIASTOLIC BLOOD PRESSURE: 72 MMHG

## 2017-12-10 VITALS — SYSTOLIC BLOOD PRESSURE: 123 MMHG | DIASTOLIC BLOOD PRESSURE: 70 MMHG

## 2017-12-10 VITALS — SYSTOLIC BLOOD PRESSURE: 142 MMHG | DIASTOLIC BLOOD PRESSURE: 77 MMHG

## 2017-12-10 VITALS — SYSTOLIC BLOOD PRESSURE: 104 MMHG | DIASTOLIC BLOOD PRESSURE: 62 MMHG

## 2017-12-10 VITALS — SYSTOLIC BLOOD PRESSURE: 138 MMHG | DIASTOLIC BLOOD PRESSURE: 78 MMHG

## 2017-12-10 VITALS — DIASTOLIC BLOOD PRESSURE: 57 MMHG | SYSTOLIC BLOOD PRESSURE: 102 MMHG

## 2017-12-10 VITALS — WEIGHT: 134.37 LBS | HEIGHT: 66 IN | BODY MASS INDEX: 21.6 KG/M2

## 2017-12-10 VITALS — DIASTOLIC BLOOD PRESSURE: 76 MMHG | SYSTOLIC BLOOD PRESSURE: 118 MMHG

## 2017-12-10 VITALS — DIASTOLIC BLOOD PRESSURE: 73 MMHG | SYSTOLIC BLOOD PRESSURE: 140 MMHG

## 2017-12-10 DIAGNOSIS — F11.20: ICD-10-CM

## 2017-12-10 DIAGNOSIS — F41.9: ICD-10-CM

## 2017-12-10 DIAGNOSIS — Z66: ICD-10-CM

## 2017-12-10 DIAGNOSIS — Z99.81: ICD-10-CM

## 2017-12-10 DIAGNOSIS — Z82.5: ICD-10-CM

## 2017-12-10 DIAGNOSIS — I21.4: ICD-10-CM

## 2017-12-10 DIAGNOSIS — N40.1: ICD-10-CM

## 2017-12-10 DIAGNOSIS — Z88.8: ICD-10-CM

## 2017-12-10 DIAGNOSIS — Z91.19: ICD-10-CM

## 2017-12-10 DIAGNOSIS — G92: ICD-10-CM

## 2017-12-10 DIAGNOSIS — Z82.49: ICD-10-CM

## 2017-12-10 DIAGNOSIS — Z87.01: ICD-10-CM

## 2017-12-10 DIAGNOSIS — Z83.3: ICD-10-CM

## 2017-12-10 DIAGNOSIS — N17.9: ICD-10-CM

## 2017-12-10 DIAGNOSIS — J44.1: ICD-10-CM

## 2017-12-10 DIAGNOSIS — E87.2: ICD-10-CM

## 2017-12-10 DIAGNOSIS — J96.21: ICD-10-CM

## 2017-12-10 DIAGNOSIS — K21.9: ICD-10-CM

## 2017-12-10 DIAGNOSIS — Z82.3: ICD-10-CM

## 2017-12-10 DIAGNOSIS — E46: ICD-10-CM

## 2017-12-10 DIAGNOSIS — F17.210: ICD-10-CM

## 2017-12-10 DIAGNOSIS — G89.29: ICD-10-CM

## 2017-12-10 DIAGNOSIS — A41.9: Primary | ICD-10-CM

## 2017-12-10 DIAGNOSIS — Z86.718: ICD-10-CM

## 2017-12-10 DIAGNOSIS — E11.9: ICD-10-CM

## 2017-12-10 DIAGNOSIS — Z86.14: ICD-10-CM

## 2017-12-10 DIAGNOSIS — N39.0: ICD-10-CM

## 2017-12-10 DIAGNOSIS — J96.22: ICD-10-CM

## 2017-12-10 DIAGNOSIS — G47.30: ICD-10-CM

## 2017-12-10 LAB
ALBUMIN SERPL-MCNC: 3.3 G/DL (ref 3.4–5)
ALBUMIN/GLOB SERPL: 0.9 {RATIO} (ref 1–1.7)
ALP SERPL-CCNC: 103 U/L (ref 46–116)
ALT SERPL-CCNC: 20 U/L (ref 16–63)
ANION GAP SERPL CALC-SCNC: 6 MMOL/L (ref 6–14)
APTT BLD: 27 SEC (ref 24–38)
AST SERPL-CCNC: 15 U/L (ref 15–37)
BACTERIA #/AREA URNS HPF: (no result) /HPF
BARBITURATES UR-MCNC: (no result) UG/ML
BASE EXCESS STD BLDA CALC-SCNC: 11 MMOL/L (ref -3–3)
BASOPHILS # BLD AUTO: 0 X10^3/UL (ref 0–0.2)
BASOPHILS NFR BLD: 0 % (ref 0–3)
BENZODIAZ UR-MCNC: (no result) UG/L
BILIRUB SERPL-MCNC: 0.4 MG/DL (ref 0.2–1)
BILIRUB UR QL STRIP: NEGATIVE
BUN SERPL-MCNC: 20 MG/DL (ref 8–26)
BUN/CREAT SERPL: 13 (ref 6–20)
CALCIUM SERPL-MCNC: 8.6 MG/DL (ref 8.5–10.1)
CANNABINOIDS UR-MCNC: (no result) UG/L
CHLORIDE SERPL-SCNC: 92 MMOL/L (ref 98–107)
CO2 SERPL-SCNC: 36 MMOL/L (ref 21–32)
COCAINE UR-MCNC: (no result) NG/ML
COHGB MFR BLD: 2.6 % (ref 0–1.9)
CREAT SERPL-MCNC: 1.6 MG/DL (ref 0.7–1.3)
EOSINOPHIL NFR BLD: 0 % (ref 0–3)
ERYTHROCYTE [DISTWIDTH] IN BLOOD BY AUTOMATED COUNT: 16.5 % (ref 11.5–14.5)
FIO2 COOX: 45
GFR SERPLBLD BASED ON 1.73 SQ M-ARVRAT: 44.5 ML/MIN
GLOBULIN SER-MCNC: 3.8 G/DL (ref 2.2–3.8)
GLUCOSE SERPL-MCNC: 129 MG/DL (ref 70–99)
GLUCOSE UR STRIP-MCNC: NEGATIVE MG/DL
HCO3 BLDA-SCNC: 33 MMOL/L (ref 21–28)
HCO3 BLDA-SCNC: 41 MMOL/L (ref 21–28)
HCT VFR BLD CALC: 50.7 % (ref 39–53)
HGB BLD-MCNC: 16.2 G/DL
HGB BLD-MCNC: 16.3 G/DL (ref 13–17.5)
INR PPP: 1.1 (ref 0.8–1.1)
INSPIRATION SETTING TIME VENT: 40
LYMPHOCYTES # BLD: 0.8 X10^3/UL (ref 1–4.8)
LYMPHOCYTES NFR BLD AUTO: 9 % (ref 24–48)
MAGNESIUM SERPL-MCNC: 2.1 MG/DL (ref 1.8–2.4)
MCH RBC QN AUTO: 28 PG (ref 25–35)
MCHC RBC AUTO-ENTMCNC: 32 G/DL (ref 31–37)
MCV RBC AUTO: 87 FL (ref 79–100)
METHADONE SERPL-MCNC: (no result) NG/ML
METHGB MFR BLD: 0.5 % (ref 0–1.9)
MONOCYTES NFR BLD: 6 % (ref 0–9)
NEUTROPHILS NFR BLD AUTO: 84 % (ref 31–73)
NITRITE UR QL STRIP: POSITIVE
OBC FLU: (no result)
OPIATES UR-MCNC: (no result) NG/ML
OXYHGB MFR BLD: 87.3 %
PCO2 BLDA: 59 MMHG (ref 35–46)
PCO2 BLDA: 78 MMHG (ref 35–46)
PCP SERPL-MCNC: (no result) MG/DL
PH ABG: 7.38 (ref 7.35–7.45)
PH COOX: 7.34 (ref 7.35–7.45)
PH UR STRIP: 6 [PH]
PLATELET # BLD AUTO: 216 X10^3/UL (ref 140–400)
PO2 BLDA: 61 MMHG (ref 65–108)
PO2 BLDA: 68 MMHG (ref 65–108)
POTASSIUM SERPL-SCNC: 5.5 MMOL/L (ref 3.5–5.1)
PROT SERPL-MCNC: 7.1 G/DL (ref 6.4–8.2)
PROT UR STRIP-MCNC: 30 MG/DL
PROTHROMBIN TIME: 13.1 SEC (ref 11.7–14)
RBC # BLD AUTO: 5.81 X10^6/UL (ref 4.3–5.7)
RBC #/AREA URNS HPF: (no result) /HPF (ref 0–2)
SAO2 % BLDA: 90 % (ref 92–99)
SAO2 % BLDA: 93 % (ref 92–99)
SODIUM SERPL-SCNC: 134 MMOL/L (ref 136–145)
SP GR UR STRIP: 1.01
SQUAMOUS #/AREA URNS LPF: (no result) /LPF
UROBILINOGEN UR-MCNC: 0.2 MG/DL
WBC # BLD AUTO: 9 X10^3/UL (ref 4–11)

## 2017-12-10 PROCEDURE — 83880 ASSAY OF NATRIURETIC PEPTIDE: CPT

## 2017-12-10 PROCEDURE — G0479 DRUG TEST PRESUMP NOT OPT: HCPCS

## 2017-12-10 PROCEDURE — S0028 INJECTION, FAMOTIDINE, 20 MG: HCPCS

## 2017-12-10 PROCEDURE — 96361 HYDRATE IV INFUSION ADD-ON: CPT

## 2017-12-10 PROCEDURE — 94640 AIRWAY INHALATION TREATMENT: CPT

## 2017-12-10 PROCEDURE — 84481 FREE ASSAY (FT-3): CPT

## 2017-12-10 PROCEDURE — 83735 ASSAY OF MAGNESIUM: CPT

## 2017-12-10 PROCEDURE — 82140 ASSAY OF AMMONIA: CPT

## 2017-12-10 PROCEDURE — 80048 BASIC METABOLIC PNL TOTAL CA: CPT

## 2017-12-10 PROCEDURE — 87186 SC STD MICRODIL/AGAR DIL: CPT

## 2017-12-10 PROCEDURE — 36600 WITHDRAWAL OF ARTERIAL BLOOD: CPT

## 2017-12-10 PROCEDURE — 84439 ASSAY OF FREE THYROXINE: CPT

## 2017-12-10 PROCEDURE — 71010: CPT

## 2017-12-10 PROCEDURE — 83690 ASSAY OF LIPASE: CPT

## 2017-12-10 PROCEDURE — 85610 PROTHROMBIN TIME: CPT

## 2017-12-10 PROCEDURE — 81001 URINALYSIS AUTO W/SCOPE: CPT

## 2017-12-10 PROCEDURE — 84484 ASSAY OF TROPONIN QUANT: CPT

## 2017-12-10 PROCEDURE — 94760 N-INVAS EAR/PLS OXIMETRY 1: CPT

## 2017-12-10 PROCEDURE — 96375 TX/PRO/DX INJ NEW DRUG ADDON: CPT

## 2017-12-10 PROCEDURE — 82805 BLOOD GASES W/O2 SATURATION: CPT

## 2017-12-10 PROCEDURE — 36415 COLL VENOUS BLD VENIPUNCTURE: CPT

## 2017-12-10 PROCEDURE — 80061 LIPID PANEL: CPT

## 2017-12-10 PROCEDURE — 96374 THER/PROPH/DIAG INJ IV PUSH: CPT

## 2017-12-10 PROCEDURE — 93005 ELECTROCARDIOGRAM TRACING: CPT

## 2017-12-10 PROCEDURE — 85025 COMPLETE CBC W/AUTO DIFF WBC: CPT

## 2017-12-10 PROCEDURE — 5A09457 ASSISTANCE WITH RESPIRATORY VENTILATION, 24-96 CONSECUTIVE HOURS, CONTINUOUS POSITIVE AIRWAY PRESSURE: ICD-10-PCS | Performed by: FAMILY MEDICINE

## 2017-12-10 PROCEDURE — 94660 CPAP INITIATION&MGMT: CPT

## 2017-12-10 PROCEDURE — 85730 THROMBOPLASTIN TIME PARTIAL: CPT

## 2017-12-10 PROCEDURE — 93306 TTE W/DOPPLER COMPLETE: CPT

## 2017-12-10 PROCEDURE — 80307 DRUG TEST PRSMV CHEM ANLYZR: CPT

## 2017-12-10 PROCEDURE — 87804 INFLUENZA ASSAY W/OPTIC: CPT

## 2017-12-10 PROCEDURE — 87086 URINE CULTURE/COLONY COUNT: CPT

## 2017-12-10 PROCEDURE — 83605 ASSAY OF LACTIC ACID: CPT

## 2017-12-10 PROCEDURE — 87641 MR-STAPH DNA AMP PROBE: CPT

## 2017-12-10 PROCEDURE — 80053 COMPREHEN METABOLIC PANEL: CPT

## 2017-12-10 PROCEDURE — 84443 ASSAY THYROID STIM HORMONE: CPT

## 2017-12-10 PROCEDURE — 82962 GLUCOSE BLOOD TEST: CPT

## 2017-12-10 PROCEDURE — 87040 BLOOD CULTURE FOR BACTERIA: CPT

## 2017-12-10 RX ADMIN — IPRATROPIUM BROMIDE AND ALBUTEROL SULFATE SCH ML: .5; 3 SOLUTION RESPIRATORY (INHALATION) at 19:43

## 2017-12-10 RX ADMIN — PIPERACILLIN SODIUM AND TAZOBACTAM SODIUM SCH GM: 3; .375 INJECTION, POWDER, FOR SOLUTION INTRAVENOUS at 21:13

## 2017-12-10 RX ADMIN — PIPERACILLIN SODIUM AND TAZOBACTAM SODIUM SCH GM: 3; .375 INJECTION, POWDER, FOR SOLUTION INTRAVENOUS at 16:56

## 2017-12-10 RX ADMIN — HALOPERIDOL LACTATE PRN MG: 5 INJECTION, SOLUTION INTRAMUSCULAR at 21:12

## 2017-12-10 RX ADMIN — IPRATROPIUM BROMIDE AND ALBUTEROL SULFATE SCH ML: .5; 3 SOLUTION RESPIRATORY (INHALATION) at 15:26

## 2017-12-10 RX ADMIN — PIPERACILLIN SODIUM AND TAZOBACTAM SODIUM SCH GM: 3; .375 INJECTION, POWDER, FOR SOLUTION INTRAVENOUS at 23:56

## 2017-12-10 NOTE — RAD
AP chest.



History: Short of air



AP view was taken of the chest. There is an old right rib fracture. Heart is

normal in size. There is mild left lung base atelectasis or infiltrate. A

lateral view could be of benefit. Heart is normal in size. The patient is

rotated to the left.



Impression:

1. Mild hazy left base atelectasis or infiltrate. PA and lateral views would

be of benefit.

## 2017-12-10 NOTE — EKG
[facility]

              [address] [place] [address]

Test Date:    2017-12-10               Test Time:    11:01:29

Pat Name:     ZACHARY LENZ          Department:   

Patient ID:   [id number]           Room:          

Gender:       M                        Technician:   

:          [id number]               Requested By: MONIKA EDGE

Order Number: [id number]            Reading MD:   Noé Donohue MD

                                 Measurements

Intervals                              Axis          

Rate:         120                      P:            90

MA:           132                      QRS:          95

QRSD:         72                       T:            149

QT:           284                                    

QTc:          406                                    

                           Interpretive Statements

SINUS TACHYCARDIA

RIGHTWARD AXIS

NON-SPECIFIC ST/T CHANGES

Electronically Signed On 2017 11:58:19 CST by Noé Donohue MD

## 2017-12-10 NOTE — PDOC
Infectious Disease Note


Vital Sign


Vital Signs





Vital Signs








  Date Time  Temp Pulse Resp B/P (MAP) Pulse Ox O2 Delivery O2 Flow Rate FiO2


 


12/10/17 13:03 99.3 108 16 102/57 (72) 95 BiPAP/CPAP  





 99.3       


 


12/10/17 10:52       15.0 











Labs


Lab





Laboratory Tests








Test


  12/10/17


10:57 12/10/17


11:02 12/10/17


11:09 12/10/17


11:35


 


O2 Saturation 90 % (92-99)    


 


Arterial Blood pH


  7.34


(7.35-7.45) 


  


  


 


 


Arterial Blood pCO2 at


Patient Temp 78 mmHg


(35-46) 


  


  


 


 


Arterial Blood pO2 at Patient


Temp 61 mmHg


() 


  


  


 


 


Arterial Blood HCO3


  41 mmol/L


(21-28) 


  


  


 


 


Arterial Blood Base Excess


  11 mmol/L


(-3-3) 


  


  


 


 


Oxyhemoglobin 87.3 %    


 


Methemoglobin


  0.5 %


(0.0-1.9) 


  


  


 


 


Carbon Monoxide, Quantitative


  2.6 %


(0.0-1.9) 


  


  


 


 


FiO2 45    


 


White Blood Count


  


  9.0 x10^3/uL


(4.0-11.0) 


  


 


 


Red Blood Count


  


  5.81 x10^6/uL


(4.30-5.70) 


  


 


 


Hemoglobin


  


  16.3 g/dL


(13.0-17.5) 


  


 


 


Hematocrit


  


  50.7 %


(39.0-53.0) 


  


 


 


Mean Corpuscular Volume  87 fL ()   


 


Mean Corpuscular Hemoglobin  28 pg (25-35)   


 


Mean Corpuscular Hemoglobin


Concent 


  32 g/dL


(31-37) 


  


 


 


Red Cell Distribution Width


  


  16.5 %


(11.5-14.5) 


  


 


 


Platelet Count


  


  216 x10^3/uL


(140-400) 


  


 


 


Neutrophils (%) (Auto)  84 % (31-73)   


 


Lymphocytes (%) (Auto)  9 % (24-48)   


 


Monocytes (%) (Auto)  6 % (0-9)   


 


Eosinophils (%) (Auto)  0 % (0-3)   


 


Basophils (%) (Auto)  0 % (0-3)   


 


Neutrophils # (Auto)


  


  7.6 x10^3uL


(1.8-7.7) 


  


 


 


Lymphocytes # (Auto)


  


  0.8 x10^3/uL


(1.0-4.8) 


  


 


 


Monocytes # (Auto)


  


  0.6 x10^3/uL


(0.0-1.1) 


  


 


 


Eosinophils # (Auto)


  


  0.0 x10^3/uL


(0.0-0.7) 


  


 


 


Basophils # (Auto)


  


  0.0 x10^3/uL


(0.0-0.2) 


  


 


 


Prothrombin Time


  


  13.1 SEC


(11.7-14.0) 


  


 


 


Prothromb Time International


Ratio 


  1.1 (0.8-1.1) 


  


  


 


 


Activated Partial


Thromboplast Time 


  27 SEC (24-38) 


  


  


 


 


Sodium Level


  


  134 mmol/L


(136-145) 


  


 


 


Potassium Level


  


  5.5 mmol/L


(3.5-5.1) 


  


 


 


Chloride Level


  


  92 mmol/L


() 


  


 


 


Carbon Dioxide Level


  


  36 mmol/L


(21-32) 


  


 


 


Anion Gap  6 (6-14)   


 


Blood Urea Nitrogen


  


  20 mg/dL


(8-26) 


  


 


 


Creatinine


  


  1.6 mg/dL


(0.7-1.3) 


  


 


 


Estimated GFR


(Cockcroft-Gault) 


  44.5 


  


  


 


 


BUN/Creatinine Ratio  13 (6-20)   


 


Glucose Level


  


  129 mg/dL


(70-99) 


  


 


 


Lactic Acid Level


  


  2.1 mmol/L


(0.4-2.0) 


  


 


 


Calcium Level


  


  8.6 mg/dL


(8.5-10.1) 


  


 


 


Magnesium Level


  


  2.1 mg/dL


(1.8-2.4) 


  


 


 


Total Bilirubin


  


  0.4 mg/dL


(0.2-1.0) 


  


 


 


Aspartate Amino Transf


(AST/SGOT) 


  15 U/L (15-37) 


  


  


 


 


Alanine Aminotransferase


(ALT/SGPT) 


  20 U/L (16-63) 


  


  


 


 


Alkaline Phosphatase


  


  103 U/L


() 


  


 


 


Ammonia


  


  < 10 mcmol/L


(11-34) 


  


 


 


Troponin I Quantitative


  


  0.232 ng/mL


(0.000-0.055) 


  


 


 


NT-Pro-B-Type Natriuretic


Peptide 


  2185 pg/mL


(0-124) 


  


 


 


Total Protein


  


  7.1 g/dL


(6.4-8.2) 


  


 


 


Albumin


  


  3.3 g/dL


(3.4-5.0) 


  


 


 


Albumin/Globulin Ratio  0.9 (1.0-1.7)   


 


Lipase


  


  108 U/L


() 


  


 


 


Thyroid Stimulating Hormone


(TSH) 


  0.152 uIU/mL


(0.358-3.74) 


  


 


 


Ethyl Alcohol Level


  


  < 10 mg/dL


(0-10) 


  


 


 


Influenza Type A Antigen


  


  


  Negative


(NEGATIVE) 


 


 


Influenza Type B Antigen


  


  


  Negative


(NEGATIVE) 


 


 


Urine Collection Type    U cath 


 


Urine Color    Yellow 


 


Urine Clarity    Cloudy 


 


Urine pH    6.0 


 


Urine Specific Gravity    1.015 


 


Urine Protein


  


  


  


  30 mg/dL


(NEG-TRACE)


 


Urine Glucose (UA)


  


  


  


  Negative mg/dL


(NEG)


 


Urine Ketones (Stick)


  


  


  


  Negative mg/dL


(NEG)


 


Urine Blood    Small (NEG) 


 


Urine Nitrite    Positive (NEG) 


 


Urine Bilirubin    Negative (NEG) 


 


Urine Urobilinogen Dipstick


  


  


  


  0.2 mg/dL (0.2


mg/dL)


 


Urine Leukocyte Esterase    Moderate (NEG) 


 


Urine RBC


  


  


  


  Rare /HPF


(0-2)


 


Urine WBC


  


  


  


  11-20 /HPF


(0-4)


 


Urine Squamous Epithelial


Cells 


  


  


  Occ /LPF 


 


 


Urine Bacteria


  


  


  


  Many /HPF


(0-FEW)


 


Urine Hyaline Casts    Few /HPF 


 


Urine Opiates Screen    Pos (NEG) 


 


Urine Methadone Screen    Neg (NEG) 


 


Urine Barbiturates    Neg (NEG) 


 


Urine Phencyclidine Screen    Neg (NEG) 


 


Urine


Amphetamine/Methamphetamine 


  


  


  Neg (NEG) 


 


 


Urine Benzodiazepines Screen    Neg (NEG) 


 


Urine Cocaine Screen    Neg (NEG) 


 


Urine Cannabinoids Screen    Neg (NEG) 


 


Urine Ethyl Alcohol    Neg (NEG) 


 


Test


  12/10/17


12:30 


  


  


 


 


Lactic Acid Level


  2.0 mmol/L


(0.4-2.0) 


  


  


 











Objective


Assessment


Encephalopathy


CO2 retention


COPD exacerbation


UTI


Lactic acidosis


ARF





Plan


Plan of Care


fluids


bipap


zyvox and zosyn


check cultures


supportive care











RENITA LOZA MD Dec 10, 2017 13:47

## 2017-12-10 NOTE — PHYS DOC
Past Medical History


Past Medical History:  Anxiety, COPD, Diabetes-Type II, GERD, Pneumonia, Seizure

, Other


Additional Past Medical Histor:  Bronchitis, RSD, back pain


Past Surgical History:  Other


Additional Past Surgical Histo:  PAIN PUMP, CYSTS REMOVED


Alcohol Use:  Rarely


Drug Use:  None





Adult General


Chief Complaint


Chief Complaint:  SHORTNESS OF BREATH





HPI


HPI





Patient is a 57  year old male presenting to the emergency department via EMS 

for evaluation of shortness of breath and altered mental status.  Looking 

through the charts it appears that he is a poorly functioning COPD patient with 

relatively frequent admissions recently for COPD. Patient is unable to provide 

any history as he is obtunded and he is on a fairly large amount of oxygen 

satting approximately 92% on 10 L.  He moves all of his extremities and mumbles 

but otherwise is not able to provide much meaningful information.





Review of Systems


Review of Systems


Unable to obtain due to medical condition





Current Medications


Current Medications





Current Medications








 Medications


  (Trade)  Dose


 Ordered  Sig/Garrick  Start Time


 Stop Time Status Last Admin


Dose Admin


 


 Fentanyl Citrate


  (Fentanyl 2ml


 Vial)  100 mcg  1X  ONCE  12/10/17 11:15


 12/10/17 11:36 DC  


 


 


 Nitroglycerin


  (Nitrostat)  0.4 mg  PRN Q5MIN  PRN  12/10/17 11:15


 12/10/17 11:36 DC  


 











Allergies


Allergies





Allergies








Coded Allergies Type Severity Reaction Last Updated Verified


 


  midazolam Allergy Severe Anaphylaxis 3/16/17 Yes


 


  paroxetine Allergy Severe seizures 3/16/17 Yes


 


  I S O L A T I O N *CONTACT* Allergy Unknown  3/16/17 Yes











Physical Exam


Physical Exam





Constitutional: Chronically ill-appearing male in moderate to severe 

respiratory distress


HENT: Normocephalic, atraumatic, bilateral external ears normal, oropharynx 

moist, no oral exudates, nose normal. []


Eyes: PERRLA, EOMI, conjunctiva normal, no discharge. [] 


Neck: Normal range of motion, no tenderness, supple, no stridor. [] 


Cardiovascular:Heart rate regular tachycardic with regular rhythm


Lungs & Thorax:  Bilateral breath sounds diminished with no wheezing auscultated


Abdomen: Bowel sounds normal, soft, no tenderness, no masses, no pulsatile 

masses. [] 


Skin: Warm, dry, no erythema, no rash. [] 


Back: No tenderness, no CVA tenderness. [] 


Extremities: No tenderness, no cyanosis, no clubbing, ROM intact, no edema. [] 


Neurologic: Alert and oriented X 0, moves all extremities





Current Patient Data


Vital Signs





 Vital Signs








  Date Time  Temp Pulse Resp B/P (MAP) Pulse Ox O2 Delivery O2 Flow Rate FiO2


 


12/10/17 11:24     95 BiPAP/CPAP  


 


12/10/17 10:52 99.6 120 20 120/73 (89)   15.0 





 99.6       








Lab Values





 Laboratory Tests








Test


  12/10/17


10:57 12/10/17


11:02 12/10/17


11:09


 


O2 Saturation 90 % (92-99)  L  


 


Arterial Blood pH


  7.34


(7.35-7.45)  L 


  


 


 


Arterial Blood pCO2 at


Patient Temp 78 mmHg


(35-46)  *H 


  


 


 


Arterial Blood pO2 at Patient


Temp 61 mmHg


()  L 


  


 


 


Arterial Blood HCO3


  41 mmol/L


(21-28)  H 


  


 


 


Arterial Blood Base Excess


  11 mmol/L


(-3-3)  H 


  


 


 


Oxyhemoglobin 87.3 %    


 


Methemoglobin


  0.5 %


(0.0-1.9) 


  


 


 


Carbon Monoxide, Quantitative


  2.6 %


(0.0-1.9)  H 


  


 


 


FiO2 45    


 


White Blood Count


  


  9.0 x10^3/uL


(4.0-11.0) 


 


 


Red Blood Count


  


  5.81 x10^6/uL


(4.30-5.70)  H 


 


 


Hemoglobin


  


  16.3 g/dL


(13.0-17.5) 


 


 


Hematocrit


  


  50.7 %


(39.0-53.0) 


 


 


Mean Corpuscular Volume


  


  87 fL ()


  


 


 


Mean Corpuscular Hemoglobin  28 pg (25-35)   


 


Mean Corpuscular Hemoglobin


Concent 


  32 g/dL


(31-37) 


 


 


Red Cell Distribution Width


  


  16.5 %


(11.5-14.5)  H 


 


 


Platelet Count


  


  216 x10^3/uL


(140-400) 


 


 


Neutrophils (%) (Auto)  84 % (31-73)  H 


 


Lymphocytes (%) (Auto)  9 % (24-48)  L 


 


Monocytes (%) (Auto)  6 % (0-9)   


 


Eosinophils (%) (Auto)  0 % (0-3)   


 


Basophils (%) (Auto)  0 % (0-3)   


 


Neutrophils # (Auto)


  


  7.6 x10^3uL


(1.8-7.7) 


 


 


Lymphocytes # (Auto)


  


  0.8 x10^3/uL


(1.0-4.8)  L 


 


 


Monocytes # (Auto)


  


  0.6 x10^3/uL


(0.0-1.1) 


 


 


Eosinophils # (Auto)


  


  0.0 x10^3/uL


(0.0-0.7) 


 


 


Basophils # (Auto)


  


  0.0 x10^3/uL


(0.0-0.2) 


 


 


Prothrombin Time


  


  13.1 SEC


(11.7-14.0) 


 


 


Prothrombin Time INR  1.1 (0.8-1.1)   


 


PTT


  


  27 SEC (24-38)


  


 


 


Sodium Level


  


  134 mmol/L


(136-145)  L 


 


 


Potassium Level


  


  5.5 mmol/L


(3.5-5.1)  H 


 


 


Chloride Level


  


  92 mmol/L


()  L 


 


 


Carbon Dioxide Level


  


  36 mmol/L


(21-32)  H 


 


 


Anion Gap  6 (6-14)   


 


Blood Urea Nitrogen


  


  20 mg/dL


(8-26) 


 


 


Creatinine


  


  1.6 mg/dL


(0.7-1.3)  H 


 


 


Estimated GFR


(Cockcroft-Gault) 


  44.5  


  


 


 


BUN/Creatinine Ratio  13 (6-20)   


 


Glucose Level


  


  129 mg/dL


(70-99)  H 


 


 


Lactic Acid Level


  


  2.1 mmol/L


(0.4-2.0)  H 


 


 


Calcium Level


  


  8.6 mg/dL


(8.5-10.1) 


 


 


Magnesium Level


  


  2.1 mg/dL


(1.8-2.4) 


 


 


Total Bilirubin


  


  0.4 mg/dL


(0.2-1.0) 


 


 


Aspartate Amino Transferase


(AST) 


  15 U/L (15-37)


  


 


 


Alanine Aminotransferase (ALT)


  


  20 U/L (16-63)


  


 


 


Alkaline Phosphatase


  


  103 U/L


() 


 


 


Ammonia


  


  < 10 mcmol/L


(11-34)  L 


 


 


Troponin I Quantitative


  


  0.232 ng/mL


(0.000-0.055) 


 


 


NT-Pro-B-Type Natriuretic


Peptide 


  2185 pg/mL


(0-124)  H 


 


 


Total Protein


  


  7.1 g/dL


(6.4-8.2) 


 


 


Albumin


  


  3.3 g/dL


(3.4-5.0)  L 


 


 


Albumin/Globulin Ratio


  


  0.9 (1.0-1.7)


L 


 


 


Lipase


  


  108 U/L


() 


 


 


Thyroid Stimulating Hormone


(TSH) 


  0.152 uIU/mL


(0.358-3.74)  L 


 


 


Ethyl Alcohol Level


  


  < 10 mg/dL


(0-10) 


 


 


Influenza Type A Antigen


  


  


  Negative


(NEGATIVE)


 


Influenza Type B Antigen


  


  


  Negative


(NEGATIVE)





 Laboratory Tests


12/10/17 11:02








 Laboratory Tests


12/10/17 11:02














EKG


EKG


Sinus tachycardia at 120 beats per minutes with normal axis no obvious ST 

elevation or depression with flattened T waves in leads V3 and V4. Interpreted 

at 1102 by Dr. Edge.





Radiology/Procedures


Radiology/Procedures


AP chest.





History: Short of air





AP view was taken of the chest. There is an old right rib fracture. Heart is


normal in size. There is mild left lung base atelectasis or infiltrate. A


lateral view could be of benefit. Heart is normal in size. The patient is


rotated to the left.





Impression:


1. Mild hazy left base atelectasis or infiltrate. PA and lateral views would


be of benefit.














DICTATED and SIGNED BY:     TAMRA SAUNDERS MD


DATE:     12/10/17 1113





Course & Med Decision Making


Course & Med Decision Making


Patient presenting to the emergency department quite ill and he is in CO2 

narcosis that he was placed on BiPAP emergently as well as given breathing 

treatments.  He did improve and was able to wake up and answer some questions 

and said that he is not having any chest pain nausea vomiting or diaphoresis.  

His EKG does not show any obvious acute ischemic changes with no obvious STEMI 

but his troponin and BNP are elevated.  This could be from demand ischemia from 

his hypoxia however other considerations had to be in entertained such as 

pulmonary and was observed he will get a CT angiogram.  He did have possible 

infiltrate on chest x-ray although infection seems less likely to me but he 

will get a dose of Zosyn.  Patient will receive one dose of Lovenox addition to 

1 L of fluids aspirin and stay on BiPAP and go to the ICU for now.  Patient 

will be admitted to the ICU in critical condition with Dr. domínguez accepting 

admission and cardiology and pulmonology are consulted.





Critical care time of 40 minutes.





Dragon Disclaimer


Dragon Disclaimer


This electronic medical record was generated, in whole or in part, using a 

voice recognition dictation system.





Departure


Departure


Impression:  


 Primary Impression:  


 Acute on chronic respiratory failure


 Additional Impressions:  


 CO2 narcosis


 Elevated brain natriuretic peptide (BNP) level


 Elevated troponin


 Hypoxia


 UTI (urinary tract infection)


Disposition:  09 ADMITTED AS INPATIENT


Admitting Physician:  Marky Ahumada


Condition:  CRITICAL


Referrals:  


MARKY AHUMADA MD (PCP)





Problem Qualifiers








 Primary Impression:  


 Acute on chronic respiratory failure


 Respiratory failure complication:  hypoxia and hypercapnia  Qualified Codes:  

J96.21 - Acute and chronic respiratory failure with hypoxia; J96.22 - Acute and 

chronic respiratory failure with hypercapnia








MARKY EDGE DO Dec 10, 2017 12:14

## 2017-12-11 VITALS — DIASTOLIC BLOOD PRESSURE: 79 MMHG | SYSTOLIC BLOOD PRESSURE: 140 MMHG

## 2017-12-11 VITALS — DIASTOLIC BLOOD PRESSURE: 76 MMHG | SYSTOLIC BLOOD PRESSURE: 124 MMHG

## 2017-12-11 VITALS — DIASTOLIC BLOOD PRESSURE: 72 MMHG | SYSTOLIC BLOOD PRESSURE: 120 MMHG

## 2017-12-11 VITALS — SYSTOLIC BLOOD PRESSURE: 145 MMHG | DIASTOLIC BLOOD PRESSURE: 80 MMHG

## 2017-12-11 VITALS — SYSTOLIC BLOOD PRESSURE: 134 MMHG | DIASTOLIC BLOOD PRESSURE: 77 MMHG

## 2017-12-11 VITALS — SYSTOLIC BLOOD PRESSURE: 114 MMHG | DIASTOLIC BLOOD PRESSURE: 66 MMHG

## 2017-12-11 VITALS — DIASTOLIC BLOOD PRESSURE: 68 MMHG | SYSTOLIC BLOOD PRESSURE: 119 MMHG

## 2017-12-11 VITALS — SYSTOLIC BLOOD PRESSURE: 143 MMHG | DIASTOLIC BLOOD PRESSURE: 77 MMHG

## 2017-12-11 VITALS — SYSTOLIC BLOOD PRESSURE: 124 MMHG | DIASTOLIC BLOOD PRESSURE: 76 MMHG

## 2017-12-11 VITALS — DIASTOLIC BLOOD PRESSURE: 80 MMHG | SYSTOLIC BLOOD PRESSURE: 140 MMHG

## 2017-12-11 VITALS — SYSTOLIC BLOOD PRESSURE: 157 MMHG | DIASTOLIC BLOOD PRESSURE: 80 MMHG

## 2017-12-11 VITALS — DIASTOLIC BLOOD PRESSURE: 77 MMHG | SYSTOLIC BLOOD PRESSURE: 115 MMHG

## 2017-12-11 VITALS — SYSTOLIC BLOOD PRESSURE: 132 MMHG | DIASTOLIC BLOOD PRESSURE: 75 MMHG

## 2017-12-11 VITALS — DIASTOLIC BLOOD PRESSURE: 81 MMHG | SYSTOLIC BLOOD PRESSURE: 111 MMHG

## 2017-12-11 VITALS — DIASTOLIC BLOOD PRESSURE: 76 MMHG | SYSTOLIC BLOOD PRESSURE: 118 MMHG

## 2017-12-11 VITALS — SYSTOLIC BLOOD PRESSURE: 112 MMHG | DIASTOLIC BLOOD PRESSURE: 78 MMHG

## 2017-12-11 VITALS — SYSTOLIC BLOOD PRESSURE: 117 MMHG | DIASTOLIC BLOOD PRESSURE: 73 MMHG

## 2017-12-11 VITALS — SYSTOLIC BLOOD PRESSURE: 133 MMHG | DIASTOLIC BLOOD PRESSURE: 77 MMHG

## 2017-12-11 VITALS — SYSTOLIC BLOOD PRESSURE: 128 MMHG | DIASTOLIC BLOOD PRESSURE: 79 MMHG

## 2017-12-11 VITALS — DIASTOLIC BLOOD PRESSURE: 79 MMHG | SYSTOLIC BLOOD PRESSURE: 145 MMHG

## 2017-12-11 VITALS — DIASTOLIC BLOOD PRESSURE: 72 MMHG | SYSTOLIC BLOOD PRESSURE: 119 MMHG

## 2017-12-11 LAB
ANION GAP SERPL CALC-SCNC: 7 MMOL/L (ref 6–14)
BASOPHILS # BLD AUTO: 0 X10^3/UL (ref 0–0.2)
BASOPHILS NFR BLD: 0 % (ref 0–3)
BUN SERPL-MCNC: 19 MG/DL (ref 8–26)
CALCIUM SERPL-MCNC: 8.6 MG/DL (ref 8.5–10.1)
CHLORIDE SERPL-SCNC: 94 MMOL/L (ref 98–107)
CHOLEST SERPL-MCNC: 144 MG/DL (ref 0–200)
CHOLEST/HDLC SERPL: 3.1 {RATIO}
CO2 SERPL-SCNC: 36 MMOL/L (ref 21–32)
CREAT SERPL-MCNC: 1.2 MG/DL (ref 0.7–1.3)
EOSINOPHIL NFR BLD: 0 % (ref 0–3)
ERYTHROCYTE [DISTWIDTH] IN BLOOD BY AUTOMATED COUNT: 16.3 % (ref 11.5–14.5)
GFR SERPLBLD BASED ON 1.73 SQ M-ARVRAT: 62 ML/MIN
GLUCOSE SERPL-MCNC: 169 MG/DL (ref 70–99)
HCO3 BLDA-SCNC: 40 MMOL/L (ref 21–28)
HCT VFR BLD CALC: 48.4 % (ref 39–53)
HDLC SERPL-MCNC: 46 MG/DL (ref 40–60)
HGB BLD-MCNC: 15.7 G/DL (ref 13–17.5)
INSPIRATION SETTING TIME VENT: 40
LYMPHOCYTES # BLD: 1.3 X10^3/UL (ref 1–4.8)
LYMPHOCYTES NFR BLD AUTO: 22 % (ref 24–48)
MAGNESIUM SERPL-MCNC: 2.1 MG/DL (ref 1.8–2.4)
MCH RBC QN AUTO: 28 PG (ref 25–35)
MCHC RBC AUTO-ENTMCNC: 32 G/DL (ref 31–37)
MCV RBC AUTO: 88 FL (ref 79–100)
MONOCYTES NFR BLD: 8 % (ref 0–9)
NEUTROPHILS NFR BLD AUTO: 70 % (ref 31–73)
NONHDLC SERPL-MCNC: 98 MG/DL (ref 0–129)
PCO2 BLDA: 71 MMHG (ref 35–46)
PH ABG: 7.37 (ref 7.35–7.45)
PLATELET # BLD AUTO: 153 X10^3/UL (ref 140–400)
PO2 BLDA: 66 MMHG (ref 65–108)
POTASSIUM SERPL-SCNC: 4.1 MMOL/L (ref 3.5–5.1)
RBC # BLD AUTO: 5.53 X10^6/UL (ref 4.3–5.7)
SAO2 % BLDA: 91 % (ref 92–99)
SODIUM SERPL-SCNC: 137 MMOL/L (ref 136–145)
TRIGL SERPL-MCNC: 93 MG/DL (ref 0–150)
WBC # BLD AUTO: 5.9 X10^3/UL (ref 4–11)

## 2017-12-11 PROCEDURE — 5A09357 ASSISTANCE WITH RESPIRATORY VENTILATION, LESS THAN 24 CONSECUTIVE HOURS, CONTINUOUS POSITIVE AIRWAY PRESSURE: ICD-10-PCS | Performed by: FAMILY MEDICINE

## 2017-12-11 RX ADMIN — IPRATROPIUM BROMIDE AND ALBUTEROL SULFATE SCH ML: .5; 3 SOLUTION RESPIRATORY (INHALATION) at 15:08

## 2017-12-11 RX ADMIN — PIPERACILLIN SODIUM AND TAZOBACTAM SODIUM SCH GM: 3; .375 INJECTION, POWDER, FOR SOLUTION INTRAVENOUS at 16:42

## 2017-12-11 RX ADMIN — PIPERACILLIN SODIUM AND TAZOBACTAM SODIUM SCH GM: 3; .375 INJECTION, POWDER, FOR SOLUTION INTRAVENOUS at 11:36

## 2017-12-11 RX ADMIN — PIPERACILLIN SODIUM AND TAZOBACTAM SODIUM SCH GM: 3; .375 INJECTION, POWDER, FOR SOLUTION INTRAVENOUS at 05:04

## 2017-12-11 RX ADMIN — NICOTINE SCH PATCH: 21 PATCH, EXTENDED RELEASE TOPICAL at 16:42

## 2017-12-11 RX ADMIN — IPRATROPIUM BROMIDE AND ALBUTEROL SULFATE SCH ML: .5; 3 SOLUTION RESPIRATORY (INHALATION) at 11:25

## 2017-12-11 RX ADMIN — IPRATROPIUM BROMIDE AND ALBUTEROL SULFATE SCH ML: .5; 3 SOLUTION RESPIRATORY (INHALATION) at 08:05

## 2017-12-11 RX ADMIN — METHYLPREDNISOLONE SODIUM SUCCINATE SCH MG: 40 INJECTION, POWDER, FOR SOLUTION INTRAMUSCULAR; INTRAVENOUS at 11:37

## 2017-12-11 RX ADMIN — GLYCERIN, ISOLEUCINE, LEUCINE, LYSINE, METHIONINE, PHENYLALANINE, THREONINE, TRYPTOPHAN, VALINE, ALANINE, GLYCINE, ARGININE, HISTIDINE, PROLINE, SERINE, CYSTEINE, SODIUM ACETATE, MAGNESIUM ACETATE, CALCIUM ACETATE, SODIUM CHLORIDE, POTASSIUM CHLORIDE, PHOSPHORIC ACID, AND POTASSIUM METABISULFITE SCH MLS/HR
3; .21; .27; .22; .16; .17; .12; .046; .2; .21; .42; .29; .085; .34; .18; .014; .2; .054; .026; .12; .15; .041 INJECTION INTRAVENOUS at 11:37

## 2017-12-11 RX ADMIN — IPRATROPIUM BROMIDE AND ALBUTEROL SULFATE SCH ML: .5; 3 SOLUTION RESPIRATORY (INHALATION) at 19:23

## 2017-12-11 NOTE — CONS
DATE OF CONSULTATION:  



ATTENDING PHYSICIAN:  Dr. Marky Shelton.



REASON FOR CONSULTATION:  Respiratory failure.



HISTORY OF PRESENT ILLNESS:.  The patient is known to me from his previous

admissions.  He is a 57-year-old male with history of chronic obstructive

pulmonary disease and chronic respiratory failure.  He also has chronic narcotic

dependence and has a pain pump.  The patient presented to the hospital with

complaint of increasing shortness of air and altered mental status.  He also has

some cough as well.  He was minimally responsive.  He is a DNR.  He was noted to

have lactic acid of 2.1.  Urinalysis was suggestive of infection.  His chest

x-ray was reviewed, and it shows a possible left basal atelectasis or

infiltrate.  He also had a CT chest on 11/27/2017.  At that time, he had a

stable 2-4 mm pulmonary nodule in the lower lobe.  Also had some basal

bronchiectasis and atelectasis.  The patient's arterial blood gases were

abnormal with a pH of 7.34, pCO2 of 70 and a pO2 of 61 with a bicarbonate of 41

on 45% FiO2, and he was placed on BiPAP, and his latest blood gases showed a pH

of 7.37, pCO2 of 71 and a pO2 of 66 on 40% FiO2.  His is able to follow commands

while on BiPAP.



PAST MEDICAL HISTORY:  History of chronic respiratory failure.  History of COPD,

history of chronic hypercapnia, on home BiPAP with noncompliance.  History of

respiratory tract infection with Pseudomonas aeruginosa, Klebsiella.  History of

UTI with E. coli.  History of MRSA, respiratory tract infections.  History of

sleep apnea, probably central secondary to narcotics.  History of chronic pain

pump.



PAST SURGICAL HISTORY:  Pain pump implantation and cyst removal.



SOCIAL HISTORY:  , lives at home, has a long history of tobacco use.



FAMILY HISTORY:  Positive for strokes, diabetes, asthma and COPD.



ALLERGIES:  MIDAZOLAM AND PAROXETINE.



MEDICATIONS:  Reviewed including IV steroids and Zosyn.



REVIEW OF SYSTEMS:  Limited as the patient is on the BiPAP, but pertinent

positive discussed in my history of present illness.



PHYSICAL EXAMINATION: 

GENERAL:  He follows commands and opens his eyes while on BiPAP.

VITAL SIGNS:  Blood pressure 124/76, pulse ox 96% on FiO2 40% BiPAP.  Afebrile.

HEENT:  Sclerae nonicteric.

NECK:  Supple.

LUNGS:  Diminished breath sounds.

CARDIOVASCULAR:  Regular rate and rhythm.

ABDOMEN:  Soft, nontender.

EXTREMITIES:  With trace pitting edema.



LABORATORY DATA:  Reviewed.  Toxicology screen was positive for opiates.  BUN

19, creatinine 1.2.  Troponin 1.25.  White cell count 5.9, hemoglobin 15.7,

platelets are 153.



IMPRESSION:

1.  Acute on chronic hypercapnic respiratory failure secondary to acute toxic

encephalopathy and chronic obstructive pulmonary disease exacerbation.

2.  Possible left lower lobe infiltrate versus atelectasis.

3.  History of stable basal lung nodules, to be followed up at the office with

Dr. Voss.

4.  Urinary tract infection, present on admission.

5.  Increased troponin, present on admission.



RECOMMENDATION:

1.  Continue with present BiPAP, and we will try Venturi mask later.  BiPAP at

bedtime.

2.  Continue present bronchodilators.

3.  Steroids.

4.  Follow urine cultures.

5.  Follow chest x-ray as needed.

6.  Follow outpatient CT chest regarding lung nodule.

7.  Continue bronchodilators.

8.  Continue antibiotic per Infectious Disease.

9.  Discussed with RN and also questions asked from the wife over the phone.



Critical care time 39 minutes.

 



______________________________

TESSA ACEVEDO MD



DR:  [email or web address]/duane  JOB#:  [id number] / [id number]

DD:  12/11/2017 10:49  DT:  12/11/2017 11:14

## 2017-12-11 NOTE — PDOC2
SCOTT PEREZ 12/11/17 1200:


CARDIAC CONSULT


DATE OF CONSULT


Date of Consult


DATE: 12/11/17 


TIME: 11:40





REASON FOR CONSULT


Reason for Consult:


elevated trop





HISTORY OF PRESENT ILLNESS


HISTORY OF PRESENT ILLNESS


Mr Mann is a 57 year old male with longstanding history of COPD, who was 

admitted to the ED with decreased mental status and dyspnea.  He was found to 

be in hypercapnic respiratory failure and placed on BiPap.  troponin was noted 

to be elevated so consult was called.  He remains on BiPap.  History is 

obtained from the chart.





PAST MEDICAL HISTORY


Past Medical History


 chronic respiratory failure, COPD with chronic hypercapnia, medical 

noncompliance, Pseudomonas aeruginosa, Klebsiella, UTI with E. coli,   MRSA, 

respiratory tract infections, sleep apnea, chronic narcotics with prior overdose

,  chronic back pain with pump, BPH, diabetes, seizures, GERD, anxiety





PAST SURGICAL HISTORY


Past Surgical History


 Pain pump implantation and cyst removal.





FAMILY HISTORY


Family History


strokes, diabetes, asthma and COPD.





SOCIAL HISTORY


Social History


, lives at home, + tobaccoism





CURRENT MEDICATIONS


CURRENT MEDICATIONS





Current Medications








 Medications


  (Trade)  Dose


 Ordered  Sig/Garrick


 Route


 PRN Reason  Start Time


 Stop Time Status Last Admin


Dose Admin


 


 Piperacillin Sod/


 Tazobactam Sod


  (Zosyn)  3.375 gm  1X  ONCE


 IVP


   12/10/17 11:45


 12/10/17 11:46 DC 12/10/17 11:44


 


 


 Aspirin


  (Ecotrin)  325 mg  1X  ONCE


 PO


   12/10/17 12:15


 12/10/17 12:17 DC 12/10/17 12:27


 


 


 Sodium Chloride  1,000 ml @ 


 1,000 mls/hr  1X  ONCE


 IV


   12/10/17 12:30


 12/10/17 13:29 DC 12/10/17 12:27


 


 


 Enoxaparin Sodium


  (Lovenox 60mg


 Syringe)  60 mg  1X  ONCE


 SQ


   12/10/17 12:30


 12/10/17 12:31 DC 12/10/17 12:27


 


 


 Albuterol/


 Ipratropium


  (Duoneb)  3 ml  RTQID


 NEB


   12/10/17 16:00


    12/11/17 11:25


 


 


 Linezolid  300 ml @ 


 300 mls/hr  Q12HR


 IV


   12/10/17 15:00


    12/11/17 07:44


 


 


 Piperacillin Sod/


 Tazobactam Sod


  (Zosyn)  3.375 gm  Q6HRS


 IVP


   12/10/17 15:00


    12/11/17 11:36


 


 


 Levetiracetam 500


 mg/Sodium Chloride  105 ml @ 


 420 mls/hr  Q12HR


 IV


   12/10/17 14:30


    12/11/17 07:44


 


 


 Lorazepam


  (Ativan)  0.5 mg  PRN Q4HRS  PRN


 IV


 ANXIETY / AGITATION  12/10/17 14:30


    12/10/17 17:56


 


 


 Haloperidol


 Lactate


  (Haldol)  5 mg  PRN Q6HRS  PRN


 IVP


 AGITATION  12/10/17 21:00


    12/10/17 21:12


 


 


 Methylprednisolone


 Sodium Succinate


  (SOLU-Medrol


 40MG VIAL)  40 mg  Q12HR


 IV


   12/11/17 09:00


    12/11/17 11:37


 


 


 Famotidine


  (Pepcid Vial)  40 mg  DAILY08


 IVP


   12/11/17 09:00


    12/11/17 11:37


 


 


 Amino Acids/


 Glycerin/


 Electrolytes  1,000 ml @ 


 80 mls/hr  [id number]


 IV


   12/11/17 09:00


    12/11/17 11:37


 











ALLERGIES


ALLERGIES:  


Coded Allergies:  


     midazolam (Verified  Allergy, Severe, Anaphylaxis, 3/16/17)


 TOLERATES CLONAZEPAM


     paroxetine (Verified  Allergy, Severe, seizures, 3/16/17)


     I S O L A T I O N *CONTACT* (Verified  Allergy, Unknown, 3/16/17)


 mrsa +





PHYSICAL EXAM


General:  Other (on BiPap, follows commands, opens eyes)


HEENT:  Other (no JVD/HJR)


Lungs:  Other (decreased)


Heart:  Regular rate, Normal S1, Normal S2


Abdomen:  Normal bowel sounds, Soft


Extremities:  Normal pulses, Other (trace edema)


Psych/Mental Status:  Other (follows commands)





VITALS


VITALS





Vital Signs








  Date Time  Temp Pulse Resp B/P (MAP) Pulse Ox O2 Delivery O2 Flow Rate FiO2


 


12/11/17 11:25     93 BiPAP/CPAP  


 


12/11/17 06:00  75 18 124/76 (92)    


 


12/11/17 04:00 98.5       





 98.5       


 


12/10/17 16:47       15.0 











LABS


Lab:





Laboratory Tests








Test


  12/10/17


12:30 12/10/17


14:00 12/10/17


18:05 12/10/17


20:39


 


Lactic Acid Level


  2.0 mmol/L


(0.4-2.0) 2.1 mmol/L


(0.4-2.0) 


  


 


 


Troponin I Quantitative


  


  


  1.253 ng/mL


(0.000-0.055) 


 


 


O2 Saturation    93 % (92-99) 


 


Arterial Blood pH


  


  


  


  7.38


(7.35-7.45)


 


Arterial Blood pCO2 at


Patient Temp 


  


  


  59 mmHg


(35-46)


 


Arterial Blood pO2 at Patient


Temp 


  


  


  68 mmHg


()


 


Arterial Blood HCO3


  


  


  


  33 mmol/L


(21-28)


 


Arterial Blood Base Excess


  


  


  


  6 mmol/L


(-3-3)


 


FiO2    40 


 


Test


  12/11/17


00:25 12/11/17


04:55 12/11/17


07:45 


 


 


Troponin I Quantitative


  1.004 ng/mL


(0.000-0.055) 


  


  


 


 


White Blood Count


  


  5.9 x10^3/uL


(4.0-11.0) 


  


 


 


Red Blood Count


  


  5.53 x10^6/uL


(4.30-5.70) 


  


 


 


Hemoglobin


  


  15.7 g/dL


(13.0-17.5) 


  


 


 


Hematocrit


  


  48.4 %


(39.0-53.0) 


  


 


 


Mean Corpuscular Volume  88 fL ()   


 


Mean Corpuscular Hemoglobin  28 pg (25-35)   


 


Mean Corpuscular Hemoglobin


Concent 


  32 g/dL


(31-37) 


  


 


 


Red Cell Distribution Width


  


  16.3 %


(11.5-14.5) 


  


 


 


Platelet Count


  


  153 x10^3/uL


(140-400) 


  


 


 


Neutrophils (%) (Auto)  70 % (31-73)   


 


Lymphocytes (%) (Auto)  22 % (24-48)   


 


Monocytes (%) (Auto)  8 % (0-9)   


 


Eosinophils (%) (Auto)  0 % (0-3)   


 


Basophils (%) (Auto)  0 % (0-3)   


 


Neutrophils # (Auto)


  


  4.1 x10^3uL


(1.8-7.7) 


  


 


 


Lymphocytes # (Auto)


  


  1.3 x10^3/uL


(1.0-4.8) 


  


 


 


Monocytes # (Auto)


  


  0.5 x10^3/uL


(0.0-1.1) 


  


 


 


Eosinophils # (Auto)


  


  0.0 x10^3/uL


(0.0-0.7) 


  


 


 


Basophils # (Auto)


  


  0.0 x10^3/uL


(0.0-0.2) 


  


 


 


Sodium Level


  


  137 mmol/L


(136-145) 


  


 


 


Potassium Level


  


  4.1 mmol/L


(3.5-5.1) 


  


 


 


Chloride Level


  


  94 mmol/L


() 


  


 


 


Carbon Dioxide Level


  


  36 mmol/L


(21-32) 


  


 


 


Anion Gap  7 (6-14)   


 


Blood Urea Nitrogen


  


  19 mg/dL


(8-26) 


  


 


 


Creatinine


  


  1.2 mg/dL


(0.7-1.3) 


  


 


 


Estimated GFR


(Cockcroft-Gault) 


  62.0 


  


  


 


 


Glucose Level


  


  169 mg/dL


(70-99) 


  


 


 


Calcium Level


  


  8.6 mg/dL


(8.5-10.1) 


  


 


 


Magnesium Level


  


  2.1 mg/dL


(1.8-2.4) 


  


 


 


O2 Saturation   91 % (92-99)  


 


Arterial Blood pH


  


  


  7.37


(7.35-7.45) 


 


 


Arterial Blood pCO2 at


Patient Temp 


  


  71 mmHg


(35-46) 


 


 


Arterial Blood pO2 at Patient


Temp 


  


  66 mmHg


() 


 


 


Arterial Blood HCO3


  


  


  40 mmol/L


(21-28) 


 


 


Arterial Blood Base Excess


  


  


  12 mmol/L


(-3-3) 


 


 


FiO2   40  











IMAGES


IMAGES


CXR - Impression:


1. Mild hazy left base atelectasis or infiltrate. PA and lateral views would


be of benefit.





EKG


EKG


sinus tachy, rightward axis, non specific abn





ECHOCARDIOGRAM


ECHOCARDIOGRAM


pending





ASSESSMENT/PLAN


ASSESSMENT/PLAN


1.  elevated trop consistent with NSTEMI - likely demand mediated.  Supportive 

care and will consider ischemic workup, MPI vs cath, outpatient once 

respiratory stable.  Await echo.  check lipids. Continue aspirin.  No beta 

blockers secondary to COPD. 


2.  acute respiratory failure, hypercapnic - mgmt per Pulmonary


3.  Lactic acidosis / UTI - per ID


4.  ANUP with hyperkalemia - repeat labs pending


Problems:  





JAVIER VIERA MD 12/11/17 2001:


CARDIAC CONSULT


ALLERGIES


ALLERGIES:  


Coded Allergies:  


     midazolam (Verified  Allergy, Severe, Anaphylaxis, 3/16/17)


 TOLERATES CLONAZEPAM


     paroxetine (Verified  Allergy, Severe, seizures, 3/16/17)


     I S O L A T I O N *CONTACT* (Verified  Allergy, Unknown, 3/16/17)


 mrsa +





ASSESSMENT/PLAN


ASSESSMENT/PLAN


Patient seen and examined.  Agree with NP's assessment and plan.


Acute resp failure probably secondary to acute COPD exacerbation - treat per 

Pulmonary team


NSTEMI most probably type 2/demand ischemia


2D echo showed LVEF 50-55%


We will consider ischemic evaluation in the form of stress test once active 

issues resolve - could be done as outpatient


Thank you for your consultation.


Problems:  











SCOTT PEREZ Dec 11, 2017 12:00


JAVIER VIERA MD Dec 11, 2017 20:01

## 2017-12-11 NOTE — CONS
DATE OF CONSULTATION:  12/10/2017



DICTATED BY:  This is Nas Jeffries, Nurse Practitioner, dictating for Dr. Jarvis Rosenbaum, Infectious Disease.



REFERRING PHYSICIAN:  Dr. Darnell.



CONSULTING PHYSICIAN:  Dr. Wellington.



REASON FOR CONSULTATION:  UTI.



HISTORY OF PRESENT ILLNESS:  This patient is a 57-year-old  male with a

history of chronic obstructive pulmonary disease.  He was brought to the

Emergency Room with altered mental status change and shortness of air.  He was

in acute respiratory failure with a pCO2 of 78, pO2 61, requiring BiPAP.  He is

minimally responsive, unable to provide history of present illness, past medical

history or review of systems.  There is no family present at this time.  There

has been no fever reported and his white blood cell count is within normal

limits.  Lactic acid was slightly elevated at 2.1.  Urinalysis was suggestive of

infection.  He received a one-time dose of Zosyn in the ER.  ID has been asked

to consult for further evaluation and antibiotic management.



PAST MEDICAL HISTORY:  History of urinary tract infection with Klebsiella

pneumoniae resistant to ampicillin, otherwise sensitive.  Respiratory infection

with Pseudomonas aeruginosa, pansensitive and Klebsiella pneumoniae resistant to

ampicillin and intermittent piperacillin, otherwise sensitive.  Urinary tract

infection with Escherichia coli as well.  Respiratory infections with MRSA and

Achromobacter as well.  COPD, oxygen dependency; diabetes; gastroesophageal

reflux disease; anxiety; seizures; RSD; chronic back pain; sleep apnea; benign

prostatic hyperplasia; urinary retention and history of deep venous thrombosis.



PAST SURGICAL HISTORY:  Cyst removal, pain pump implantation.



SOCIAL HISTORY:  He is  and lives at home.



FAMILY HISTORY:  Positive for stroke, diabetes mellitus, asthma, chronic

obstructive pulmonary disease and congestive heart failure.



ALLERGIES:  No allergies to antibiotics listed.



MEDICATIONS:  One-time dose of Zosyn in the ER.  Other medications are available

and have been reviewed on the MAR.



REVIEW OF SYSTEMS:  Unobtainable as the patient is minimally responsive.



PHYSICAL EXAMINATION:

GENERAL:   male, on BiPAP, no apparent distress.

VITAL SIGNS:  Afebrile.  Blood pressure 102/57, heart rate 108, respiratory rate

16 and pulse oximetry is 95% on BiPAP.  Weight is 129 pounds.  BMI 20.

HEENT:  Pupils equally round and reactive.  Normal conjunctivae.  Oral mucosa is

dry.

LUNGS:  Clear to auscultation.

HEART:  Normal S1 and S2.  Irregular rhythm.

ABDOMEN:  Nondistended.  Bowel sounds active.  Soft.  No grimace or guarding to

palpation.

EXTREMITIES:  No gross edema or cyanosis.

SKIN:  Without rash.

NEUROLOGIC:  Briefly opens eyes to gentle tactile stimulation.  He does not

follow commands or answer questions.



LABORATORY DATA:  Today's WBC 9.0, hemoglobin 16.3 and platelet count 216,000. 

Sodium 134, potassium 5.5, creatinine 1.6, BUN 20, bicarbonate 36 and glucose

129.  BNP 2185.  Albumin 3.3.  Lactic acid 2.0 from 2.1 on admission.  Ammonia

less than 10.  Troponin 0.232.  TSH 0.152.  Urine toxicology positive for

opiates.  Urinalysis per HPI.  Urine and blood cultures pending.  Influenza

screen negative.  MRSA screen positive.  Chest x-ray reveals mild hazy left base

atelectasis or infiltrate.



IMPRESSION:

1.  Acute encephalopathy.

2.  CO2 retention.

3.  Acute exacerbation of chronic obstructive pulmonary disease.

4.  Urinary tract infection present on admission.

5.  Lactic acidosis.

6.  Acute kidney injury.



PLAN:  Maintain hydration.  BiPAP per Pulmonary.  Initiate Zyvox and continue

Zosyn.  Await cultures.  Supportive care.



Thank you, Dr. Wellington, for asking us to participate in this patient's care. 

Should you have further questions or concerns, please call.  The patient seen

and examined and plan of care implemented by Dr. Jarvis Rosenbaum.

 



______________________________

JARVIS ROSENBAUM MD



DR:  JULIETA/duane  JOB#:  [id number] / [id number]

DD:  12/10/2017 14:13  DT:  12/11/2017 01:50

## 2017-12-11 NOTE — HP
ADMIT DATE:  12/10/2017



CHIEF COMPLAINT:  Respiratory failure.



HISTORY OF PRESENT ILLNESS:  A 57-year-old white male with end-stage

oxygen-dependent COPD and chronic aggressive tobacco use, who came in with

increasing obtundation and respiratory distress.  Blood gases showed mild

respiratory acidosis with metabolic compensation and he was placed on steroids,

medications and BiPAP.  He is a DNR patient.



PAST MEDICAL HISTORY:  Well documented in old records.



MEDICATIONS:  He takes opioids, per pain clinic, for chronic pain, multiple

medications.



ALLERGIES:  PAXIL.



SOCIAL HISTORY:  Still smokes a pack a day.  , disabled, not physically

active.



FAMILY HISTORY:  Unremarkable.



REVIEW OF SYSTEMS:  No other known problems.



OBJECTIVE:

ENT:  All within normal limits.

NECK:  No masses, nodes or bruits.

LUNGS:  Coarse expiratory rhonchi _____ expiratory wheezes.

CARDIOVASCULAR:  Regular rate.  No irregular beat or murmur.

ABDOMEN:  Soft, benign and nontender.

EXTREMITIES:  2+ clubbing.  Decreased radial pulses and pedal pulses.  No joint

or skin lesions.

NEUROLOGIC:  Physiologic.



ASSESSMENT:  Acute-on-chronic respiratory failure with end-stage chronic

obstructive pulmonary disease, chronic tobacco abuse, and diffuse vasculopathy.



PLAN:  DNR, comfort care and supportive care.

 



______________________________

MONIKA AHUMADA MD



DR:  SANJUANITA/duane  JOB#:  [id number] / [id number]

DD:  12/11/2017 08:54  DT:  12/11/2017 09:05

## 2017-12-11 NOTE — CARD
--------------- APPROVED REPORT --------------





EXAM: Two-dimensional and M-mode echocardiogram with Doppler and color Doppler.



Other Information 

Quality : Average



INDICATION

Non STEMI



2D DIMENSIONS 

RVDd4.2 (2.9-3.5cm)Left Atrium(2D)3.1 (1.6-4.0cm)

IVSd1.1 (0.7-1.1cm)Aortic Root(2D)3.4 (2.0-3.7cm)

LVDd4.1 (3.9-5.9cm)PWd1.0 (0.7-1.1cm)

LVDs3.0 (2.5-4.0cm)FS (%) 27.9 %

SV41.1 mlLVEF(%)54.6 (>50%)



Aortic Valve

AoV Peak Cam.100.8cm/sAoV VTI17.7cm

AO Peak GR.4.1mmHgLVOT  VTI 15.87cm

AO Mean GR.2mmHgAVA   (VTI)3.00cm2



Mitral Valve

MV E Tjmwxdag45.3cm/sMV DECEL [id number]

MV A Esixqbzk13.6cm/sE/A  Ratio0.8



TDI

Lateral E' P. V9.33cm/sMedial E' P. V5.79cm/s

E/Lateral E'7.9E/Medial E'12.7



Tricuspid Valve

TR P. [id number]/sRAP [id number]

TR Peak Gr.37mmHg



 LEFT VENTRICLE 

The left ventricle is normal size. There is normal left ventricular wall thickness. Left ventricle sy
stolic function is normal. The Ejection Fraction is 50-55%. Abnormal septal motion suggestive of cond
uction defect and RV pressure/volume overload. Transmitral Doppler flow pattern is normal for age.



 RIGHT VENTRICLE 

The right ventricle is mildly to moderately dilated. RV Systolic function is mildly reduced.



 ATRIA 

The left atrium size is normal. The right atrium is moderately dilated. The interatrial septum is int
act with no evidence for an atrial septal defect or patent foramen ovale as noted on 2-D or Doppler i
lilo.



 AORTIC VALVE 

Not well visualized. Doppler and Color Flow revealed no significant aortic regurgitation. There is no
 significant aortic valvular stenosis.



 MITRAL VALVE 

Not well visualized. There is no evidence of mitral valve prolapse. There is no mitral valve stenosis
. Doppler and Color Flow revealed no mitral valve regurgitation noted.



 TRICUSPID VALVE 

Grossly normal in structure. Doppler and Color Flow revealed mild tricuspid regurgitation. There is m
ild-moderate pulmonary hypertension. The PA pressure was estimated at 39 mmHg. There is no tricuspid 
valve prolapse or vegetation. There is no tricuspid valve stenosis.



 PULMONIC VALVE 

Doppler and Color Flow revealed no pulmonic valvular regurgitation. There is no pulmonic valvular johnny
nosis.



 GREAT VESSELS 

The aortic root is normal in size. The ascending aorta is normal in size. Unable to clearly visualize
 the IVC.



 PERICARDIAL EFFUSION 

There is no pleural effusion. There is no evidence of significant pericardial effusion.



Critical Notification

Critical Value: No



<Conclusion>

Left ventricle systolic function is normal. The Ejection Fraction is 50-55%.

Abnormal septal motion suggestive of conduction defect and RV pressure/volume overload.

The right ventricle is mildly to moderately dilated.

RV Systolic function is mildly reduced.

## 2017-12-11 NOTE — PDOC
Provider Note


Provider Note


will add solumedrol. ppn, iv pepcid re resp failure- is dnr, labs ok, urine 

cult pending











MONIKA AHUMADA MD Dec 11, 2017 09:04

## 2017-12-12 VITALS — SYSTOLIC BLOOD PRESSURE: 151 MMHG | DIASTOLIC BLOOD PRESSURE: 79 MMHG

## 2017-12-12 VITALS — SYSTOLIC BLOOD PRESSURE: 151 MMHG | DIASTOLIC BLOOD PRESSURE: 84 MMHG

## 2017-12-12 VITALS — DIASTOLIC BLOOD PRESSURE: 88 MMHG | SYSTOLIC BLOOD PRESSURE: 159 MMHG

## 2017-12-12 VITALS — SYSTOLIC BLOOD PRESSURE: 125 MMHG | DIASTOLIC BLOOD PRESSURE: 63 MMHG

## 2017-12-12 VITALS — DIASTOLIC BLOOD PRESSURE: 70 MMHG | SYSTOLIC BLOOD PRESSURE: 124 MMHG

## 2017-12-12 VITALS — SYSTOLIC BLOOD PRESSURE: 150 MMHG | DIASTOLIC BLOOD PRESSURE: 83 MMHG

## 2017-12-12 LAB — T4 FREE SERPL-MCNC: 1.07 NG/DL (ref 0.76–1.46)

## 2017-12-12 PROCEDURE — 5A09357 ASSISTANCE WITH RESPIRATORY VENTILATION, LESS THAN 24 CONSECUTIVE HOURS, CONTINUOUS POSITIVE AIRWAY PRESSURE: ICD-10-PCS | Performed by: FAMILY MEDICINE

## 2017-12-12 RX ADMIN — PIPERACILLIN SODIUM AND TAZOBACTAM SODIUM SCH GM: 3; .375 INJECTION, POWDER, FOR SOLUTION INTRAVENOUS at 13:35

## 2017-12-12 RX ADMIN — PIPERACILLIN SODIUM AND TAZOBACTAM SODIUM SCH GM: 3; .375 INJECTION, POWDER, FOR SOLUTION INTRAVENOUS at 00:13

## 2017-12-12 RX ADMIN — IPRATROPIUM BROMIDE AND ALBUTEROL SULFATE SCH ML: .5; 3 SOLUTION RESPIRATORY (INHALATION) at 07:39

## 2017-12-12 RX ADMIN — TAMSULOSIN HYDROCHLORIDE SCH MG: 0.4 CAPSULE ORAL at 20:49

## 2017-12-12 RX ADMIN — IPRATROPIUM BROMIDE AND ALBUTEROL SULFATE SCH ML: .5; 3 SOLUTION RESPIRATORY (INHALATION) at 19:41

## 2017-12-12 RX ADMIN — PIPERACILLIN SODIUM AND TAZOBACTAM SODIUM SCH GM: 3; .375 INJECTION, POWDER, FOR SOLUTION INTRAVENOUS at 05:15

## 2017-12-12 RX ADMIN — PIPERACILLIN SODIUM AND TAZOBACTAM SODIUM SCH GM: 3; .375 INJECTION, POWDER, FOR SOLUTION INTRAVENOUS at 17:35

## 2017-12-12 RX ADMIN — NICOTINE SCH PATCH: 21 PATCH, EXTENDED RELEASE TOPICAL at 09:18

## 2017-12-12 RX ADMIN — IPRATROPIUM BROMIDE AND ALBUTEROL SULFATE SCH ML: .5; 3 SOLUTION RESPIRATORY (INHALATION) at 11:50

## 2017-12-12 RX ADMIN — ASPIRIN SCH MG: 81 TABLET, COATED ORAL at 09:17

## 2017-12-12 RX ADMIN — PANTOPRAZOLE SODIUM SCH MG: 40 TABLET, DELAYED RELEASE ORAL at 09:18

## 2017-12-12 RX ADMIN — PIPERACILLIN SODIUM AND TAZOBACTAM SODIUM SCH GM: 3; .375 INJECTION, POWDER, FOR SOLUTION INTRAVENOUS at 23:20

## 2017-12-12 RX ADMIN — GLYCERIN, ISOLEUCINE, LEUCINE, LYSINE, METHIONINE, PHENYLALANINE, THREONINE, TRYPTOPHAN, VALINE, ALANINE, GLYCINE, ARGININE, HISTIDINE, PROLINE, SERINE, CYSTEINE, SODIUM ACETATE, MAGNESIUM ACETATE, CALCIUM ACETATE, SODIUM CHLORIDE, POTASSIUM CHLORIDE, PHOSPHORIC ACID, AND POTASSIUM METABISULFITE SCH MLS/HR
3; .21; .27; .22; .16; .17; .12; .046; .2; .21; .42; .29; .085; .34; .18; .014; .2; .054; .026; .12; .15; .041 INJECTION INTRAVENOUS at 10:00

## 2017-12-12 RX ADMIN — METHYLPREDNISOLONE SODIUM SUCCINATE SCH MG: 40 INJECTION, POWDER, FOR SOLUTION INTRAMUSCULAR; INTRAVENOUS at 00:14

## 2017-12-12 RX ADMIN — HALOPERIDOL LACTATE PRN MG: 5 INJECTION, SOLUTION INTRAMUSCULAR at 00:14

## 2017-12-12 RX ADMIN — Medication SCH CAP: at 20:49

## 2017-12-12 RX ADMIN — GLYCERIN, ISOLEUCINE, LEUCINE, LYSINE, METHIONINE, PHENYLALANINE, THREONINE, TRYPTOPHAN, VALINE, ALANINE, GLYCINE, ARGININE, HISTIDINE, PROLINE, SERINE, CYSTEINE, SODIUM ACETATE, MAGNESIUM ACETATE, CALCIUM ACETATE, SODIUM CHLORIDE, POTASSIUM CHLORIDE, PHOSPHORIC ACID, AND POTASSIUM METABISULFITE SCH MLS/HR
3; .21; .27; .22; .16; .17; .12; .046; .2; .21; .42; .29; .085; .34; .18; .014; .2; .054; .026; .12; .15; .041 INJECTION INTRAVENOUS at 01:01

## 2017-12-12 RX ADMIN — IPRATROPIUM BROMIDE AND ALBUTEROL SULFATE SCH ML: .5; 3 SOLUTION RESPIRATORY (INHALATION) at 16:02

## 2017-12-12 NOTE — PDOC
PULMONARY PROGRESS NOTES


Subjective


feels better, fully awake


off BIPAP


Vitals





Vital Signs








  Date Time  Temp Pulse Resp B/P (MAP) Pulse Ox O2 Delivery O2 Flow Rate FiO2


 


12/12/17 07:41     92  3.0 


 


12/12/17 07:00 98.1 94 22 151/84 (106)  Nasal Cannula  





 98.1       








ROS:  No Nausea, No Chest Pain, No Abdominal Pain, No Increase Cough


General:  Alert, No acute distress


HEENT:  Other


Lungs:  Clear


Cardiovascular:  S1, S2


Abdomen:  Soft, Non-tender


Neuro Exam:  Alert


Extremities:  Other (1+edema)


Labs





Laboratory Tests








Test


  12/10/17


11:09 12/10/17


11:35 12/10/17


12:30 12/10/17


14:00


 


Influenza Type A Antigen


  Negative


(NEGATIVE) 


  


  


 


 


Influenza Type B Antigen


  Negative


(NEGATIVE) 


  


  


 


 


Urine Collection Type  U cath   


 


Urine Color  Yellow   


 


Urine Clarity  Cloudy   


 


Urine pH  6.0   


 


Urine Specific Gravity  1.015   


 


Urine Protein


  


  30 mg/dL


(NEG-TRACE) 


  


 


 


Urine Glucose (UA)


  


  Negative mg/dL


(NEG) 


  


 


 


Urine Ketones (Stick)


  


  Negative mg/dL


(NEG) 


  


 


 


Urine Blood  Small (NEG)   


 


Urine Nitrite  Positive (NEG)   


 


Urine Bilirubin  Negative (NEG)   


 


Urine Urobilinogen Dipstick


  


  0.2 mg/dL (0.2


mg/dL) 


  


 


 


Urine Leukocyte Esterase  Moderate (NEG)   


 


Urine RBC


  


  Rare /HPF


(0-2) 


  


 


 


Urine WBC


  


  11-20 /HPF


(0-4) 


  


 


 


Urine Squamous Epithelial


Cells 


  Occ /LPF 


  


  


 


 


Urine Bacteria


  


  Many /HPF


(0-FEW) 


  


 


 


Urine Hyaline Casts  Few /HPF   


 


Urine Opiates Screen  Pos (NEG)   


 


Urine Methadone Screen  Neg (NEG)   


 


Urine Barbiturates  Neg (NEG)   


 


Urine Phencyclidine Screen  Neg (NEG)   


 


Urine


Amphetamine/Methamphetamine 


  Neg (NEG) 


  


  


 


 


Urine Benzodiazepines Screen  Neg (NEG)   


 


Urine Cocaine Screen  Neg (NEG)   


 


Urine Cannabinoids Screen  Neg (NEG)   


 


Urine Ethyl Alcohol  Neg (NEG)   


 


Lactic Acid Level


  


  


  2.0 mmol/L


(0.4-2.0) 2.1 mmol/L


(0.4-2.0)


 


Test


  12/10/17


18:05 12/10/17


20:39 12/10/17


22:00 12/11/17


00:25


 


Troponin I Quantitative


  1.253 ng/mL


(0.000-0.055) 


  


  1.004 ng/mL


(0.000-0.055)


 


O2 Saturation  93 % (92-99)   


 


Arterial Blood pH


  


  7.38


(7.35-7.45) 


  


 


 


Arterial Blood pCO2 at


Patient Temp 


  59 mmHg


(35-46) 


  


 


 


Arterial Blood pO2 at Patient


Temp 


  68 mmHg


() 


  


 


 


Arterial Blood HCO3


  


  33 mmol/L


(21-28) 


  


 


 


Arterial Blood Base Excess


  


  6 mmol/L


(-3-3) 


  


 


 


FiO2  40   


 


Nasal Screen MRSA (PCR)


  


  


  Positive


(Negative) 


 


 


Test


  12/11/17


04:55 12/11/17


07:45 12/11/17


17:24 12/11/17


21:01


 


White Blood Count


  5.9 x10^3/uL


(4.0-11.0) 


  


  


 


 


Red Blood Count


  5.53 x10^6/uL


(4.30-5.70) 


  


  


 


 


Hemoglobin


  15.7 g/dL


(13.0-17.5) 


  


  


 


 


Hematocrit


  48.4 %


(39.0-53.0) 


  


  


 


 


Mean Corpuscular Volume 88 fL ()    


 


Mean Corpuscular Hemoglobin 28 pg (25-35)    


 


Mean Corpuscular Hemoglobin


Concent 32 g/dL


(31-37) 


  


  


 


 


Red Cell Distribution Width


  16.3 %


(11.5-14.5) 


  


  


 


 


Platelet Count


  153 x10^3/uL


(140-400) 


  


  


 


 


Neutrophils (%) (Auto) 70 % (31-73)    


 


Lymphocytes (%) (Auto) 22 % (24-48)    


 


Monocytes (%) (Auto) 8 % (0-9)    


 


Eosinophils (%) (Auto) 0 % (0-3)    


 


Basophils (%) (Auto) 0 % (0-3)    


 


Neutrophils # (Auto)


  4.1 x10^3uL


(1.8-7.7) 


  


  


 


 


Lymphocytes # (Auto)


  1.3 x10^3/uL


(1.0-4.8) 


  


  


 


 


Monocytes # (Auto)


  0.5 x10^3/uL


(0.0-1.1) 


  


  


 


 


Eosinophils # (Auto)


  0.0 x10^3/uL


(0.0-0.7) 


  


  


 


 


Basophils # (Auto)


  0.0 x10^3/uL


(0.0-0.2) 


  


  


 


 


Sodium Level


  137 mmol/L


(136-145) 


  


  


 


 


Potassium Level


  4.1 mmol/L


(3.5-5.1) 


  


  


 


 


Chloride Level


  94 mmol/L


() 


  


  


 


 


Carbon Dioxide Level


  36 mmol/L


(21-32) 


  


  


 


 


Anion Gap 7 (6-14)    


 


Blood Urea Nitrogen


  19 mg/dL


(8-26) 


  


  


 


 


Creatinine


  1.2 mg/dL


(0.7-1.3) 


  


  


 


 


Estimated GFR


(Cockcroft-Gault) 62.0 


  


  


  


 


 


Glucose Level


  169 mg/dL


(70-99) 


  


  


 


 


Calcium Level


  8.6 mg/dL


(8.5-10.1) 


  


  


 


 


Magnesium Level


  2.1 mg/dL


(1.8-2.4) 


  


  


 


 


Triglycerides Level


  93 mg/dL


(0-150) 


  


  


 


 


Cholesterol Level


  144 mg/dL


(0-200) 


  


  


 


 


LDL Cholesterol, Calculated


  79 mg/dL


(0-100) 


  


  


 


 


VLDL Cholesterol, Calculated


  19 mg/dL


(0-40) 


  


  


 


 


Non-HDL Cholesterol Calculated


  98 mg/dL


(0-129) 


  


  


 


 


HDL Cholesterol


  46 mg/dL


(40-60) 


  


  


 


 


Cholesterol/HDL Ratio 3.1    


 


O2 Saturation  91 % (92-99)   


 


Arterial Blood pH


  


  7.37


(7.35-7.45) 


  


 


 


Arterial Blood pCO2 at


Patient Temp 


  71 mmHg


(35-46) 


  


 


 


Arterial Blood pO2 at Patient


Temp 


  66 mmHg


() 


  


 


 


Arterial Blood HCO3


  


  40 mmol/L


(21-28) 


  


 


 


Arterial Blood Base Excess


  


  12 mmol/L


(-3-3) 


  


 


 


FiO2  40   


 


Glucose (Fingerstick)


  


  


  207 mg/dL


(70-99) 177 mg/dL


(70-99)


 


Test


  12/12/17


07:16 12/12/17


08:55 


  


 


 


Glucose (Fingerstick)


  189 mg/dL


(70-99) 


  


  


 


 


Free Thyroxine


  


  1.07 ng/dL


(0.76-1.46) 


  


 


 


Free Triiodothyronine (T3)


pg/mL 


  1.70 pg/mL


(2.18-3.98) 


  


 








Laboratory Tests








Test


  12/11/17


17:24 12/11/17


21:01 12/12/17


07:16 12/12/17


08:55


 


Glucose (Fingerstick)


  207 mg/dL


(70-99) 177 mg/dL


(70-99) 189 mg/dL


(70-99) 


 


 


Free Thyroxine


  


  


  


  1.07 ng/dL


(0.76-1.46)


 


Free Triiodothyronine (T3)


pg/mL 


  


  


  1.70 pg/mL


(2.18-3.98)








Medications





Active Scripts








 Medications  Dose


 Route/Sig


 Max Daily Dose Days Date Category


 


 Lidocaine 1 Each


 Adh..patch  1 Each


 TP DAILY


    8/26/17 Reported


 


 Omeprazole 40 Mg


 Capsule.  1 Cap


 PO DAILY


    8/26/17 Reported


 


 Amitriptyline Hcl


 25 Mg Tablet  1 Tab


 PO QHS


    8/26/17 Reported


 


 Albuterol Sulfate


 Neb Soln


  (Albuterol


 Sulfate) 2.5 Mg/3


 Ml Vial.neb  1 Vial


 NEB PRN Q6HRS PRN


    8/26/17 Reported


 


 Levetiracetam 500


 Mg Tablet  500 Mg


 PO BID


    8/16/16 Reported


 


 Flomax


  (Tamsulosin Hcl)


 0.4 Mg Cap.er.24h  0.4 Mg


 PO DAILY


    1/4/16 Reported


 


 Lasix


  (Furosemide) 20


 Mg Tablet  20 Mg


 PO BID


    9/28/15 Reported


 


 [Polyethylene


 Glycol 3350] 17


 GM Packet  17 Gm


 PO PRN DAILY PRN


    9/18/15 Rx


 


 Advair 250-50


 Diskus


  (Fluticasone/Salmeterol)


 1 Puff Puff  1 Puff


 IH BID


    9/18/15 Rx


 


 Klonopin


  (Clonazepam) 0.5


 Mg Tablet  0.5 Mg


 PO BID


    9/18/15 Rx


 


 [Baclofen] 10 MG


 Tablet  20 Mg


 PO BID


    9/18/15 Rx


 


 Children's


 Aspirin (Aspirin)


 81 Mg Tab.chew  81 Mg


 PO DAILY


    9/18/15 Rx


 


 Ventolin Hfa


 Inhaler


  (Albuterol


 Sulfate) 1 Puff


 Puff  2 Puff


 INH PRN Q4HRS PRN


    9/18/15 Rx











Impression


.


1.  Acute on chronic hypercapnic respiratory failure secondary to acute toxic


encephalopathy and chronic obstructive pulmonary disease exacerbation.


2.  Possible left lower lobe infiltrate versus atelectasis.


3.  History of stable basal lung nodules, to be followed up at the office with


Dr. Voss.


4.  Urinary tract infection, present on admission.


5.  Increased troponin, present on admission.





Plan


.





1.  Continue with present nasal canula and qhs BiPAP,


2.  Continue present bronchodilators.


3.  Steroids.


4.  Follow urine cultures.


5.  Follow chest x-ray as needed.


6.  Follow outpatient CT chest regarding lung nodule.


7.  Continue bronchodilators.


8.  Continue antibiotic per Infectious Disease.


     d/w RN, Start PT











TESSA ACEVEDO MD Dec 12, 2017 11:04

## 2017-12-12 NOTE — PDOC
Infectious Disease Note


Subjective


Subjective


awake, feeling good says, no complaints





ROS


ROS


GEN: Denies fevers, chills, sweats


HEENT: Denies blurred vision, sore throat


CV: Denies chest pain


RESP: Denies shortness of air, cough


GI: Denies n/v/d


NEURO: Denies confusion, dizziness


MSK: Denies weakness, joint pain/swelling





Vital Sign


Vital Signs





Vital Signs








  Date Time  Temp Pulse Resp B/P (MAP) Pulse Ox O2 Delivery O2 Flow Rate FiO2


 


12/12/17 07:41     92  3.0 


 


12/12/17 07:00 98.1 94 22 151/84 (106)  Nasal Cannula  





 98.1       











Physical Exam


PHYSICAL EXAM


GENERAL:  NAD, Alert


HEENT:  PERRL, OC/OP


NECK:  Supple, no JVD, no LN


LUNGS:  Clear


HEART:  S1S2, no gallop, no murmur


ABD:  Soft, NT, no organomegaly, no rebound


EXT:  No edema, no cyanosis


CNS:  Alert, oriented x 3, no focal neurologic deficit


SKIN:  No rash


IV: ok





Labs


Lab





Laboratory Tests








Test


  12/11/17


17:24 12/11/17


21:01 12/12/17


07:16


 


Glucose (Fingerstick)


  207 mg/dL


(70-99) 177 mg/dL


(70-99) 189 mg/dL


(70-99)








Micro


BC neg


urine g neg gokul





Objective


Assessment


Encephalopathy


CO2 retention


COPD exacerbation


UTI


Lactic acidosis


ARF





Plan


Plan of Care


pt/ot


d/c zyvox and cont zosyn


check cultures


supportive care











RENITA LOZA MD Dec 12, 2017 09:45

## 2017-12-12 NOTE — PDOC
Provider Note


Provider Note


more awake, back to baseline mental status- exam ok, afeb, vss- labs ok, has 

gram neg rods in urine - cont zosyn, more po meds , reduce steroids, adv diet











MONIKA AHUMADA MD Dec 12, 2017 08:14

## 2017-12-13 VITALS — SYSTOLIC BLOOD PRESSURE: 113 MMHG | DIASTOLIC BLOOD PRESSURE: 76 MMHG

## 2017-12-13 VITALS — SYSTOLIC BLOOD PRESSURE: 136 MMHG | DIASTOLIC BLOOD PRESSURE: 83 MMHG

## 2017-12-13 VITALS — SYSTOLIC BLOOD PRESSURE: 131 MMHG | DIASTOLIC BLOOD PRESSURE: 80 MMHG

## 2017-12-13 VITALS — SYSTOLIC BLOOD PRESSURE: 138 MMHG | DIASTOLIC BLOOD PRESSURE: 79 MMHG

## 2017-12-13 VITALS — SYSTOLIC BLOOD PRESSURE: 133 MMHG | DIASTOLIC BLOOD PRESSURE: 79 MMHG

## 2017-12-13 VITALS — SYSTOLIC BLOOD PRESSURE: 162 MMHG | DIASTOLIC BLOOD PRESSURE: 94 MMHG

## 2017-12-13 PROCEDURE — 5A09357 ASSISTANCE WITH RESPIRATORY VENTILATION, LESS THAN 24 CONSECUTIVE HOURS, CONTINUOUS POSITIVE AIRWAY PRESSURE: ICD-10-PCS | Performed by: FAMILY MEDICINE

## 2017-12-13 RX ADMIN — ASPIRIN SCH MG: 81 TABLET, COATED ORAL at 08:47

## 2017-12-13 RX ADMIN — Medication SCH CAP: at 20:55

## 2017-12-13 RX ADMIN — IPRATROPIUM BROMIDE AND ALBUTEROL SULFATE SCH ML: .5; 3 SOLUTION RESPIRATORY (INHALATION) at 07:42

## 2017-12-13 RX ADMIN — IPRATROPIUM BROMIDE AND ALBUTEROL SULFATE SCH ML: .5; 3 SOLUTION RESPIRATORY (INHALATION) at 10:58

## 2017-12-13 RX ADMIN — PIPERACILLIN SODIUM AND TAZOBACTAM SODIUM SCH GM: 3; .375 INJECTION, POWDER, FOR SOLUTION INTRAVENOUS at 05:04

## 2017-12-13 RX ADMIN — GLIMEPIRIDE SCH MG: 2 TABLET ORAL at 15:00

## 2017-12-13 RX ADMIN — PANTOPRAZOLE SODIUM SCH MG: 40 TABLET, DELAYED RELEASE ORAL at 08:46

## 2017-12-13 RX ADMIN — NICOTINE SCH PATCH: 21 PATCH, EXTENDED RELEASE TOPICAL at 08:49

## 2017-12-13 RX ADMIN — Medication SCH CAP: at 08:48

## 2017-12-13 RX ADMIN — IPRATROPIUM BROMIDE AND ALBUTEROL SULFATE SCH ML: .5; 3 SOLUTION RESPIRATORY (INHALATION) at 20:11

## 2017-12-13 RX ADMIN — TAMSULOSIN HYDROCHLORIDE SCH MG: 0.4 CAPSULE ORAL at 20:55

## 2017-12-13 RX ADMIN — IPRATROPIUM BROMIDE AND ALBUTEROL SULFATE SCH ML: .5; 3 SOLUTION RESPIRATORY (INHALATION) at 15:12

## 2017-12-13 RX ADMIN — POLYETHYLENE GLYCOL 3350 SCH GM: 17 POWDER, FOR SOLUTION ORAL at 20:55

## 2017-12-13 RX ADMIN — POLYETHYLENE GLYCOL 3350 SCH GM: 17 POWDER, FOR SOLUTION ORAL at 14:33

## 2017-12-13 NOTE — PDOC
PULMONARY PROGRESS NOTES


Subjective


feels better, fully awake


off BIPAP


Vitals





Vital Signs








  Date Time  Temp Pulse Resp B/P (MAP) Pulse Ox O2 Delivery O2 Flow Rate FiO2


 


12/13/17 07:43     91  2.0 


 


12/13/17 07:00 98.1 110 24 138/79 (98)  Nasal Cannula  





 98.1       








ROS:  No Nausea, No Chest Pain, No Abdominal Pain, No Increase Cough


General:  Alert, No acute distress


HEENT:  Other


Lungs:  Clear


Cardiovascular:  S1, S2


Abdomen:  Soft, Non-tender


Neuro Exam:  Alert


Extremities:  Other (1+edema)


Labs





Laboratory Tests








Test


  12/11/17


17:24 12/11/17


21:01 12/12/17


07:16 12/12/17


08:55


 


Glucose (Fingerstick)


  207 mg/dL


(70-99) 177 mg/dL


(70-99) 189 mg/dL


(70-99) 


 


 


Free Thyroxine


  


  


  


  1.07 ng/dL


(0.76-1.46)


 


Free Triiodothyronine (T3)


pg/mL 


  


  


  1.70 pg/mL


(2.18-3.98)


 


Test


  12/12/17


10:59 12/12/17


16:46 12/12/17


20:45 12/13/17


07:59


 


Glucose (Fingerstick)


  226 mg/dL


(70-99) 158 mg/dL


(70-99) 161 mg/dL


(70-99) 139 mg/dL


(70-99)








Laboratory Tests








Test


  12/12/17


10:59 12/12/17


16:46 12/12/17


20:45 12/13/17


07:59


 


Glucose (Fingerstick)


  226 mg/dL


(70-99) 158 mg/dL


(70-99) 161 mg/dL


(70-99) 139 mg/dL


(70-99)








Medications





Active Scripts








 Medications  Dose


 Route/Sig


 Max Daily Dose Days Date Category


 


 Lidocaine 1 Each


 Adh..patch  1 Each


 TP DAILY


    8/26/17 Reported


 


 Omeprazole 40 Mg


 Capsule.  1 Cap


 PO DAILY


    8/26/17 Reported


 


 Amitriptyline Hcl


 25 Mg Tablet  1 Tab


 PO QHS


    8/26/17 Reported


 


 Albuterol Sulfate


 Neb Soln


  (Albuterol


 Sulfate) 2.5 Mg/3


 Ml Vial.neb  1 Vial


 NEB PRN Q6HRS PRN


    8/26/17 Reported


 


 Levetiracetam 500


 Mg Tablet  500 Mg


 PO BID


    8/16/16 Reported


 


 Flomax


  (Tamsulosin Hcl)


 0.4 Mg Cap.er.24h  0.4 Mg


 PO DAILY


    1/4/16 Reported


 


 Lasix


  (Furosemide) 20


 Mg Tablet  20 Mg


 PO BID


    9/28/15 Reported


 


 [Polyethylene


 Glycol 3350] 17


 GM Packet  17 Gm


 PO PRN DAILY PRN


    9/18/15 Rx


 


 Advair 250-50


 Diskus


  (Fluticasone/Salmeterol)


 1 Puff Puff  1 Puff


 IH BID


    9/18/15 Rx


 


 Klonopin


  (Clonazepam) 0.5


 Mg Tablet  0.5 Mg


 PO BID


    9/18/15 Rx


 


 [Baclofen] 10 MG


 Tablet  20 Mg


 PO BID


    9/18/15 Rx


 


 [facility]


 Aspirin (Aspirin)


 81 Mg Tab.chew  81 Mg


 PO DAILY


    9/18/15 Rx


 


 Ventolin Hfa


 Inhaler


  (Albuterol


 Sulfate) 1 Puff


 Puff  2 Puff


 INH PRN Q4HRS PRN


    9/18/15 Rx











Impression


.


1.  Acute on chronic hypercapnic respiratory failure secondary to acute toxic


encephalopathy and chronic obstructive pulmonary disease exacerbation.


2.  Possible left lower lobe infiltrate versus atelectasis.


3.  History of stable basal lung nodules, to be followed up at the office with


Dr. Voss.


4.  Urinary tract infection, present on admission.


5.  Increased troponin, present on admission.





Plan


.





1.  Continue with present nasal canula and qhs BiPAP,


2.  Continue present bronchodilators.


3.  Steroids.


4.  Follow urine cultures.


5.  Follow chest x-ray as needed.


6.  Follow outpatient CT chest regarding lung nodule.


7.  Continue bronchodilators.


8.  Continue antibiotic per Infectious Disease.


     d/w RN, Start PT











TESSA ACEVEDO MD Dec 13, 2017 09:38

## 2017-12-13 NOTE — PDOC
Provider Note


Provider Note


vss, no tyemp, remains at baseline loc- urins shows kleb. sens to most po , 

will stop zosyn, add cipro-  will need rehab re cachexia/weakness











MONIKA AHUMADA MD Dec 13, 2017 07:50

## 2017-12-13 NOTE — PDOC
Infectious Disease Note


Subjective


Subjective


awake, feeling good says, no complaints





ROS


ROS


GEN: Denies fevers, chills, sweats


HEENT: Denies blurred vision, sore throat


CV: Denies chest pain


RESP: Denies shortness of air, cough


GI: Denies n/v/d


NEURO: Denies confusion, dizziness


MSK: Denies weakness, joint pain/swelling





Vital Sign


Vital Signs





Vital Signs








  Date Time  Temp Pulse Resp B/P (MAP) Pulse Ox O2 Delivery O2 Flow Rate FiO2


 


12/13/17 10:36 98.4 98 24 136/83 (100) 90 Nasal Cannula 2.0 





 98.4       











Physical Exam


PHYSICAL EXAM


GENERAL:  NAD, Alert


HEENT:  PERRL, OC/OP


NECK:  Supple, no JVD, no LN


LUNGS:  Clear


HEART:  S1S2, no gallop, no murmur


ABD:  Soft, NT, no organomegaly, no rebound


EXT:  No edema, no cyanosis


CNS:  Alert, oriented x 3, no focal neurologic deficit


SKIN:  No rash


IV: ok





Labs


Lab





Laboratory Tests








Test


  12/12/17


10:59 12/12/17


16:46 12/12/17


20:45 12/13/17


07:59


 


Glucose (Fingerstick)


  226 mg/dL


(70-99) 158 mg/dL


(70-99) 161 mg/dL


(70-99) 139 mg/dL


(70-99)








Micro


BC neg


urine g neg gokul





Objective


Assessment


Encephalopathy


CO2 retention


COPD exacerbation


UTI


Lactic acidosis


ARF





Plan


Plan of Care


pt/ot


change zosyn to po levaquin


d/c to rehab soon


supportive care











RENITA LOZA MD Dec 13, 2017 10:43

## 2017-12-14 VITALS — DIASTOLIC BLOOD PRESSURE: 77 MMHG | SYSTOLIC BLOOD PRESSURE: 136 MMHG

## 2017-12-14 VITALS — SYSTOLIC BLOOD PRESSURE: 140 MMHG | DIASTOLIC BLOOD PRESSURE: 81 MMHG

## 2017-12-14 VITALS — DIASTOLIC BLOOD PRESSURE: 84 MMHG | SYSTOLIC BLOOD PRESSURE: 148 MMHG

## 2017-12-14 RX ADMIN — Medication SCH CAP: at 08:57

## 2017-12-14 RX ADMIN — IPRATROPIUM BROMIDE AND ALBUTEROL SULFATE SCH ML: .5; 3 SOLUTION RESPIRATORY (INHALATION) at 07:58

## 2017-12-14 RX ADMIN — IPRATROPIUM BROMIDE AND ALBUTEROL SULFATE SCH ML: .5; 3 SOLUTION RESPIRATORY (INHALATION) at 11:37

## 2017-12-14 RX ADMIN — NICOTINE SCH PATCH: 21 PATCH, EXTENDED RELEASE TOPICAL at 08:58

## 2017-12-14 RX ADMIN — POLYETHYLENE GLYCOL 3350 SCH GM: 17 POWDER, FOR SOLUTION ORAL at 08:57

## 2017-12-14 RX ADMIN — PANTOPRAZOLE SODIUM SCH MG: 40 TABLET, DELAYED RELEASE ORAL at 05:49

## 2017-12-14 RX ADMIN — ASPIRIN SCH MG: 81 TABLET, COATED ORAL at 08:57

## 2017-12-14 RX ADMIN — GLIMEPIRIDE SCH MG: 2 TABLET ORAL at 08:57

## 2017-12-14 NOTE — PDOC
Infectious Disease Note


Subjective


Subjective


awake, feeling good says, no complaints





ROS


ROS


GEN: Denies fevers, chills, sweats


HEENT: Denies blurred vision, sore throat


CV: Denies chest pain


RESP: Denies shortness of air, cough


GI: Denies n/v/d


NEURO: Denies confusion, dizziness


MSK: Denies weakness, joint pain/swelling





Vital Sign


Vital Signs





Vital Signs








  Date Time  Temp Pulse Resp B/P (MAP) Pulse Ox O2 Delivery O2 Flow Rate FiO2


 


12/14/17 08:00      Nasal Cannula 293.0 


 


12/14/17 07:59     93   


 


12/14/17 07:30 97.9 109 20 148/84 (105)    





 97.9       











Physical Exam


PHYSICAL EXAM


GENERAL:  NAD, Alert


HEENT:  PERRL, OC/OP


NECK:  Supple, no JVD, no LN


LUNGS:  Clear


HEART:  S1S2, no gallop, no murmur


ABD:  Soft, NT, no organomegaly, no rebound


EXT:  No edema, no cyanosis


CNS:  Alert, oriented x 3, no focal neurologic deficit


SKIN:  No rash


IV: ok





Labs


Lab





Laboratory Tests








Test


  12/13/17


17:24 12/13/17


20:58 12/14/17


08:13 12/14/17


11:04


 


Glucose (Fingerstick)


  163 mg/dL


(70-99) 122 mg/dL


(70-99) 107 mg/dL


(70-99) 176 mg/dL


(70-99)








Micro


BC neg


urine g neg gokul





Objective


Assessment


Encephalopathy


CO2 retention


COPD exacerbation


UTI


Lactic acidosis


ARF





Plan


Plan of Care


pt/ot


 po levaquin


d/c to rehab soon


supportive care











RENITA LOZA MD Dec 14, 2017 11:28

## 2017-12-14 NOTE — DS
DATE OF DISCHARGE:  12/14/2017



HOSPITAL SUMMARY:  A 57-year-old white male with end-stage COPD, on oxygen and

has diabetes, came in with obtundation that was felt to be respiratory acidosis

in nature.  His pH was 7.34 with a pCO2 of 78, which is above his normal

baseline of about 60.  Chemistry profile was unremarkable.  Troponin mildly

elevated at 1.25.  TSH was low at 0.15, but free T4 and free T3 were normal. 

Creatinine was 1.6 on admission and came down to 1.2, normal baseline for him. 

CBC unremarkable.  Drug screen was positive for opiates only, which he takes

_____ and his flu test was negative.  Urine showed evidence of infection with

only Klebsiella found, which was sensitive to Zosyn and quinolones.  Blood

cultures were negative.  He was treated with IV Zyvox and Zosyn and then these

were changed to oral Levaquin because of the UTI.  He has been afebrile since he

awakened and is feeling much better, but because of generalized weakness,

transferred to [place] for rehabilitation therapy.



FINAL DIAGNOSES:

1.  Sepsis secondary to urinary tract infection.

2.  Acute-on-chronic respiratory failure.

3.  Chronic obstructive pulmonary disease.

4.  Chronic nonmalignant pain disorder.

5.  Acute renal failure, resolved.

6.  Protein-calorie malnutrition.



OPERATIONS, PROCEDURES AND COMPLICATIONS:  None.



CONSULTATIONS:  Dr. Thorpe and Dr. Jarvis Rosenbaum.



DISPOSITION:  He will take five more days of Levaquin 500 mg daily.  Home meds

remain the same.  He will not take anymore steroids because of his diabetes and

office followup is scheduled.  His prognosis is poor because of continued

tobacco use and very severe COPD on oxygen.

 



______________________________

MONIKA AHUMADA MD



DR:  SANJUANITA/duane  JOB#:  [id number] / [id number]

DD:  12/14/2017 08:10  DT:  12/14/2017 10:07

## 2017-12-14 NOTE — DISCH
DISCHARGE


FINAL DIAGNOSIS


Problems


Medical Problems:


(1) Acute on chronic respiratory failure


Status: Acute  





(2) CO2 narcosis


Status: Acute  





(3) Elevated brain natriuretic peptide (BNP) level


Status: Acute  





(4) Elevated troponin


Status: Acute  





(5) Hypoxia


Status: Acute  





(6) UTI (urinary tract infection)


Status: Acute  








CONDITION ON DISCHARGE:  Stable


SNF STAY <30 DAYS:  Yes


POST DISCHARGE ORDERS


ACTIVITY ORDERS:  Activity as tolerated


WEIGHT BEARING STATUS:  No restrictions


BATHING ORDERS:  Shower-keep dressing dry


DIET AFTER DISCHARGE:  ADA


WOUND/INCISION CARE:  No wound care needed





FOLLOW-UP


PHYSICIAN FOLLOW-UP:  as scheduled





TREATMENT/EQUIPMENT ORDERS


ADAPTIVE EQUIPMENT NEEDED:  Cane, Front wheeled walker


RESPIRATORY EQUIPMENT NEEDED:  Oxygen











MONIKA AHUMADA MD Dec 14, 2017 08:07

## 2018-01-03 ENCOUNTER — HOSPITAL ENCOUNTER (OUTPATIENT)
Dept: HOSPITAL 61 - PNCL | Age: 58
Discharge: HOME | End: 2018-01-03
Attending: ANESTHESIOLOGY
Payer: COMMERCIAL

## 2018-01-03 DIAGNOSIS — Z86.39: ICD-10-CM

## 2018-01-03 DIAGNOSIS — Z88.8: ICD-10-CM

## 2018-01-03 DIAGNOSIS — F41.9: ICD-10-CM

## 2018-01-03 DIAGNOSIS — Z86.718: ICD-10-CM

## 2018-01-03 DIAGNOSIS — F32.9: ICD-10-CM

## 2018-01-03 DIAGNOSIS — K21.9: ICD-10-CM

## 2018-01-03 DIAGNOSIS — Z87.01: ICD-10-CM

## 2018-01-03 DIAGNOSIS — I10: ICD-10-CM

## 2018-01-03 DIAGNOSIS — G89.4: Primary | ICD-10-CM

## 2018-01-03 DIAGNOSIS — Z87.39: ICD-10-CM

## 2018-01-03 DIAGNOSIS — Z86.14: ICD-10-CM

## 2018-01-03 DIAGNOSIS — Z86.69: ICD-10-CM

## 2018-01-03 DIAGNOSIS — J43.9: ICD-10-CM

## 2018-01-03 DIAGNOSIS — M19.90: ICD-10-CM

## 2018-01-03 PROCEDURE — 62370 ANL SP INF PMP W/MDREPRG&FIL: CPT

## 2018-01-03 PROCEDURE — 95991 SPIN/BRAIN PUMP REFIL & MAIN: CPT

## 2018-01-29 VITALS — DIASTOLIC BLOOD PRESSURE: 76 MMHG

## 2018-05-17 ENCOUNTER — HOSPITAL ENCOUNTER (OUTPATIENT)
Dept: HOSPITAL 61 - PNCL | Age: 58
End: 2018-05-17
Attending: ANESTHESIOLOGY
Payer: COMMERCIAL

## 2018-05-17 DIAGNOSIS — G89.4: Primary | ICD-10-CM

## 2018-05-17 PROCEDURE — 95991 SPIN/BRAIN PUMP REFIL & MAIN: CPT

## 2018-06-09 ENCOUNTER — HOSPITAL ENCOUNTER (INPATIENT)
Dept: HOSPITAL 61 - 6 SOUTH | Age: 58
LOS: 3 days | Discharge: HOME | DRG: 917 | End: 2018-06-12
Attending: FAMILY MEDICINE | Admitting: FAMILY MEDICINE
Payer: COMMERCIAL

## 2018-06-09 DIAGNOSIS — G89.4: ICD-10-CM

## 2018-06-09 DIAGNOSIS — Z66: ICD-10-CM

## 2018-06-09 DIAGNOSIS — Z82.3: ICD-10-CM

## 2018-06-09 DIAGNOSIS — E11.9: ICD-10-CM

## 2018-06-09 DIAGNOSIS — J96.21: ICD-10-CM

## 2018-06-09 DIAGNOSIS — Z83.3: ICD-10-CM

## 2018-06-09 DIAGNOSIS — I50.9: ICD-10-CM

## 2018-06-09 DIAGNOSIS — J44.1: ICD-10-CM

## 2018-06-09 DIAGNOSIS — Z88.8: ICD-10-CM

## 2018-06-09 DIAGNOSIS — T40.2X1A: Primary | ICD-10-CM

## 2018-06-09 DIAGNOSIS — G47.33: ICD-10-CM

## 2018-06-09 DIAGNOSIS — Z82.49: ICD-10-CM

## 2018-06-09 DIAGNOSIS — G90.50: ICD-10-CM
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
## 2018-06-09 DIAGNOSIS — Z87.01: ICD-10-CM

## 2018-06-09 DIAGNOSIS — Z79.899: ICD-10-CM

## 2018-06-09 DIAGNOSIS — J98.4: ICD-10-CM

## 2018-06-09 DIAGNOSIS — F41.9: ICD-10-CM

## 2018-06-09 DIAGNOSIS — Z82.5: ICD-10-CM

## 2018-06-09 DIAGNOSIS — Z86.14: ICD-10-CM

## 2018-06-09 DIAGNOSIS — Z99.81: ICD-10-CM

## 2018-06-09 DIAGNOSIS — F17.200: ICD-10-CM

## 2018-06-09 DIAGNOSIS — R06.89: ICD-10-CM

## 2018-06-09 DIAGNOSIS — Z91.19: ICD-10-CM

## 2018-06-09 DIAGNOSIS — J96.22: ICD-10-CM

## 2018-06-09 DIAGNOSIS — Z87.440: ICD-10-CM

## 2018-06-09 DIAGNOSIS — K21.9: ICD-10-CM

## 2018-06-09 LAB
ADD MAN DIFF?: NO
ALBUMIN SERPL-MCNC: 3.2 G/DL (ref 3.4–5)
ALBUMIN/GLOB SERPL: 0.8 {RATIO} (ref 1–1.7)
ALLEN TEST: (no result)
ALP SERPL-CCNC: 75 U/L (ref 46–116)
ALT (SGPT): 23 U/L (ref 16–63)
ANION GAP SERPL CALC-SCNC: -2 MMOL/L (ref 6–14)
AST SERPL-CCNC: 27 U/L (ref 15–37)
BASE EXCESS ABG: 15 MMOL/L (ref -3–3)
BASE EXCESS ABG: 9 MMOL/L (ref -3–3)
BASO #: 0.1 X10^3/UL (ref 0–0.2)
BASO %: 1 % (ref 0–3)
BLOOD UREA NITROGEN: 20 MG/DL (ref 8–26)
BUN/CREAT SERPL: 18 (ref 6–20)
CALCIUM: 9 MG/DL (ref 8.5–10.1)
CHLORIDE: 98 MMOL/L (ref 98–107)
CK SERPL-CCNC: 67 U/L (ref 39–308)
CO2 SERPL-SCNC: 43 MMOL/L (ref 21–32)
CREAT SERPL-MCNC: 1.1 MG/DL (ref 0.7–1.3)
EOS #: 0.1 X10^3/UL (ref 0–0.7)
EOS %: 1 % (ref 0–3)
FIO2 ABG: 36
FIO2 ABG: 50
GFR SERPLBLD BASED ON 1.73 SQ M-ARVRAT: 68.3 ML/MIN
GLOBULIN SER-MCNC: 3.8 G/DL (ref 2.2–3.8)
GLUCOSE SERPL-MCNC: 225 MG/DL (ref 70–99)
HCG SERPL-ACNC: 9.4 X10^3/UL (ref 4–11)
HCO3 ABG: 39 MMOL/L (ref 21–28)
HCO3 ABG: 45 MMOL/L (ref 21–28)
HEMATOCRIT: 46.1 % (ref 39–53)
HEMOGLOBIN: 15 G/DL (ref 13–17.5)
INR: 0.9 (ref 0.8–1.1)
LACTIC ACID: 2.3 MMOL/L (ref 0.4–2)
LYMPH #: 1.7 X10^3/UL (ref 1–4.8)
LYMPH %: 18 % (ref 24–48)
MEAN CORPUSCULAR HEMOGLOBIN: 29 PG (ref 25–35)
MEAN CORPUSCULAR HGB CONC: 33 G/DL (ref 31–37)
MEAN CORPUSCULAR VOLUME: 91 FL (ref 79–100)
MONO #: 0.6 X10^3/UL (ref 0–1.1)
MONO %: 7 % (ref 0–9)
NEUT #: 6.9 X10^3UL (ref 1.8–7.7)
NEUT %: 74 % (ref 31–73)
NT-PRO BNP: 2358 PG/ML (ref 0–124)
O2 DELIVERY DEVICE: (no result)
PCO2 ABG: 75 MMHG (ref 35–46)
PCO2 ABG: 81 MMHG (ref 35–46)
PH ABG: 7.33 (ref 7.35–7.45)
PH ABG: 7.36 (ref 7.35–7.45)
PLATELET COUNT: 204 X10^3/UL (ref 140–400)
PO2 ABG: 45 MMHG (ref 65–108)
PO2 ABG: 72 MMHG (ref 65–108)
POC GLUCOSE: 214 MG/DL (ref 70–99)
POTASSIUM SERPL-SCNC: 5.4 MMOL/L (ref 3.5–5.1)
PROTHROMBIN TIME PATIENT: 11.9 SEC (ref 11.7–14)
RED BLOOD COUNT: 5.09 X10^6/UL (ref 4.3–5.7)
RED CELL DISTRIBUTION WIDTH: 15.4 % (ref 11.5–14.5)
SAT O2 ABG: 83 % (ref 92–99)
SAT O2 ABG: 94 % (ref 92–99)
SODIUM: 139 MMOL/L (ref 136–145)
TOTAL BILIRUBIN: 0.2 MG/DL (ref 0.2–1)
TOTAL PROTEIN: 7 G/DL (ref 6.4–8.2)
TROPONINI: 0.03 NG/ML (ref 0–0.06)
TROPONINI: < 0.017 NG/ML (ref 0–0.06)

## 2018-06-09 PROCEDURE — 83880 ASSAY OF NATRIURETIC PEPTIDE: CPT

## 2018-06-09 PROCEDURE — 82550 ASSAY OF CK (CPK): CPT

## 2018-06-09 PROCEDURE — 71045 X-RAY EXAM CHEST 1 VIEW: CPT

## 2018-06-09 PROCEDURE — 96375 TX/PRO/DX INJ NEW DRUG ADDON: CPT

## 2018-06-09 PROCEDURE — 5A09457 ASSISTANCE WITH RESPIRATORY VENTILATION, 24-96 CONSECUTIVE HOURS, CONTINUOUS POSITIVE AIRWAY PRESSURE: ICD-10-PCS

## 2018-06-09 PROCEDURE — 93005 ELECTROCARDIOGRAM TRACING: CPT

## 2018-06-09 PROCEDURE — 96361 HYDRATE IV INFUSION ADD-ON: CPT

## 2018-06-09 PROCEDURE — 85610 PROTHROMBIN TIME: CPT

## 2018-06-09 PROCEDURE — 99291 CRITICAL CARE FIRST HOUR: CPT

## 2018-06-09 PROCEDURE — 92610 EVALUATE SWALLOWING FUNCTION: CPT

## 2018-06-09 PROCEDURE — 87641 MR-STAPH DNA AMP PROBE: CPT

## 2018-06-09 PROCEDURE — 82805 BLOOD GASES W/O2 SATURATION: CPT

## 2018-06-09 PROCEDURE — 80053 COMPREHEN METABOLIC PANEL: CPT

## 2018-06-09 PROCEDURE — 36415 COLL VENOUS BLD VENIPUNCTURE: CPT

## 2018-06-09 PROCEDURE — 85025 COMPLETE CBC W/AUTO DIFF WBC: CPT

## 2018-06-09 PROCEDURE — 94660 CPAP INITIATION&MGMT: CPT

## 2018-06-09 PROCEDURE — 94640 AIRWAY INHALATION TREATMENT: CPT

## 2018-06-09 PROCEDURE — 84484 ASSAY OF TROPONIN QUANT: CPT

## 2018-06-09 PROCEDURE — 36600 WITHDRAWAL OF ARTERIAL BLOOD: CPT

## 2018-06-09 PROCEDURE — 83605 ASSAY OF LACTIC ACID: CPT

## 2018-06-09 PROCEDURE — 82962 GLUCOSE BLOOD TEST: CPT

## 2018-06-09 PROCEDURE — 80048 BASIC METABOLIC PNL TOTAL CA: CPT

## 2018-06-09 RX ADMIN — IPRATROPIUM BROMIDE AND ALBUTEROL SULFATE 1 ML: .5; 3 SOLUTION RESPIRATORY (INHALATION) at 17:15

## 2018-06-09 RX ADMIN — IPRATROPIUM BROMIDE AND ALBUTEROL SULFATE 1 ML: .5; 3 SOLUTION RESPIRATORY (INHALATION) at 20:03

## 2018-06-09 RX ADMIN — BACITRACIN 1 MLS/HR: 5000 INJECTION, POWDER, FOR SOLUTION INTRAMUSCULAR at 17:15

## 2018-06-09 RX ADMIN — NALOXONE HYDROCHLORIDE 1 MG: 1 INJECTION PARENTERAL at 17:29

## 2018-06-09 RX ADMIN — AMIODARONE HYDROCHLORIDE 1 MLS/HR: 50 INJECTION, SOLUTION INTRAVENOUS at 19:00

## 2018-06-09 RX ADMIN — METHYLPREDNISOLONE SODIUM SUCCINATE 1 MG: 125 INJECTION, POWDER, FOR SOLUTION INTRAMUSCULAR; INTRAVENOUS at 17:29

## 2018-06-09 RX ADMIN — ONDANSETRON 1 MG: 2 INJECTION INTRAMUSCULAR; INTRAVENOUS at 17:30

## 2018-06-09 RX ADMIN — AMIODARONE HYDROCHLORIDE 1 MLS/HR: 50 INJECTION, SOLUTION INTRAVENOUS at 17:49

## 2018-06-10 LAB
BASE EXCESS ABG: 11 MMOL/L (ref -3–3)
FIO2 ABG: (no result)
HCO3 ABG: 38 MMOL/L (ref 21–28)
PCO2 ABG: 60 MMHG (ref 35–46)
PH ABG: 7.41 (ref 7.35–7.45)
PO2 ABG: 64 MMHG (ref 65–108)
SAT O2 ABG: 93 % (ref 92–99)
TROPONINI: 0.04 NG/ML (ref 0–0.06)

## 2018-06-10 RX ADMIN — AMIODARONE HYDROCHLORIDE 1 MLS/HR: 50 INJECTION, SOLUTION INTRAVENOUS at 01:15

## 2018-06-10 RX ADMIN — TAMSULOSIN HYDROCHLORIDE 1 MG: 0.4 CAPSULE ORAL at 12:30

## 2018-06-10 RX ADMIN — LIDOCAINE 1 PATCH: 50 PATCH CUTANEOUS at 13:05

## 2018-06-10 RX ADMIN — IPRATROPIUM BROMIDE AND ALBUTEROL SULFATE 1 ML: .5; 3 SOLUTION RESPIRATORY (INHALATION) at 15:22

## 2018-06-10 RX ADMIN — FUROSEMIDE 1 MG: 20 TABLET ORAL at 12:30

## 2018-06-10 RX ADMIN — PANTOPRAZOLE SODIUM 1 MG: 40 TABLET, DELAYED RELEASE ORAL at 16:39

## 2018-06-10 RX ADMIN — IPRATROPIUM BROMIDE AND ALBUTEROL SULFATE 1 ML: .5; 3 SOLUTION RESPIRATORY (INHALATION) at 07:48

## 2018-06-10 RX ADMIN — GLIMEPIRIDE 1 MG: 2 TABLET ORAL at 12:30

## 2018-06-10 RX ADMIN — TAMSULOSIN HYDROCHLORIDE 1 MG: 0.4 CAPSULE ORAL at 13:05

## 2018-06-10 RX ADMIN — NORTRIPTYLINE HYDROCHLORIDE 1 MG: 25 CAPSULE ORAL at 21:34

## 2018-06-10 RX ADMIN — ASPIRIN 81 MG 1 MG: 81 TABLET ORAL at 13:05

## 2018-06-10 RX ADMIN — IPRATROPIUM BROMIDE AND ALBUTEROL SULFATE 1 ML: .5; 3 SOLUTION RESPIRATORY (INHALATION) at 11:14

## 2018-06-10 RX ADMIN — ASPIRIN 81 MG 1 MG: 81 TABLET ORAL at 12:30

## 2018-06-10 RX ADMIN — GLIMEPIRIDE 1 MG: 2 TABLET ORAL at 13:05

## 2018-06-10 RX ADMIN — FUROSEMIDE 1 MG: 20 TABLET ORAL at 13:05

## 2018-06-11 LAB
ANION GAP SERPL CALC-SCNC: 3 MMOL/L (ref 6–14)
BLOOD UREA NITROGEN: 16 MG/DL (ref 8–26)
CALCIUM: 8.7 MG/DL (ref 8.5–10.1)
CHLORIDE: 95 MMOL/L (ref 98–107)
CO2 SERPL-SCNC: 39 MMOL/L (ref 21–32)
CREAT SERPL-MCNC: 1 MG/DL (ref 0.7–1.3)
GFR SERPLBLD BASED ON 1.73 SQ M-ARVRAT: 76.2 ML/MIN
GLUCOSE SERPL-MCNC: 162 MG/DL (ref 70–99)
LACTIC ACID: 1.7 MMOL/L (ref 0.4–2)
POTASSIUM SERPL-SCNC: 4.1 MMOL/L (ref 3.5–5.1)
SODIUM: 137 MMOL/L (ref 136–145)

## 2018-06-11 RX ADMIN — LIDOCAINE 1 PATCH: 50 PATCH CUTANEOUS at 08:18

## 2018-06-11 RX ADMIN — FUROSEMIDE 1 MG: 20 TABLET ORAL at 08:15

## 2018-06-11 RX ADMIN — NORTRIPTYLINE HYDROCHLORIDE 1 MG: 25 CAPSULE ORAL at 20:20

## 2018-06-11 RX ADMIN — PANTOPRAZOLE SODIUM 1 MG: 40 TABLET, DELAYED RELEASE ORAL at 08:13

## 2018-06-11 RX ADMIN — ASPIRIN 81 MG 1 MG: 81 TABLET ORAL at 08:14

## 2018-06-11 RX ADMIN — IPRATROPIUM BROMIDE AND ALBUTEROL SULFATE 1 ML: .5; 3 SOLUTION RESPIRATORY (INHALATION) at 08:27

## 2018-06-11 RX ADMIN — TAMSULOSIN HYDROCHLORIDE 1 MG: 0.4 CAPSULE ORAL at 08:15

## 2018-06-11 RX ADMIN — ALBUTEROL SULFATE 1 MG: 108 AEROSOL, METERED RESPIRATORY (INHALATION) at 15:50

## 2018-06-11 RX ADMIN — PANTOPRAZOLE SODIUM 1 MG: 40 TABLET, DELAYED RELEASE ORAL at 18:04

## 2018-06-11 RX ADMIN — GLIMEPIRIDE 1 MG: 2 TABLET ORAL at 08:14

## 2018-06-12 VITALS — SYSTOLIC BLOOD PRESSURE: 111 MMHG

## 2018-06-12 RX ADMIN — LIDOCAINE 1 PATCH: 50 PATCH CUTANEOUS at 10:13

## 2018-06-12 RX ADMIN — ALBUTEROL SULFATE 1 MG: 108 AEROSOL, METERED RESPIRATORY (INHALATION) at 01:01

## 2018-06-12 RX ADMIN — ASPIRIN 81 MG 1 MG: 81 TABLET ORAL at 10:14

## 2018-06-12 RX ADMIN — FUROSEMIDE 1 MG: 20 TABLET ORAL at 10:13

## 2018-06-12 RX ADMIN — PANTOPRAZOLE SODIUM 1 MG: 40 TABLET, DELAYED RELEASE ORAL at 10:14

## 2018-06-12 RX ADMIN — GLIMEPIRIDE 1 MG: 2 TABLET ORAL at 10:14

## 2018-06-12 RX ADMIN — TAMSULOSIN HYDROCHLORIDE 1 MG: 0.4 CAPSULE ORAL at 10:13

## 2018-06-12 RX ADMIN — ALBUTEROL SULFATE 1 MG: 108 AEROSOL, METERED RESPIRATORY (INHALATION) at 11:15

## 2018-06-12 RX ADMIN — ALBUTEROL SULFATE 1 MG: 108 AEROSOL, METERED RESPIRATORY (INHALATION) at 07:35

## 2018-09-28 ENCOUNTER — HOSPITAL ENCOUNTER (OUTPATIENT)
Dept: HOSPITAL 61 - PNCL | Age: 58
Discharge: HOME | End: 2018-09-28
Attending: ANESTHESIOLOGY
Payer: COMMERCIAL

## 2018-09-28 DIAGNOSIS — R25.2: ICD-10-CM

## 2018-09-28 DIAGNOSIS — Z88.8: ICD-10-CM

## 2018-09-28 DIAGNOSIS — Z88.4: ICD-10-CM

## 2018-09-28 DIAGNOSIS — G89.4: Primary | ICD-10-CM

## 2018-09-28 PROCEDURE — 95991 SPIN/BRAIN PUMP REFIL & MAIN: CPT

## 2018-09-28 NOTE — PAIN
DATE OF SERVICE:  09/28/2018



PROGRESS NOTE FOR PAIN CLINIC 



DIAGNOSES:

1.  Chronic pain syndrome.

2.  Spasticity.

3.  Headaches. 



HISTORY OF PRESENT ILLNESS:  The patient is a 58-year-old male who returns for

followup status post intrathecal pump therapy, last seen on May 17th.  The

patient had a pump refill at that time and was doing very well.  The reports

states fairly significant decrease in pain about 70%-75% overall with the

morphine pump.  The patient is not taking any other oral narcotics.  The patient

still has some significant respiratory disease, recently was hospitalized for a

scrotal abscess with I and D.  The patient reports he has finished his

antibiotics today from that last hospital stay.  The patient reports otherwise

the pain is fairly well controlled even when he was in the hospital with the

surgery and so forth.  The patient reports no new motor or sensory deficits and

no new bowel or bladder incontinence.  The patient reports the pain is mainly in

his mid back, low back, upper back, neck, radiating to the left arm at times,

tingling, radiating, becoming more constant.  The patient rates the pain as an 8

on a scale of 10 at its worst, 4 on average and a 3 at its least and is a 4

today.  The patient reports no new motor or sensory deficits or other

complaints.  Again, no specific side effects with the intrathecal pump

medication of morphine.



PHYSICAL EXAMINATION:

VITAL SIGNS:  The patient's blood pressure is 124/67, pulse 111, respirations

16, temperature 98.0 degrees Fahrenheit and weight is 135 pounds.

GENERAL:  The patient is awake, alert, oriented, appropriate and very pleasant

demeanor.  The patient accompanied by his spouse.

HEENT:  Head shows normocephalic and atraumatic.  Extraocular movements are

intact and symmetrical.  Oral cavity:  Mucous membranes moist and pink. 

Dentition is intact.

NECK:  Shows anterior throat supple without palpable lymphadenopathy noted. 

Swallow reflex is symmetrical.

CHEST:  Shows normal on inspection.  Breath sounds are clear to auscultation

bilaterally.

HEART:  Shows S1 and S2 clear.  No murmurs auscultated.

ABDOMEN:  Soft, nontender and nondistended.  No palpable organomegaly is noted. 

Easily palpable intrathecal pump is noted in the left lower quadrant.  The

patient does have a slight umbilical hernia, which is protruding to a mild

extent and is easily reducible and only very mildly tender with reduction of the

hernia.

BACK:  The patient's back shows spine grossly in the midline.  Slight

exaggeration of the thoracic kyphosis and some minor flattening of the lumbar

lordotic curvature.  Tenderness throughout the upper, middle and lower

distribution of the paraspinous musculature throughout the thoracic distribution

as well as a lumbar distribution but only diffusely without specific trigger

points.  The patient shows some mild tenderness with rotational motion of the

cervical spine, both laterally but not with extension, not with forward flexion.

 The low back shows some mild tenderness with rotation as well, extension and

flexion, right and left lateral rotation in all planes essentially.  

EXTREMITIES:  The patient's extremities show upper extremity deep tendon

reflexes at 2+ in the biceps and triceps tendons.  Lower extremities show 1+

patellar and tendo-calcaneus tendons.  Peripheral pulses are 2+ radial and 1+

posterior tibial.  No peripheral edema is noted.  No edema is noted bilaterally

in the lower extremities as well.



Options were discussed with the patient.  The patient's old chart was reviewed

as his current medication regimen reviewed and review of systems reviewed.  We

will proceed with intrathecal pump refill and reprogramming under sterile prep

and drape.  Risks were discussed with the patient including, but not limited to

bleeding, infection, possibility of extravasation of the new medication and

resuscitative measures if necessary as well as poor results regarding pain

control.  The patient understands and wished to proceed.  The patient will

return to the clinic prior to February 13th, which will be his next pump refill

at maximum date.  The patient was counseled as to activity level as well as

medication side effects to be aware of.



DIAGNOSES:

1.  Chronic pain syndrome.

2.  Spasticity.

3.  Chronic headaches.



PROCEDURE:  Intrathecal pump refill and reprogramming under sterile prep and

drape using local anesthetic, topical.  Using a 22-gauge noncutting Vionic

kit needle, the pump was accessed without difficulty.  A 3 mL of the old

medication was withdrawn and discarded and 20 mL of the new medication

containing morphine 50 mg/mL was then replaced in the pump without difficulty. 

The patient tolerated procedure well and had no complications.  Pump was then

reprogrammed for volume and settings as well as rate settings.

 



______________________________

AGUSTÍN DIEZ MD



DR:  FÉLIX/duane  JOB#:  [id number] / [id number]

DD:  09/28/2018 10:54  DT:  09/28/2018 13:00

## 2018-10-08 ENCOUNTER — HOSPITAL ENCOUNTER (INPATIENT)
Dept: HOSPITAL 61 - ER | Age: 58
LOS: 2 days | Discharge: HOME | DRG: 190 | End: 2018-10-10
Attending: FAMILY MEDICINE | Admitting: FAMILY MEDICINE
Payer: COMMERCIAL

## 2018-10-08 VITALS — SYSTOLIC BLOOD PRESSURE: 111 MMHG | DIASTOLIC BLOOD PRESSURE: 79 MMHG

## 2018-10-08 VITALS — DIASTOLIC BLOOD PRESSURE: 80 MMHG | SYSTOLIC BLOOD PRESSURE: 117 MMHG

## 2018-10-08 VITALS — BODY MASS INDEX: 22.51 KG/M2 | WEIGHT: 143.44 LBS | HEIGHT: 67 IN

## 2018-10-08 VITALS — DIASTOLIC BLOOD PRESSURE: 80 MMHG | SYSTOLIC BLOOD PRESSURE: 145 MMHG

## 2018-10-08 VITALS — SYSTOLIC BLOOD PRESSURE: 141 MMHG | DIASTOLIC BLOOD PRESSURE: 76 MMHG

## 2018-10-08 VITALS — DIASTOLIC BLOOD PRESSURE: 82 MMHG | SYSTOLIC BLOOD PRESSURE: 124 MMHG

## 2018-10-08 DIAGNOSIS — Z87.01: ICD-10-CM

## 2018-10-08 DIAGNOSIS — K21.9: ICD-10-CM

## 2018-10-08 DIAGNOSIS — Z99.81: ICD-10-CM

## 2018-10-08 DIAGNOSIS — F41.9: ICD-10-CM

## 2018-10-08 DIAGNOSIS — Z66: ICD-10-CM

## 2018-10-08 DIAGNOSIS — J96.20: ICD-10-CM

## 2018-10-08 DIAGNOSIS — R10.32: ICD-10-CM

## 2018-10-08 DIAGNOSIS — Z91.041: ICD-10-CM

## 2018-10-08 DIAGNOSIS — G89.29: ICD-10-CM

## 2018-10-08 DIAGNOSIS — Z88.8: ICD-10-CM

## 2018-10-08 DIAGNOSIS — J44.1: Primary | ICD-10-CM

## 2018-10-08 DIAGNOSIS — J96.11: ICD-10-CM

## 2018-10-08 DIAGNOSIS — Z79.899: ICD-10-CM

## 2018-10-08 DIAGNOSIS — F17.200: ICD-10-CM

## 2018-10-08 DIAGNOSIS — T40.605A: ICD-10-CM

## 2018-10-08 DIAGNOSIS — E11.9: ICD-10-CM

## 2018-10-08 DIAGNOSIS — K59.03: ICD-10-CM

## 2018-10-08 LAB
ALBUMIN SERPL-MCNC: 3.4 G/DL (ref 3.4–5)
ALBUMIN/GLOB SERPL: 0.9 {RATIO} (ref 1–1.7)
ALP SERPL-CCNC: 62 U/L (ref 46–116)
ALT SERPL-CCNC: 45 U/L (ref 16–63)
ANION GAP SERPL CALC-SCNC: 4 MMOL/L (ref 6–14)
APTT BLD: 21 SEC (ref 24–38)
APTT PPP: YELLOW S
AST SERPL-CCNC: 33 U/L (ref 15–37)
BACTERIA #/AREA URNS HPF: 0 /HPF
BASOPHILS # BLD AUTO: 0.1 X10^3/UL (ref 0–0.2)
BASOPHILS NFR BLD: 1 % (ref 0–3)
BILIRUB SERPL-MCNC: 0.3 MG/DL (ref 0.2–1)
BILIRUB UR QL STRIP: NEGATIVE
BUN SERPL-MCNC: 11 MG/DL (ref 8–26)
BUN/CREAT SERPL: 14 (ref 6–20)
CALCIUM SERPL-MCNC: 9.6 MG/DL (ref 8.5–10.1)
CHLORIDE SERPL-SCNC: 98 MMOL/L (ref 98–107)
CK SERPL-CCNC: 120 U/L (ref 39–308)
CO2 SERPL-SCNC: 39 MMOL/L (ref 21–32)
CREAT SERPL-MCNC: 0.8 MG/DL (ref 0.7–1.3)
EOSINOPHIL NFR BLD: 0 % (ref 0–3)
EOSINOPHIL NFR BLD: 0 X10^3/UL (ref 0–0.7)
ERYTHROCYTE [DISTWIDTH] IN BLOOD BY AUTOMATED COUNT: 18 % (ref 11.5–14.5)
FIBRINOGEN PPP-MCNC: CLEAR MG/DL
GFR SERPLBLD BASED ON 1.73 SQ M-ARVRAT: 98.6 ML/MIN
GLOBULIN SER-MCNC: 3.8 G/DL (ref 2.2–3.8)
GLUCOSE SERPL-MCNC: 134 MG/DL (ref 70–99)
HCT VFR BLD CALC: 38.7 % (ref 39–53)
HGB BLD-MCNC: 13.2 G/DL (ref 13–17.5)
LYMPHOCYTES # BLD: 1.6 X10^3/UL (ref 1–4.8)
LYMPHOCYTES NFR BLD AUTO: 19 % (ref 24–48)
MAGNESIUM SERPL-MCNC: 1.8 MG/DL (ref 1.8–2.4)
MCH RBC QN AUTO: 32 PG (ref 25–35)
MCHC RBC AUTO-ENTMCNC: 34 G/DL (ref 31–37)
MCV RBC AUTO: 94 FL (ref 79–100)
MONO #: 0.5 X10^3/UL (ref 0–1.1)
MONOCYTES NFR BLD: 6 % (ref 0–9)
NEUT #: 6.2 X10^3UL (ref 1.8–7.7)
NEUTROPHILS NFR BLD AUTO: 73 % (ref 31–73)
NITRITE UR QL STRIP: NEGATIVE
PH UR STRIP: 5.5 [PH]
PLATELET # BLD AUTO: 180 X10^3/UL (ref 140–400)
POTASSIUM SERPL-SCNC: 5.1 MMOL/L (ref 3.5–5.1)
PROT SERPL-MCNC: 7.2 G/DL (ref 6.4–8.2)
PROT UR STRIP-MCNC: NEGATIVE MG/DL
PROTHROMBIN TIME: 12.1 SEC (ref 11.7–14)
RBC # BLD AUTO: 4.13 X10^6/UL (ref 4.3–5.7)
RBC #/AREA URNS HPF: 0 /HPF (ref 0–2)
SODIUM SERPL-SCNC: 141 MMOL/L (ref 136–145)
SQUAMOUS #/AREA URNS LPF: (no result) /LPF
UROBILINOGEN UR-MCNC: 0.2 MG/DL
WBC # BLD AUTO: 8.4 X10^3/UL (ref 4–11)
WBC #/AREA URNS HPF: 0 /HPF (ref 0–4)

## 2018-10-08 PROCEDURE — 94640 AIRWAY INHALATION TREATMENT: CPT

## 2018-10-08 PROCEDURE — 82553 CREATINE MB FRACTION: CPT

## 2018-10-08 PROCEDURE — 82962 GLUCOSE BLOOD TEST: CPT

## 2018-10-08 PROCEDURE — 84484 ASSAY OF TROPONIN QUANT: CPT

## 2018-10-08 PROCEDURE — 71046 X-RAY EXAM CHEST 2 VIEWS: CPT

## 2018-10-08 PROCEDURE — 80053 COMPREHEN METABOLIC PANEL: CPT

## 2018-10-08 PROCEDURE — 94760 N-INVAS EAR/PLS OXIMETRY 1: CPT

## 2018-10-08 PROCEDURE — 83880 ASSAY OF NATRIURETIC PEPTIDE: CPT

## 2018-10-08 PROCEDURE — 85025 COMPLETE CBC W/AUTO DIFF WBC: CPT

## 2018-10-08 PROCEDURE — 81001 URINALYSIS AUTO W/SCOPE: CPT

## 2018-10-08 PROCEDURE — 85730 THROMBOPLASTIN TIME PARTIAL: CPT

## 2018-10-08 PROCEDURE — 83605 ASSAY OF LACTIC ACID: CPT

## 2018-10-08 PROCEDURE — 36415 COLL VENOUS BLD VENIPUNCTURE: CPT

## 2018-10-08 PROCEDURE — 74176 CT ABD & PELVIS W/O CONTRAST: CPT

## 2018-10-08 PROCEDURE — 87641 MR-STAPH DNA AMP PROBE: CPT

## 2018-10-08 PROCEDURE — 93005 ELECTROCARDIOGRAM TRACING: CPT

## 2018-10-08 PROCEDURE — 83735 ASSAY OF MAGNESIUM: CPT

## 2018-10-08 PROCEDURE — 85610 PROTHROMBIN TIME: CPT

## 2018-10-08 RX ADMIN — BACLOFEN SCH MG: 10 TABLET ORAL at 20:43

## 2018-10-08 RX ADMIN — ACETAMINOPHEN PRN MG: 325 TABLET, FILM COATED ORAL at 09:14

## 2018-10-08 RX ADMIN — PANTOPRAZOLE SODIUM SCH MG: 40 TABLET, DELAYED RELEASE ORAL at 17:09

## 2018-10-08 RX ADMIN — BACLOFEN SCH MG: 10 TABLET ORAL at 09:14

## 2018-10-08 RX ADMIN — ACETAMINOPHEN PRN MG: 325 TABLET, FILM COATED ORAL at 17:10

## 2018-10-08 RX ADMIN — FUROSEMIDE SCH MG: 20 TABLET ORAL at 09:14

## 2018-10-08 RX ADMIN — NORTRIPTYLINE HYDROCHLORIDE SCH MG: 25 CAPSULE ORAL at 20:43

## 2018-10-08 RX ADMIN — PANTOPRAZOLE SODIUM SCH MG: 40 TABLET, DELAYED RELEASE ORAL at 09:13

## 2018-10-08 RX ADMIN — IPRATROPIUM BROMIDE AND ALBUTEROL SULFATE SCH ML: .5; 3 SOLUTION RESPIRATORY (INHALATION) at 11:51

## 2018-10-08 RX ADMIN — IPRATROPIUM BROMIDE AND ALBUTEROL SULFATE SCH ML: .5; 3 SOLUTION RESPIRATORY (INHALATION) at 15:42

## 2018-10-08 RX ADMIN — ASPIRIN 81 MG SCH MG: 81 TABLET ORAL at 09:13

## 2018-10-08 RX ADMIN — TAMSULOSIN HYDROCHLORIDE SCH MG: 0.4 CAPSULE ORAL at 09:13

## 2018-10-08 RX ADMIN — GLIMEPIRIDE SCH MG: 2 TABLET ORAL at 09:13

## 2018-10-08 RX ADMIN — ACETAMINOPHEN PRN MG: 325 TABLET, FILM COATED ORAL at 23:44

## 2018-10-08 RX ADMIN — IPRATROPIUM BROMIDE AND ALBUTEROL SULFATE SCH ML: .5; 3 SOLUTION RESPIRATORY (INHALATION) at 19:10

## 2018-10-08 RX ADMIN — POLYETHYLENE GLYCOL 3350 SCH GM: 17 POWDER, FOR SOLUTION ORAL at 09:15

## 2018-10-08 NOTE — RAD
Chest PA and lateral:

 

Reason for examination: Dyspnea.

 

Comparison is made to previous study dated 6/9/2018.

 

The heart size is normal. Mediastinum is unremarkable. Lung fields are 

clear. No acute bony abnormalities are seen.

 

Impression:

 

No acute cardiopulmonary disease.

 

Electronically signed by: Hazel Schmitz MD (10/8/2018 5:25 AM) [facility]

## 2018-10-08 NOTE — CONS
DATE OF CONSULTATION:  10/08/2018



PULMONARY CONSULTATION



HISTORY OF PRESENT ILLNESS:  This is a 58-year-old male who is referred for

evaluation of COPD.  He has a long history of smoking tobacco.  Over most of the

years that he smoked he consumed multiple packs of cigarettes per day.  At

present, he is smoking approximately 5 cigarettes per day.  His home therapy

consists of oxygen, p.r.n. albuterol inhaler and a nebulizer.  He is unsure

whether his nebulizer has albuterol or albuterol plus ipratropium.  He also is

on Advair, but only takes that sporadically because he is unsure whether it

helps him.  He was in his usual state of health when he states that his

breathing became worse.  He attributes that to back and flank pain that he was

having.  He did not have any toxic irritant exposures.  He has not had any

fever.  He does have a chronic cough productive of clear or white sputum and

that is unchanged.  His last admission for COPD exacerbation was approximately 4

months ago per his history.  He is DNR.



PAST MEDICAL HISTORY:  Otherwise unremarkable.



SOCIAL HISTORY:  See above, tobacco smoke exposure.  He lives at home and is

unemployed.



FAMILY HISTORY:  Unremarkable.



REVIEW OF SYSTEMS:  Positive primarily for his left flank pain.  Otherwise, a

12-point review of systems was negative.



PHYSICAL EXAMINATION:

GENERAL:  Reveals a male in mild respiratory distress.

VITAL SIGNS:  He is afebrile.  His heart rate is 93 per minute and regular.  His

respiratory rate is 20 per minute with a prolonged expiratory phase, but without

accessory muscle use.  He is able to speak in full sentences for the most part. 

His blood pressure is 111/79.  His oxygen saturation is 92% on 2.5 liters of

oxygen.

HEENT:  Unremarkable.

NECK:  There is no JVD or lymphadenopathy.

CHEST:  He does have some thoracic curvature.  He has significantly decreased

breath sounds in all lung fields with occasional wheezes.  There are no rhonchi

or rales.

CARDIOVASCULAR:  He has a regular rate and rhythm without murmur or gallop.

ABDOMEN:  Soft, without masses or organomegaly.

EXTREMITIES:  There is trace pretibial edema.  There is no cyanosis.



LABORATORY DATA:  His chest x-ray was remarkable for hyperexpansion.  There were

no acute infiltrates.



IMPRESSION:  Chronic respiratory failure secondary to chronic obstructive

pulmonary disease from tobacco smoke exposure.  He has an acute exacerbation.



PLAN:  I agree with a short course of systemic steroids for 5-7 days.  I also

agree with nebulized albuterol plus ipratropium.  I agree with supplemental

oxygen to keep his oxygen saturation above 92%.



Longterm, I discussed with the patient the extreme importance and techniques of

tobacco cessation, this is most important aspect of his care.



With regard to his medications at home, I would suggest that Advair is

appropriate.  I would have him on low dose inhaled steroids and use Advair

100/50.  I discussed with the patient that he should not expect an immediate

improvement with his Advair.  I also reviewed metered dose inhaler technique

with him and the importance of rinsing, gargling and spitting.  In addition to

the Advair, I would use nebulized albuterol plus ipratropium on a p.r.n. basis

up to 4 times a day.  Alternately, we would add a long-acting anticholinergic

and remove the ipratropium from his nebulizer treatment.



Thank you for referring this nice gentleman.  If you have any questions, please

do not hesitate to contact me.

 



______________________________

MARK CONN MD



DR:  ALISHA/duane  JOB#:  [id number] / [id number]

DD:  10/08/2018 17:54  DT:  10/08/2018 18:39

## 2018-10-08 NOTE — EKG
[facility]

              [address] [place] [address]

Test Date:    2018-10-08               Test Time:    03:18:12

Pat Name:     ZACHARY LENZ          Department:   

Patient ID:   [id number]           Room:          

Gender:       M                        Technician:   

:          1960               Requested By: MARIANO BLANTON

Order Number: [id number]           Reading MD:   Noé Donohue MD

                                 Measurements

Intervals                              Axis          

Rate:         100                      P:            90

NV:           108                      QRS:          88

QRSD:         92                       T:            64

QT:           334                                    

QTc:          434                                    

                           Interpretive Statements

SINUS RHYTHM



Electronically Signed On 10- 12:19:15 CDT by Noé Donohue MD

## 2018-10-08 NOTE — PHYS DOC
Past Medical History


Past Medical History:  Anxiety, COPD, Diabetes-Type II, GERD, Pneumonia, Seizure

, Other


Additional Past Medical Histor:  Bronchitis, RSD, back pain


Past Surgical History:  Other


Additional Past Surgical Histo:  PAIN PUMP, CYSTS REMOVED


Smoking:  Less than 1pk/day


Alcohol Use:  Rarely


Drug Use:  None





Adult General


Chief Complaint


Chief Complaint:  Shortness of breath





HPI


HPI


58-year-old male with past medical history of COPD who is on 2.5 L nasal 

cannula of supplemental O2 at baseline presents with 4 day history of 

progressive dyspnea. Patient also reports some left sided abdominal muscle 

spasms near his morphine pump. Denies known trauma. Denies fever/chills. EMS 

called to patient's residence and noted patient down to 85% on his home O2. EMS 

reports riding a DuoNeb treatment which brought him up to 100%. Denies leg 

swelling or calf tenderness.





Review of Systems


Review of Systems





Constitutional: Denies fever or chills; reports generalized malaise


Eyes: Denies change in visual acuity, redness, or eye pain []


HENT: Denies nasal congestion or sore throat []


Respiratory: Reports cough, wheezing, and shortness of breath


Cardiovascular: Denies chest pain or palpitations


GI: Denies abdominal pain, nausea, vomiting,  or diarrhea []


: Denies dysuria or hematuria []


Musculoskeletal: Denies back pain or joint pain []


Integument: Denies rash or skin lesions []


Neurologic: Denies headache, focal weakness or sensory changes []


Complete systems were reviewed and found to be within normal limits, except as 

documented in this note.





Current Medications


Current Medications





Current Medications








 Medications


  (Trade)  Dose


 Ordered  Sig/Garrick  Start Time


 Stop Time Status Last Admin


Dose Admin


 


 Albuterol/


 Ipratropium


  (Duoneb)  3 ml  1X  ONCE  10/8/18 04:30


 10/8/18 04:45 DC 10/8/18 04:19


3 ML


 


 Aspirin


  (Emely Aspirin)  325 mg  1X  ONCE  10/8/18 05:00


 10/8/18 05:01   





 


 Dexamethasone


 Sodium Phosphate


  (Decadron)  10 mg  1X  ONCE  10/8/18 03:30


 10/8/18 03:34 DC 10/8/18 04:46


10 MG


 


 Lorazepam


  (Ativan)  0.5 mg  1X  ONCE  10/8/18 03:30


 10/8/18 03:34 DC 10/8/18 04:46


0.5 MG











Allergies


Allergies





Allergies








Coded Allergies Type Severity Reaction Last Updated Verified


 


  iodine Allergy Severe Hives 1/25/18 Yes


 


  midazolam Allergy Severe Anaphylaxis 3/16/17 Yes


 


  paroxetine Allergy Severe seizures 3/16/17 Yes


 


  I S O L A T I O N *CONTACT* Allergy Unknown  1/26/18 Yes











Physical Exam


Physical Exam





Constitutional: Well developed, well nourished, no acute distress, non-toxic 

appearance. []


HENT: Normocephalic, atraumatic,


Eyes: PERRL, EOMI, conjunctiva normal


Neck: Normal range of motion, no tenderness, supple, no stridor. [] 


Cardiovascular: Heart rate regular rhythm, no murmur []


Lungs & Thorax: Diffuse wheezing throughout, diminished aeration at bases


Abdomen: Soft, no tenderness, left lower quadrant pain pump noted


Skin: Warm, dry, no erythema, no rash. [] 


Back: No tenderness, no CVA tenderness. [] 


Extremities: No tenderness, ROM intact, no edema. [] 


Neurologic: Alert and oriented X 3, normal motor function, normal sensory 

function, no focal deficits noted. []


Psychologic: Affect normal, judgement normal, mood normal. []





Current Patient Data


Vital Signs





 Vital Signs








  Date Time  Temp Pulse Resp B/P (MAP) Pulse Ox O2 Delivery O2 Flow Rate FiO2


 


10/8/18 04:20     93 Nasal Cannula 4.0 


 


10/8/18 03:02 98.7 102 18 137/79 (98)    





 98.7       








Lab Values





 Laboratory Tests








Test


 10/8/18


03:21


 


White Blood Count


 8.4 x10^3/uL


(4.0-11.0)


 


Red Blood Count


 4.13 x10^6/uL


(4.30-5.70)  L


 


Hemoglobin


 13.2 g/dL


(13.0-17.5)


 


Hematocrit


 38.7 %


(39.0-53.0)  L


 


Mean Corpuscular Volume


 94 fL ()





 


Mean Corpuscular Hemoglobin 32 pg (25-35)  


 


Mean Corpuscular Hemoglobin


Concent 34 g/dL


(31-37)


 


Red Cell Distribution Width


 18.0 %


(11.5-14.5)  H


 


Platelet Count


 180 x10^3/uL


(140-400)


 


Neutrophils (%) (Auto) 73 % (31-73)  


 


Lymphocytes (%) (Auto) 19 % (24-48)  L


 


Monocytes (%) (Auto) 6 % (0-9)  


 


Eosinophils (%) (Auto) 0 % (0-3)  


 


Basophils (%) (Auto) 1 % (0-3)  


 


Neutrophils # (Auto)


 6.2 x10^3uL


(1.8-7.7)


 


Lymphocytes # (Auto)


 1.6 x10^3/uL


(1.0-4.8)


 


Monocytes # (Auto)


 0.5 x10^3/uL


(0.0-1.1)


 


Eosinophils # (Auto)


 0.0 x10^3/uL


(0.0-0.7)


 


Basophils # (Auto)


 0.1 x10^3/uL


(0.0-0.2)


 


Prothrombin Time


 12.1 SEC


(11.7-14.0)


 


Prothrombin Time INR 0.9 (0.8-1.1)  


 


PTT


 21 SEC (24-38)


L


 


Sodium Level


 141 mmol/L


(136-145)


 


Potassium Level


 5.1 mmol/L


(3.5-5.1)


 


Chloride Level


 98 mmol/L


()


 


Carbon Dioxide Level


 39 mmol/L


(21-32)  H


 


Anion Gap 4 (6-14)  L


 


Blood Urea Nitrogen


 11 mg/dL


(8-26)


 


Creatinine


 0.8 mg/dL


(0.7-1.3)


 


Estimated GFR


(Cockcroft-Gault) 98.6  





 


BUN/Creatinine Ratio 14 (6-20)  


 


Glucose Level


 134 mg/dL


(70-99)  H


 


Lactic Acid Level


 2.0 mmol/L


(0.4-2.0)


 


Calcium Level


 9.6 mg/dL


(8.5-10.1)


 


Magnesium Level


 1.8 mg/dL


(1.8-2.4)


 


Total Bilirubin


 0.3 mg/dL


(0.2-1.0)


 


Aspartate Amino Transferase


(AST) 33 U/L (15-37)





 


Alanine Aminotransferase (ALT)


 45 U/L (16-63)





 


Alkaline Phosphatase


 62 U/L


()


 


Creatine Kinase


 120 U/L


()


 


Creatine Kinase MB (Mass)


 6.4 ng/mL


(0.0-3.6)  H


 


Creatine Kinase MB Relative


Index 5.3 % (0-4)  H





 


Troponin I Quantitative


 < 0.017 ng/mL


(0.000-0.055)


 


NT-Pro-B-Type Natriuretic


Peptide 507 pg/mL


(0-124)  H


 


Total Protein


 7.2 g/dL


(6.4-8.2)


 


Albumin


 3.4 g/dL


(3.4-5.0)


 


Albumin/Globulin Ratio


 0.9 (1.0-1.7)


L





 Laboratory Tests


10/8/18 03:21








 Laboratory Tests


10/8/18 03:21











EKG


EKG


@0318, NSR at 100bpm, nonspecific t wave inversion V1-V2, QRS 92ms, QT/QTc 334/

434ms





Radiology/Procedures


Radiology/Procedures


CXR 2 view (preliminary interpretation by ED Physician):  No acute process, 

Pulmonary fibrosis/scarring noted which appear stable





Course & Med Decision Making


Course & Med Decision Making


Pertinent Labs and Imaging studies reviewed. (See chart for details)





Patient presents with 4 day history of progressive dyspnea and cough with past 

medical history of COPD. EMS also noted patient hypoxic down to 85% on his 

baseline supplemental O2. EMS provided DuoNeb with interval improvement of 

sats. Labs obtained and posted to chart. Initial troponin within normal limits. 

EKG stable. Chest x-ray stable. Patient requiring admission for further 

evaluation and treatment. Discussed with Dr. Walker (hospitalist) who is in 

agreement with admission. Discussed findings and plan with patient, who 

acknowledges understanding and agreement.





Dragon Disclaimer


Dragon Disclaimer


This electronic medical record was generated, in whole or in part, using a 

voice recognition dictation system.





Departure


Departure


Impression:  


 Primary Impression:  


 COPD exacerbation


 Additional Impression:  


 Hypoxia


Disposition:  09 ADMITTED AS INPATIENT


Admitting Physician:  Tressa Crain


Condition:  GUARDED


Referrals:  


UNKNOWN PCP NAME (PCP)





Critical Care Time


Critical care time was 30 minutes which includes time at bedside, spent in 

discussion of patient's care with specialists and/or family members, with 

interpretation of laboratory and/or radiological studies and is exclusive of 

procedures.





Problem Qualifiers











MARIANO BLANTON DO Oct 8, 2018 03:14

## 2018-10-08 NOTE — HP
ADMIT DATE:  10/08/2018



CHIEF COMPLAINT:  Shortness of breath.



HISTORY OF PRESENT ILLNESS:  A 58-year-old white male with end-stage COPD on

oxygen and is also still heavy smoker at home and came here with shortness of

breath.  Chest x-ray was clear and his pO2 was somewhat low and he was admitted

with respiratory treatments, steroids and supportive care.  He has denied any

sputum production, has no other specific complaints at this time.  He has been

bothered by constipation recently and has been taking more MiraLax with good

results.



PAST MEDICAL HISTORY:  Multiple admissions for the same problem.  He is a DNR

patient.



ALLERGIES:  VERSED AND PAXIL.



SOCIAL HISTORY:  Still smokes moderately at home.  , disabled, very

physically inactive, nondrinker.



FAMILY HISTORY:  Unremarkable.



REVIEW OF SYSTEMS:  No other complaints.



OBJECTIVE:

ENT:  All within normal limits.

NECK:  No masses, nodes or bruits.

LUNGS:  Decreased breath sounds, coarse expiratory rhonchi and wheezes.

CARDIOVASCULAR:  Regular rate.  No irregular beat or murmur.

ABDOMEN:  Soft, benign and nontender.

EXTREMITIES:  Good skin turgor, muscle mass, diminished pedal pulses, diminished

2+ clubbing.

NEUROLOGIC:  Alert, appropriate, responsive normal for him.



ASSESSMENT:  Exacerbation of chronic obstructive pulmonary disease, end-stage. 

Continued tobacco abuse, chronic nonmalignant pain disorder with spinal morphine

pump in place.



PLAN:  DNR and supportive care.  Expect a short stay.

 



______________________________

MONIKA AHUMADA MD



DR:  SANJUANITA/dunae  JOB#:  [id number] / [id number]

DD:  10/08/2018 08:20  DT:  10/08/2018 08:31

## 2018-10-09 VITALS — DIASTOLIC BLOOD PRESSURE: 70 MMHG | SYSTOLIC BLOOD PRESSURE: 109 MMHG

## 2018-10-09 VITALS — DIASTOLIC BLOOD PRESSURE: 79 MMHG | SYSTOLIC BLOOD PRESSURE: 143 MMHG

## 2018-10-09 VITALS — SYSTOLIC BLOOD PRESSURE: 122 MMHG | DIASTOLIC BLOOD PRESSURE: 78 MMHG

## 2018-10-09 VITALS — SYSTOLIC BLOOD PRESSURE: 130 MMHG | DIASTOLIC BLOOD PRESSURE: 81 MMHG

## 2018-10-09 VITALS — DIASTOLIC BLOOD PRESSURE: 63 MMHG | SYSTOLIC BLOOD PRESSURE: 113 MMHG

## 2018-10-09 VITALS — SYSTOLIC BLOOD PRESSURE: 113 MMHG | DIASTOLIC BLOOD PRESSURE: 65 MMHG

## 2018-10-09 RX ADMIN — PANTOPRAZOLE SODIUM SCH MG: 40 TABLET, DELAYED RELEASE ORAL at 17:21

## 2018-10-09 RX ADMIN — TAMSULOSIN HYDROCHLORIDE SCH MG: 0.4 CAPSULE ORAL at 08:30

## 2018-10-09 RX ADMIN — GLIMEPIRIDE SCH MG: 2 TABLET ORAL at 08:30

## 2018-10-09 RX ADMIN — FUROSEMIDE SCH MG: 20 TABLET ORAL at 08:29

## 2018-10-09 RX ADMIN — NORTRIPTYLINE HYDROCHLORIDE SCH MG: 25 CAPSULE ORAL at 21:16

## 2018-10-09 RX ADMIN — POLYETHYLENE GLYCOL 3350 SCH GM: 17 POWDER, FOR SOLUTION ORAL at 08:29

## 2018-10-09 RX ADMIN — IPRATROPIUM BROMIDE AND ALBUTEROL SULFATE SCH ML: .5; 3 SOLUTION RESPIRATORY (INHALATION) at 15:33

## 2018-10-09 RX ADMIN — IPRATROPIUM BROMIDE AND ALBUTEROL SULFATE SCH ML: .5; 3 SOLUTION RESPIRATORY (INHALATION) at 07:29

## 2018-10-09 RX ADMIN — BACLOFEN SCH MG: 10 TABLET ORAL at 21:16

## 2018-10-09 RX ADMIN — PANTOPRAZOLE SODIUM SCH MG: 40 TABLET, DELAYED RELEASE ORAL at 08:30

## 2018-10-09 RX ADMIN — ASPIRIN 81 MG SCH MG: 81 TABLET ORAL at 08:29

## 2018-10-09 RX ADMIN — IPRATROPIUM BROMIDE AND ALBUTEROL SULFATE SCH ML: .5; 3 SOLUTION RESPIRATORY (INHALATION) at 11:07

## 2018-10-09 RX ADMIN — BACLOFEN SCH MG: 10 TABLET ORAL at 08:30

## 2018-10-09 RX ADMIN — IPRATROPIUM BROMIDE AND ALBUTEROL SULFATE SCH ML: .5; 3 SOLUTION RESPIRATORY (INHALATION) at 20:00

## 2018-10-09 NOTE — RAD
CT of the abdomen and pelvis without contrast, 10/9/2018:

 

HISTORY: Left lower quadrant pain

 

No IV contrast was administered to the given history of an iodine allergy.

Also, no oral contrast material was administered, limiting evaluation of 

the GI tract.

 

There are emphysematous changes in the lungs with left basilar 

bronchiectasis, as noted on the CT study 1/25/2018. There are unchanged 

posteromedial left basilar opacities compatible with scarring versus 

chronic inflammation.

 

The unopacified liver is unremarkable. No gallbladder abnormality is seen.

The pancreas is unremarkable. The spleen is of normal size. There is 

bilateral renal cortical scarring. The kidneys show no evidence of 

obstruction.

 

There is moderate aortoiliac calcific plaquing without evidence of 

aneurysm. An electronic device compatible with an infusion pump is evident

in the subcutaneous soft tissues of the left pelvis anterior laterally 

with a catheter or lead extending into the thoracic spinal canal. 

Associated artifacts degrade image quality in a portion of the pelvis. No 

abdominal or pelvic adenopathy is seen. The bladder is mildly distended.

 

There is a moderate amount of gas and stool in the proximal colon 

extending up through the level the transverse colon. There is a lesser 

amount stool and gas in the descending colon and rectosigmoid. No 

obstructing mass is evident. The small bowel loops are unremarkable. There

appears to be mural thickening involving the proximal and mid stomach. 

Evaluation of the unopacified stomach and GI tract is limited in the 

absence of GI tract contrast. No free air or free fluid is evident in the 

abdomen or pelvis.

 

 

IMPRESSION:

1. Increased gas and stool in the right colon extending to the mid sigmoid

level. No obstructing process is seen. The findings are probably due to an

atonic colon/constipation

2. Apparent mural thickening involving the proximal and mid stomach. This 

could be on an inflammatory or neoplastic basis.

3. Emphysema with bronchiectasis and chronic infiltrate/scarring in the 

left base.

 

 

 

PQRS Compliance Statement:

 

One or more of the following individualized dose reduction techniques were

utilized for this examination:

1. Automated exposure control

2. Adjustment of the mA and/or kV according to patient size

3. Use of iterative reconstruction technique

 

Electronically signed by: Rick Moritz, MD (10/9/2018 11:13 AM) [facility]

## 2018-10-09 NOTE — PDOC
PULMONARY PROGRESS NOTES


Subjective


PT STILL SOA STILL SMOKING


Vitals





Vital Signs








  Date Time  Temp Pulse Resp B/P (MAP) Pulse Ox O2 Delivery O2 Flow Rate FiO2


 


10/9/18 07:30 98.1 81 20 113/65 (81) 95 Nasal Cannula 3.0 





 98.1       








ROS:  No Nausea, No Abdominal Pain, No Increase Cough


General:  Alert


Lungs:  Wheezing, Crackles


Cardiovascular:  S1, S2


Abdomen:  Soft, Non-tender


Neuro Exam:  Alert


Extremities:  No Edema


Skin:  Warm


Labs





Laboratory Tests








Test


 10/8/18


03:21 10/8/18


05:55 10/8/18


07:55 10/8/18


08:25


 


White Blood Count


 8.4 x10^3/uL


(4.0-11.0) 


 


 





 


Red Blood Count


 4.13 x10^6/uL


(4.30-5.70) 


 


 





 


Hemoglobin


 13.2 g/dL


(13.0-17.5) 


 


 





 


Hematocrit


 38.7 %


(39.0-53.0) 


 


 





 


Mean Corpuscular Volume 94 fL ()    


 


Mean Corpuscular Hemoglobin 32 pg (25-35)    


 


Mean Corpuscular Hemoglobin


Concent 34 g/dL


(31-37) 


 


 





 


Red Cell Distribution Width


 18.0 %


(11.5-14.5) 


 


 





 


Platelet Count


 180 x10^3/uL


(140-400) 


 


 





 


Neutrophils (%) (Auto) 73 % (31-73)    


 


Lymphocytes (%) (Auto) 19 % (24-48)    


 


Monocytes (%) (Auto) 6 % (0-9)    


 


Eosinophils (%) (Auto) 0 % (0-3)    


 


Basophils (%) (Auto) 1 % (0-3)    


 


Neutrophils # (Auto)


 6.2 x10^3uL


(1.8-7.7) 


 


 





 


Lymphocytes # (Auto)


 1.6 x10^3/uL


(1.0-4.8) 


 


 





 


Monocytes # (Auto)


 0.5 x10^3/uL


(0.0-1.1) 


 


 





 


Eosinophils # (Auto)


 0.0 x10^3/uL


(0.0-0.7) 


 


 





 


Basophils # (Auto)


 0.1 x10^3/uL


(0.0-0.2) 


 


 





 


Prothrombin Time


 12.1 SEC


(11.7-14.0) 


 


 





 


Prothromb Time International


Ratio 0.9 (0.8-1.1) 


 


 


 





 


Activated Partial


Thromboplast Time 21 SEC (24-38) 


 


 


 





 


Sodium Level


 141 mmol/L


(136-145) 


 


 





 


Potassium Level


 5.1 mmol/L


(3.5-5.1) 


 


 





 


Chloride Level


 98 mmol/L


() 


 


 





 


Carbon Dioxide Level


 39 mmol/L


(21-32) 


 


 





 


Anion Gap 4 (6-14)    


 


Blood Urea Nitrogen


 11 mg/dL


(8-26) 


 


 





 


Creatinine


 0.8 mg/dL


(0.7-1.3) 


 


 





 


Estimated GFR


(Cockcroft-Gault) 98.6 


 


 


 





 


BUN/Creatinine Ratio 14 (6-20)    


 


Glucose Level


 134 mg/dL


(70-99) 


 


 





 


Lactic Acid Level


 2.0 mmol/L


(0.4-2.0) 


 


 





 


Calcium Level


 9.6 mg/dL


(8.5-10.1) 


 


 





 


Magnesium Level


 1.8 mg/dL


(1.8-2.4) 


 


 





 


Total Bilirubin


 0.3 mg/dL


(0.2-1.0) 


 


 





 


Aspartate Amino Transf


(AST/SGOT) 33 U/L (15-37) 


 


 


 





 


Alanine Aminotransferase


(ALT/SGPT) 45 U/L (16-63) 


 


 


 





 


Alkaline Phosphatase


 62 U/L


() 


 


 





 


Creatine Kinase


 120 U/L


() 


 


 





 


Creatine Kinase MB (Mass)


 6.4 ng/mL


(0.0-3.6) 


 


 





 


Creatine Kinase MB Relative


Index 5.3 % (0-4) 


 


 


 





 


Troponin I Quantitative


 < 0.017 ng/mL


(0.000-0.055) 


 < 0.017 ng/mL


(0.000-0.055) 





 


NT-Pro-B-Type Natriuretic


Peptide 507 pg/mL


(0-124) 


 


 





 


Total Protein


 7.2 g/dL


(6.4-8.2) 


 


 





 


Albumin


 3.4 g/dL


(3.4-5.0) 


 


 





 


Albumin/Globulin Ratio 0.9 (1.0-1.7)    


 


Urine Collection Type  Unknown   


 


Urine Color  Yellow   


 


Urine Clarity  Clear   


 


Urine pH  5.5   


 


Urine Specific Gravity  1.010   


 


Urine Protein


 


 Negative mg/dL


(NEG-TRACE) 


 





 


Urine Glucose (UA)


 


 Negative mg/dL


(NEG) 


 





 


Urine Ketones (Stick)


 


 Negative mg/dL


(NEG) 


 





 


Urine Blood  Negative (NEG)   


 


Urine Nitrite  Negative (NEG)   


 


Urine Bilirubin  Negative (NEG)   


 


Urine Urobilinogen Dipstick


 


 0.2 mg/dL (0.2


mg/dL) 


 





 


Urine Leukocyte Esterase  Negative (NEG)   


 


Urine RBC  0 /HPF (0-2)   


 


Urine WBC  0 /HPF (0-4)   


 


Urine Squamous Epithelial


Cells 


 Few /LPF 


 


 





 


Urine Bacteria  0 /HPF (0-FEW)   


 


Glucose (Fingerstick)


 


 


 


 245 mg/dL


(70-99)


 


Test


 10/8/18


09:00 10/8/18


10:35 10/8/18


16:35 10/8/18


20:40


 


Nasal Screen MRSA (PCR)


 Positive


(Negative) 


 


 





 


Glucose (Fingerstick)


 


 338 mg/dL


(70-99) 150 mg/dL


(70-99) 76 mg/dL


(70-99)








Laboratory Tests








Test


 10/8/18


10:35 10/8/18


16:35 10/8/18


20:40


 


Glucose (Fingerstick)


 338 mg/dL


(70-99) 150 mg/dL


(70-99) 76 mg/dL


(70-99)








Medications





Active Scripts








 Medications  Dose


 Route/Sig


 Max Daily Dose Days Date Category


 


 Nortriptyline Hcl


 25 Mg Capsule  1 Cap


 PO QHS


   5/24/18 Reported


 


 Baclofen 20 Mg


 Tablet  1 Tab


 PO BID


   5/24/18 Reported


 


 Prednisone **


  (Prednisone) 10


 Mg Tablet  10 Mg


 PO DAILY


   5/24/18 Reported


 


 Glimepiride 1 Mg


 Tablet  1 Tab


 PO DAILY


   5/24/18 Reported


 


 Lasix


  (Furosemide) 20


 Mg Tablet  1 Tab


 PO DAILY


   5/24/18 Reported


 


 Omeprazole 40 Mg


 Capsule.dr  1 Cap


 PO BID


   5/24/18 Reported


 


 Keppra


  (Levetiracetam)


 500 Mg Tablet  1 Tab


 PO BID


   1/3/18 Reported


 


 Metformin Hcl 500


 Mg Tablet  500 Mg


 PO BIDWMEALS


   12/12/17 Reported


 


 Lidocaine 1 Each


 Adh..patch  1 Each


 TP DAILY


   8/26/17 Reported


 


 Albuterol Sulfate


 Neb Soln


  (Albuterol


 Sulfate) 2.5 Mg/3


 Ml Vial.neb  1 Vial


 NEB PRN Q6HRS PRN


   8/26/17 Reported


 


 Flomax


  (Tamsulosin Hcl)


 0.4 Mg Cap.er.24h  0.4 Mg


 PO DAILY


   1/4/16 Reported


 


 [Polyethylene


 Glycol 3350] 17


 GM Packet  17 Gm


 PO PRN DAILY PRN


   9/18/15 Rx


 


 Klonopin


  (Clonazepam) 0.5


 Mg Tablet  0.5 Mg


 PO BID


   9/18/15 Rx


 


 Children's


 Aspirin (Aspirin)


 81 Mg Tab.chew  81 Mg


 PO DAILY


   9/18/15 Rx


 


 Ventolin Hfa


 Inhaler


  (Albuterol


 Sulfate) 1 Puff


 Puff  2 Puff


 INH PRN Q4HRS PRN


   9/18/15 Rx











Impression


.


IMPRESSION:  Chronic respiratory failure secondary to chronic obstructive


pulmonary disease from tobacco smoke exposure.  He has an acute exacerbation.





Plan


.


STEROIDS


02


NEBS


D/C SMOKING


.











HORTENSIA GARCIA MD Oct 9, 2018 09:07

## 2018-10-10 VITALS
SYSTOLIC BLOOD PRESSURE: 117 MMHG | DIASTOLIC BLOOD PRESSURE: 72 MMHG | DIASTOLIC BLOOD PRESSURE: 72 MMHG | SYSTOLIC BLOOD PRESSURE: 117 MMHG

## 2018-10-10 VITALS — DIASTOLIC BLOOD PRESSURE: 77 MMHG | SYSTOLIC BLOOD PRESSURE: 122 MMHG

## 2018-10-10 VITALS — SYSTOLIC BLOOD PRESSURE: 123 MMHG | DIASTOLIC BLOOD PRESSURE: 81 MMHG

## 2018-10-10 RX ADMIN — PANTOPRAZOLE SODIUM SCH MG: 40 TABLET, DELAYED RELEASE ORAL at 08:10

## 2018-10-10 RX ADMIN — IPRATROPIUM BROMIDE AND ALBUTEROL SULFATE SCH ML: .5; 3 SOLUTION RESPIRATORY (INHALATION) at 11:19

## 2018-10-10 RX ADMIN — BACLOFEN SCH MG: 10 TABLET ORAL at 08:11

## 2018-10-10 RX ADMIN — POLYETHYLENE GLYCOL 3350 SCH GM: 17 POWDER, FOR SOLUTION ORAL at 08:11

## 2018-10-10 RX ADMIN — IPRATROPIUM BROMIDE AND ALBUTEROL SULFATE SCH ML: .5; 3 SOLUTION RESPIRATORY (INHALATION) at 07:23

## 2018-10-10 RX ADMIN — ASPIRIN 81 MG SCH MG: 81 TABLET ORAL at 08:10

## 2018-10-10 RX ADMIN — TAMSULOSIN HYDROCHLORIDE SCH MG: 0.4 CAPSULE ORAL at 08:11

## 2018-10-10 RX ADMIN — GLIMEPIRIDE SCH MG: 2 TABLET ORAL at 08:11

## 2018-10-10 RX ADMIN — FUROSEMIDE SCH MG: 20 TABLET ORAL at 08:10

## 2018-10-10 NOTE — DISCH
DISCHARGE INSTRUCTIONS


Condition on Discharge


Condition on Discharge:  Stable





Activity After Discharge


Activity Instructions for Disc:  Activity as tolerated


Bathing Instructions:  Shower-keep dressing dry


Lifting Instructions after Dis:  No heavy lifting


Exercise Instruction after Dis:  Progress as tolerated


Driving Instructions after Dis:  Do not drive today


Weight Bearing Status after Di:  No restrictions





Diet after Discharge


Diet after Discharge:  Diabetic No Calorie Level


Diet Texture:  Regular


Liquid Texture:  Thin Liquid


Swallowing Supervision:  None needed





Wound Incision Care


Wound/Incision Care:  No wound care needed





Contacting the DR. after DC


Call your doctor for:  Concerns you may have





Follow-Up


Follow up with:  prn





Treatment/Equipment after DC


Adaptive Equipment Issued:  Cane, Front wheeled walker


Discharge Respiratory Equipmen:  Oxygen











MONIKA AHUMADA MD Oct 10, 2018 08:26

## 2018-10-10 NOTE — DS
DATE OF DISCHARGE:  10/10/2018



HOSPITAL SUMMARY:  A 58-year-old white male admitted with left lower quadrant

abdominal pain and a degree of constipation.  Chest x-ray was clear.  Abdominal

pelvic CT showed gas and stool in the right colon to the mid sigmoid and no

signs of any significant inflammatory process.  CBC and chemistry profile were

all within normal limits, as was the urinalysis.  He was treated with oral

laxatives, MiraLax and magnesium citrate, had good results after his initial

refusal to take meds, and his abdominal pain is improved and he is able to be

discharged and followed as an outpatient at this point.



FINAL DIAGNOSES:  Opiate-induced constipation and secondary abdominal pain.



OPERATIONS, PROCEDURES, COMPLICATIONS, AND CONSULTATIONS:  None.



DISPOSITION:  Home meds remain the same.  Encouraged to use MiraLax 2-4 capsules

daily as his chronic intrathecal morphine therapy causes constipation.  Follow

up on a p.r.n. basis.  Prognosis is poor because of COPD, oxygen dependency and

continued tobacco use.

 



______________________________

MONIKA AHUMADA MD



DR:  SANJUANITA/nts  JOB#:  [id number] / [id number]

DD:  10/10/2018 08:29  DT:  10/10/2018 08:40

## 2018-10-10 NOTE — PDOC
PULMONARY PROGRESS NOTES


Subjective


LESS SOA


Vitals





Vital Signs








  Date Time  Temp Pulse Resp B/P (MAP) Pulse Ox O2 Delivery O2 Flow Rate FiO2


 


10/10/18 07:00 98.3 91 16 123/81 (95) 94 Nasal Cannula 4.0 





 98.3       








ROS:  No Nausea, No Chest Pain, No Abdominal Pain, No Increase Cough


General:  Alert


Lungs:  Wheezing, Crackles


Cardiovascular:  S1, S2


Abdomen:  Soft, Non-tender


Neuro Exam:  Alert


Extremities:  No Edema


Skin:  Warm


Labs





Laboratory Tests








Test


 10/8/18


10:35 10/8/18


16:35 10/8/18


20:40 10/9/18


07:48


 


Glucose (Fingerstick)


 338 mg/dL


(70-99) 150 mg/dL


(70-99) 76 mg/dL


(70-99) 100 mg/dL


(70-99)


 


Test


 10/9/18


12:04 10/9/18


17:10 10/9/18


20:11 10/10/18


07:34


 


Glucose (Fingerstick)


 245 mg/dL


(70-99) 367 mg/dL


(70-99) 240 mg/dL


(70-99) 89 mg/dL


(70-99)








Laboratory Tests








Test


 10/9/18


12:04 10/9/18


17:10 10/9/18


20:11 10/10/18


07:34


 


Glucose (Fingerstick)


 245 mg/dL


(70-99) 367 mg/dL


(70-99) 240 mg/dL


(70-99) 89 mg/dL


(70-99)








Medications





Active Scripts








 Medications  Dose


 Route/Sig


 Max Daily Dose Days Date Category


 


 Nortriptyline Hcl


 25 Mg Capsule  1 Cap


 PO QHS


   5/24/18 Reported


 


 Baclofen 20 Mg


 Tablet  1 Tab


 PO BID


   5/24/18 Reported


 


 Prednisone **


  (Prednisone) 10


 Mg Tablet  10 Mg


 PO DAILY


   5/24/18 Reported


 


 Glimepiride 1 Mg


 Tablet  1 Tab


 PO DAILY


   5/24/18 Reported


 


 Lasix


  (Furosemide) 20


 Mg Tablet  1 Tab


 PO DAILY


   5/24/18 Reported


 


 Omeprazole 40 Mg


 Capsule.dr  1 Cap


 PO BID


   5/24/18 Reported


 


 Keppra


  (Levetiracetam)


 500 Mg Tablet  1 Tab


 PO BID


   1/3/18 Reported


 


 Metformin Hcl 500


 Mg Tablet  500 Mg


 PO BIDWMEALS


   12/12/17 Reported


 


 Lidocaine 1 Each


 Adh..patch  1 Each


 TP DAILY


   8/26/17 Reported


 


 Albuterol Sulfate


 Neb Soln


  (Albuterol


 Sulfate) 2.5 Mg/3


 Ml Vial.neb  1 Vial


 NEB PRN Q6HRS PRN


   8/26/17 Reported


 


 Flomax


  (Tamsulosin Hcl)


 0.4 Mg Cap.er.24h  0.4 Mg


 PO DAILY


   1/4/16 Reported


 


 [Polyethylene


 Glycol 3350] 17


 GM Packet  17 Gm


 PO PRN DAILY PRN


   9/18/15 Rx


 


 Klonopin


  (Clonazepam) 0.5


 Mg Tablet  0.5 Mg


 PO BID


   9/18/15 Rx


 


 Children's


 Aspirin (Aspirin)


 81 Mg Tab.chew  81 Mg


 PO DAILY


   9/18/15 Rx


 


 Ventolin Hfa


 Inhaler


  (Albuterol


 Sulfate) 1 Puff


 Puff  2 Puff


 INH PRN Q4HRS PRN


   9/18/15 Rx











Impression


.


IMPRESSION:  Chronic respiratory failure secondary to chronic obstructive


pulmonary disease from tobacco smoke exposure.  He has an acute exacerbation.





Plan


.


D/C HOME


NO SMOKING


FOLLOW UP IN OFFICE





02


[facility]





.











HORTENSIA GARCIA MD Oct 10, 2018 09:00

## 2018-10-18 ENCOUNTER — HOSPITAL ENCOUNTER (OUTPATIENT)
Dept: HOSPITAL 61 - PNCL | Age: 58
Discharge: HOME | End: 2018-10-18
Attending: ANESTHESIOLOGY
Payer: COMMERCIAL

## 2018-10-18 DIAGNOSIS — G89.4: Primary | ICD-10-CM

## 2018-10-18 DIAGNOSIS — R25.2: ICD-10-CM

## 2018-10-18 DIAGNOSIS — G44.89: ICD-10-CM

## 2018-10-18 DIAGNOSIS — G97.1: ICD-10-CM

## 2018-10-18 PROCEDURE — G0463 HOSPITAL OUTPT CLINIC VISIT: HCPCS

## 2018-10-18 NOTE — PAIN
DATE OF SERVICE:  10/18/2018



PROGRESS NOTE FOR PAIN CLINIC 



DIAGNOSES:

1.  Chronic pain syndrome.

2.  Spasticity.

3.  Headaches with intrathecal pump therapy with morphine.



HISTORY OF PRESENT ILLNESS:  The patient is a 58-year-old male who returns for

followup status post intrathecal pump therapy.  The patient has been doing well

until the last week or so.  The pain being returning significantly in the low

back in the left side primarily and into the left flank to some degree with an

increased spasticity and increased pain.  The patient reports it has been much

worse.  He has been using a heating pad, been stretching and doing some massage

therapies on his back with help from his wife.  The patient reports it is still

significant to the point where he was ready to go to the Emergency Room but

wanted to check on having his pump adjusted first.  The patient reports no side

effects with the medications recently.  Still using oxygen at home with some

respiratory disease.  The patient reports his pain, however, is a 10 on scale of

10 at its worst, average and at least and is a 10 today.  The patient reports it

is radiating, constant, severe cramping, stabbing, tingling, aching, sharp, dull

and shooting in the mid back, upper back left side, left low back as well as in

the left arm.  The patient reports it has been this way for about a week or so

without any specific injury that he is aware of.  The patient reports it awakens

him from sleep off and on but generally feels better with lying down, worse with

up and around so he is standing and walking using a cane in his left hand, which

seems to cause some shoulder pain as well by his report.



PHYSICAL EXAMINATION:

VITAL SIGNS:  The patient's blood pressure is 122/46, pulse 119, respirations 18

and temperature 97.9 degrees Fahrenheit.  Height is 5 feet 7 inches and weighs

136 pounds.

GENERAL:  The patient is awake, alert, oriented, appropriate and very pleasant

demeanor.

HEENT:  Head shows normocephalic and atraumatic.  Extraocular movements are

intact and symmetrical.  Oral cavity:  Mucous membranes moist and pink. 

Dentition is intact.

NECK:  Shows anterior throat supple without palpable lymphadenopathy noted. 

Swallow reflex symmetrical.

CHEST:  Shows normal on inspection.  Breath sounds are distant but clear

bilaterally.

HEART:  Shows S1 and S2 clear.  No murmurs auscultated.

ABDOMEN:  Soft, obese, nontender and nondistended.  Small umbilical hernia is

noted.  Intrathecal pump is in the left lower quadrant, which is easily palpable

and nontender as well.

BACK:  The patient's back shows spine grossly in the midline.  Significant

tenderness throughout the lumbar paraspinous musculature bilaterally but only

diffusely without specific trigger points, without radiation and without atrophy

or hypertrophy.  The patient shows good rotational motion, however, the lumbar

spine as well as extension and flexion without significant increase in pain.



Options were discussed with the patient.  The patient's old chart was reviewed

as well as his current medication regimen updated.  Current review of systems

updated today as well.  We will change slightly increase in the patient's

intrathecal pump to 6.510 mg per day of morphine.  He was originally started 8

mg; however, he was having respiratory depression issues in the past and we have

decreased it.  We will very slowly increase this and titrate to his pain level. 

The patient understands and agrees.  The patient will follow up in approximately

2-3 weeks as scheduled and we will reevaluate the pain at that time.  The

patient was cautioned as to side effects as well as respiratory depression and

also cautioned his wife, she is very aware of these as well as he has had

several days in the past requiring emergent medical treatment.  We will follow

up as scheduled in about 2-3 weeks and we will assess his pain situation at that

time.

 



______________________________

AGUSTÍN DIEZ MD



DR:  FÉLIX/duane  JOB#:  [id number] / [id number]

DD:  10/18/2018 11:17  DT:  10/18/2018 23:20

## 2021-08-31 NOTE — PDOC
PULMONARY PROGRESS NOTES


Subjective


feels better, fully awake


off BIPAP


Vitals





Vital Signs








  Date Time  Temp Pulse Resp B/P (MAP) Pulse Ox O2 Delivery O2 Flow Rate FiO2


 


12/14/17 07:59     93 Nasal Cannula 2.0 


 


12/14/17 07:30 97.9 109 20 148/84 (105)    





 97.9       








ROS:  No Nausea, No Chest Pain, No Abdominal Pain, No Increase Cough


General:  Alert, No acute distress


HEENT:  Other


Lungs:  Clear


Cardiovascular:  S1, S2


Abdomen:  Soft, Non-tender


Neuro Exam:  Alert


Extremities:  Other (1+edema)


Labs





Laboratory Tests








Test


  12/12/17


10:59 12/12/17


16:46 12/12/17


20:45 12/13/17


07:59


 


Glucose (Fingerstick)


  226 mg/dL


(70-99) 158 mg/dL


(70-99) 161 mg/dL


(70-99) 139 mg/dL


(70-99)


 


Test


  12/13/17


11:16 12/13/17


17:24 12/13/17


20:58 12/14/17


08:13


 


Glucose (Fingerstick)


  153 mg/dL


(70-99) 163 mg/dL


(70-99) 122 mg/dL


(70-99) 107 mg/dL


(70-99)








Laboratory Tests








Test


  12/13/17


11:16 12/13/17


17:24 12/13/17


20:58 12/14/17


08:13


 


Glucose (Fingerstick)


  153 mg/dL


(70-99) 163 mg/dL


(70-99) 122 mg/dL


(70-99) 107 mg/dL


(70-99)








Medications





Active Scripts








 Medications  Dose


 Route/Sig


 Max Daily Dose Days Date Category


 


 Lidocaine 1 Each


 Adh..patch  1 Each


 TP DAILY


    8/26/17 Reported


 


 Omeprazole 40 Mg


 Capsule.dr  1 Cap


 PO DAILY


    8/26/17 Reported


 


 Amitriptyline Hcl


 25 Mg Tablet  1 Tab


 PO QHS


    8/26/17 Reported


 


 Albuterol Sulfate


 Neb Soln


  (Albuterol


 Sulfate) 2.5 Mg/3


 Ml Vial.neb  1 Vial


 NEB PRN Q6HRS PRN


    8/26/17 Reported


 


 Levetiracetam 500


 Mg Tablet  500 Mg


 PO BID


    8/16/16 Reported


 


 Flomax


  (Tamsulosin Hcl)


 0.4 Mg Cap.er.24h  0.4 Mg


 PO DAILY


    1/4/16 Reported


 


 Lasix


  (Furosemide) 20


 Mg Tablet  20 Mg


 PO BID


    9/28/15 Reported


 


 [Polyethylene


 Glycol 3350] 17


 GM Packet  17 Gm


 PO PRN DAILY PRN


    9/18/15 Rx


 


 Advair 250-50


 Diskus


  (Fluticasone/Salmeterol)


 1 Puff Puff  1 Puff


 IH BID


    9/18/15 Rx


 


 Klonopin


  (Clonazepam) 0.5


 Mg Tablet  0.5 Mg


 PO BID


    9/18/15 Rx


 


 [Baclofen] 10 MG


 Tablet  20 Mg


 PO BID


    9/18/15 Rx


 


 Children's


 Aspirin (Aspirin)


 81 Mg Tab.chew  81 Mg


 PO DAILY


    9/18/15 Rx


 


 Ventolin Hfa


 Inhaler


  (Albuterol


 Sulfate) 1 Puff


 Puff  2 Puff


 INH PRN Q4HRS PRN


    9/18/15 Rx











Impression


.


1.  Acute on chronic hypercapnic respiratory failure secondary to acute toxic


encephalopathy and chronic obstructive pulmonary disease exacerbation.


2.  Possible left lower lobe infiltrate versus atelectasis.


3.  History of stable basal lung nodules, to be followed up at the office with


Dr. Voss.


4.  Urinary tract infection, present on admission.


5.  Increased troponin, present on admission.





Plan


.





1.  Continue with present nasal canula and qhs BiPAP,


2.  Continue present bronchodilators.


3.  Steroids taper


4.  Follow urine cultures.


5.  Follow chest x-ray as needed.


6.  Follow outpatient CT chest regarding lung nodule.


7.  Continue bronchodilators.


8.  Continue antibiotic per Infectious Disease.


     ok with dc to [facility] Disla MD Dec 14, 2017 09:46 Alert and oriented to person, place, time/situation. normal mood and affect. no apparent risk to self or others.